# Patient Record
Sex: FEMALE | Race: WHITE | NOT HISPANIC OR LATINO | Employment: FULL TIME | URBAN - METROPOLITAN AREA
[De-identification: names, ages, dates, MRNs, and addresses within clinical notes are randomized per-mention and may not be internally consistent; named-entity substitution may affect disease eponyms.]

---

## 2024-08-23 RX ORDER — PREDNISONE 10 MG/1
10 TABLET ORAL 2 TIMES DAILY
COMMUNITY

## 2024-08-23 RX ORDER — ALBUTEROL SULFATE 90 UG/1
1 AEROSOL, METERED RESPIRATORY (INHALATION) EVERY 4 HOURS PRN
COMMUNITY

## 2024-08-23 RX ORDER — LORAZEPAM 1 MG/1
1 TABLET ORAL EVERY 8 HOURS PRN
COMMUNITY

## 2024-08-23 RX ORDER — GABAPENTIN 100 MG/1
100 CAPSULE ORAL
COMMUNITY

## 2024-08-23 RX ORDER — ESCITALOPRAM OXALATE 10 MG/1
10 TABLET ORAL DAILY
COMMUNITY

## 2024-08-23 RX ORDER — BENZONATATE 200 MG/1
100 CAPSULE ORAL 3 TIMES DAILY PRN
COMMUNITY

## 2024-08-26 ENCOUNTER — LAB (OUTPATIENT)
Dept: LAB | Facility: HOSPITAL | Age: 64
End: 2024-08-26
Payer: COMMERCIAL

## 2024-08-26 ENCOUNTER — HOSPITAL ENCOUNTER (OUTPATIENT)
Dept: CARDIOLOGY | Facility: HOSPITAL | Age: 64
Discharge: HOME | End: 2024-08-26
Payer: COMMERCIAL

## 2024-08-26 LAB
APTT PPP: 27.1 SECONDS (ref 24–31)
INR PPP: 0.93 (ref 0.93–1.1)
PROTHROMBIN TIME: 10.2 SECONDS (ref 9.6–11.7)

## 2024-08-26 PROCEDURE — 85730 THROMBOPLASTIN TIME PARTIAL: CPT | Performed by: INTERNAL MEDICINE

## 2024-08-26 PROCEDURE — 85610 PROTHROMBIN TIME: CPT | Performed by: INTERNAL MEDICINE

## 2024-08-26 PROCEDURE — 93010 ELECTROCARDIOGRAM REPORT: CPT | Performed by: INTERNAL MEDICINE

## 2024-08-26 PROCEDURE — 93005 ELECTROCARDIOGRAM TRACING: CPT

## 2024-08-26 PROCEDURE — 36415 COLL VENOUS BLD VENIPUNCTURE: CPT | Performed by: INTERNAL MEDICINE

## 2024-08-30 ENCOUNTER — ANESTHESIA (OUTPATIENT)
Dept: GASTROENTEROLOGY | Facility: HOSPITAL | Age: 64
End: 2024-08-30
Payer: COMMERCIAL

## 2024-08-30 ENCOUNTER — ANESTHESIA EVENT (OUTPATIENT)
Dept: GASTROENTEROLOGY | Facility: HOSPITAL | Age: 64
End: 2024-08-30
Payer: COMMERCIAL

## 2024-08-30 ENCOUNTER — HOSPITAL ENCOUNTER (OUTPATIENT)
Facility: HOSPITAL | Age: 64
Setting detail: HOSPITAL OUTPATIENT SURGERY
Discharge: HOME | End: 2024-08-30
Attending: INTERNAL MEDICINE | Admitting: INTERNAL MEDICINE
Payer: COMMERCIAL

## 2024-08-30 VITALS
RESPIRATION RATE: 12 BRPM | BODY MASS INDEX: 25.16 KG/M2 | HEIGHT: 68 IN | TEMPERATURE: 98.2 F | DIASTOLIC BLOOD PRESSURE: 41 MMHG | WEIGHT: 166 LBS | HEART RATE: 74 BPM | OXYGEN SATURATION: 95 % | SYSTOLIC BLOOD PRESSURE: 113 MMHG

## 2024-08-30 DIAGNOSIS — R91.8 HILAR MASS: ICD-10-CM

## 2024-08-30 DIAGNOSIS — C34.91 PRIMARY LUNG CANCER, RIGHT: Primary | ICD-10-CM

## 2024-08-30 LAB
B PARAPERT DNA SPEC QL NAA+PROBE: NOT DETECTED
B PERT DNA SPEC QL NAA+PROBE: NOT DETECTED
C PNEUM DNA NPH QL NAA+NON-PROBE: NOT DETECTED
DEPRECATED RDW RBC AUTO: 51.3 FL (ref 37–54)
ERYTHROCYTE [DISTWIDTH] IN BLOOD BY AUTOMATED COUNT: 17 % (ref 12.3–15.4)
FLUAV SUBTYP SPEC NAA+PROBE: NOT DETECTED
FLUBV RNA ISLT QL NAA+PROBE: NOT DETECTED
HADV DNA SPEC NAA+PROBE: NOT DETECTED
HCOV 229E RNA SPEC QL NAA+PROBE: NOT DETECTED
HCOV HKU1 RNA SPEC QL NAA+PROBE: NOT DETECTED
HCOV NL63 RNA SPEC QL NAA+PROBE: NOT DETECTED
HCOV OC43 RNA SPEC QL NAA+PROBE: NOT DETECTED
HCT VFR BLD AUTO: 40.7 % (ref 34–46.6)
HGB BLD-MCNC: 13.1 G/DL (ref 12–15.9)
HMPV RNA NPH QL NAA+NON-PROBE: NOT DETECTED
HPIV1 RNA ISLT QL NAA+PROBE: NOT DETECTED
HPIV2 RNA SPEC QL NAA+PROBE: NOT DETECTED
HPIV3 RNA NPH QL NAA+PROBE: NOT DETECTED
HPIV4 P GENE NPH QL NAA+PROBE: NOT DETECTED
M PNEUMO IGG SER IA-ACNC: NOT DETECTED
MCH RBC QN AUTO: 26.6 PG (ref 26.6–33)
MCHC RBC AUTO-ENTMCNC: 32.2 G/DL (ref 31.5–35.7)
MCV RBC AUTO: 82.6 FL (ref 79–97)
PLATELET # BLD AUTO: 322 10*3/MM3 (ref 140–450)
PMV BLD AUTO: 8.4 FL (ref 6–12)
QT INTERVAL: 404 MS
QTC INTERVAL: 430 MS
RBC # BLD AUTO: 4.93 10*6/MM3 (ref 3.77–5.28)
RHINOVIRUS RNA SPEC NAA+PROBE: NOT DETECTED
RSV RNA NPH QL NAA+NON-PROBE: NOT DETECTED
SARS-COV-2 RNA NPH QL NAA+NON-PROBE: NOT DETECTED
WBC NRBC COR # BLD AUTO: 11.53 10*3/MM3 (ref 3.4–10.8)

## 2024-08-30 PROCEDURE — 87206 SMEAR FLUORESCENT/ACID STAI: CPT | Performed by: INTERNAL MEDICINE

## 2024-08-30 PROCEDURE — 0202U NFCT DS 22 TRGT SARS-COV-2: CPT | Performed by: INTERNAL MEDICINE

## 2024-08-30 PROCEDURE — 88342 IMHCHEM/IMCYTCHM 1ST ANTB: CPT | Performed by: INTERNAL MEDICINE

## 2024-08-30 PROCEDURE — 88177 CYTP FNA EVAL EA ADDL: CPT | Performed by: INTERNAL MEDICINE

## 2024-08-30 PROCEDURE — 87205 SMEAR GRAM STAIN: CPT | Performed by: INTERNAL MEDICINE

## 2024-08-30 PROCEDURE — 25010000002 SUCCINYLCHOLINE PER 20 MG: Performed by: NURSE ANESTHETIST, CERTIFIED REGISTERED

## 2024-08-30 PROCEDURE — 25810000003 LACTATED RINGERS PER 1000 ML: Performed by: NURSE ANESTHETIST, CERTIFIED REGISTERED

## 2024-08-30 PROCEDURE — 88341 IMHCHEM/IMCYTCHM EA ADD ANTB: CPT | Performed by: INTERNAL MEDICINE

## 2024-08-30 PROCEDURE — 87116 MYCOBACTERIA CULTURE: CPT | Performed by: INTERNAL MEDICINE

## 2024-08-30 PROCEDURE — 88172 CYTP DX EVAL FNA 1ST EA SITE: CPT | Performed by: INTERNAL MEDICINE

## 2024-08-30 PROCEDURE — C1726 CATH, BAL DIL, NON-VASCULAR: HCPCS | Performed by: INTERNAL MEDICINE

## 2024-08-30 PROCEDURE — 87070 CULTURE OTHR SPECIMN AEROBIC: CPT | Performed by: INTERNAL MEDICINE

## 2024-08-30 PROCEDURE — 88173 CYTOPATH EVAL FNA REPORT: CPT | Performed by: INTERNAL MEDICINE

## 2024-08-30 PROCEDURE — 25810000003 SODIUM CHLORIDE 0.9 % SOLUTION: Performed by: INTERNAL MEDICINE

## 2024-08-30 PROCEDURE — 87798 DETECT AGENT NOS DNA AMP: CPT | Performed by: INTERNAL MEDICINE

## 2024-08-30 PROCEDURE — 85027 COMPLETE CBC AUTOMATED: CPT | Performed by: INTERNAL MEDICINE

## 2024-08-30 PROCEDURE — 87102 FUNGUS ISOLATION CULTURE: CPT | Performed by: INTERNAL MEDICINE

## 2024-08-30 PROCEDURE — 88305 TISSUE EXAM BY PATHOLOGIST: CPT | Performed by: INTERNAL MEDICINE

## 2024-08-30 PROCEDURE — 88108 CYTOPATH CONCENTRATE TECH: CPT | Performed by: INTERNAL MEDICINE

## 2024-08-30 PROCEDURE — 25010000002 SUGAMMADEX 200 MG/2ML SOLUTION: Performed by: NURSE ANESTHETIST, CERTIFIED REGISTERED

## 2024-08-30 RX ORDER — SODIUM CHLORIDE, SODIUM LACTATE, POTASSIUM CHLORIDE, CALCIUM CHLORIDE 600; 310; 30; 20 MG/100ML; MG/100ML; MG/100ML; MG/100ML
INJECTION, SOLUTION INTRAVENOUS CONTINUOUS PRN
Status: DISCONTINUED | OUTPATIENT
Start: 2024-08-30 | End: 2024-08-30 | Stop reason: SURG

## 2024-08-30 RX ORDER — PHENYLEPHRINE HCL IN 0.9% NACL 1 MG/10 ML
SYRINGE (ML) INTRAVENOUS AS NEEDED
Status: DISCONTINUED | OUTPATIENT
Start: 2024-08-30 | End: 2024-08-30 | Stop reason: SURG

## 2024-08-30 RX ORDER — LIDOCAINE HYDROCHLORIDE 20 MG/ML
INJECTION, SOLUTION INFILTRATION; PERINEURAL AS NEEDED
Status: DISCONTINUED | OUTPATIENT
Start: 2024-08-30 | End: 2024-08-30 | Stop reason: SURG

## 2024-08-30 RX ORDER — ROCURONIUM BROMIDE 10 MG/ML
INJECTION, SOLUTION INTRAVENOUS AS NEEDED
Status: DISCONTINUED | OUTPATIENT
Start: 2024-08-30 | End: 2024-08-30 | Stop reason: SURG

## 2024-08-30 RX ORDER — SUCCINYLCHOLINE CHLORIDE 20 MG/ML
INJECTION INTRAMUSCULAR; INTRAVENOUS AS NEEDED
Status: DISCONTINUED | OUTPATIENT
Start: 2024-08-30 | End: 2024-08-30 | Stop reason: SURG

## 2024-08-30 RX ORDER — SODIUM CHLORIDE 9 MG/ML
50 INJECTION, SOLUTION INTRAVENOUS CONTINUOUS
Status: DISCONTINUED | OUTPATIENT
Start: 2024-08-30 | End: 2024-08-30 | Stop reason: HOSPADM

## 2024-08-30 RX ORDER — METOPROLOL TARTRATE 1 MG/ML
INJECTION, SOLUTION INTRAVENOUS AS NEEDED
Status: DISCONTINUED | OUTPATIENT
Start: 2024-08-30 | End: 2024-08-30 | Stop reason: SURG

## 2024-08-30 RX ORDER — LIDOCAINE 50 MG/G
OINTMENT TOPICAL AS NEEDED
Status: DISCONTINUED | OUTPATIENT
Start: 2024-08-30 | End: 2024-08-30 | Stop reason: HOSPADM

## 2024-08-30 RX ADMIN — Medication 100 MCG: at 11:29

## 2024-08-30 RX ADMIN — ROCURONIUM BROMIDE 45 MG: 10 INJECTION, SOLUTION INTRAVENOUS at 11:16

## 2024-08-30 RX ADMIN — SUGAMMADEX 301 MG: 100 INJECTION, SOLUTION INTRAVENOUS at 11:43

## 2024-08-30 RX ADMIN — ROCURONIUM BROMIDE 5 MG: 10 INJECTION, SOLUTION INTRAVENOUS at 11:08

## 2024-08-30 RX ADMIN — LIDOCAINE HYDROCHLORIDE 100 MG: 20 INJECTION, SOLUTION INFILTRATION; PERINEURAL at 11:08

## 2024-08-30 RX ADMIN — SODIUM CHLORIDE, SODIUM LACTATE, POTASSIUM CHLORIDE, AND CALCIUM CHLORIDE: .6; .31; .03; .02 INJECTION, SOLUTION INTRAVENOUS at 11:04

## 2024-08-30 RX ADMIN — SUCCINYLCHOLINE CHLORIDE 160 MG: 20 INJECTION, SOLUTION INTRAMUSCULAR; INTRAVENOUS at 11:08

## 2024-08-30 RX ADMIN — METOPROLOL TARTRATE 2 MG: 1 INJECTION, SOLUTION INTRAVENOUS at 11:32

## 2024-08-30 RX ADMIN — SODIUM CHLORIDE 50 ML/HR: 9 INJECTION, SOLUTION INTRAVENOUS at 10:01

## 2024-08-30 NOTE — ANESTHESIA PROCEDURE NOTES
Airway  Urgency: elective    Date/Time: 8/30/2024 11:09 AM  Airway not difficult    General Information and Staff    Patient location during procedure: OR  Anesthesiologist: Denver Salomon MD  CRNA/CAA: Tanya Carias CRNA    Indications and Patient Condition  Indications for airway management: airway protection    Preoxygenated: yes  Mask difficulty assessment: 0 - not attempted    Final Airway Details  Final airway type: endotracheal airway      Successful airway: ETT     Successful intubation technique: video laryngoscopy and RSI  Facilitating devices/methods: intubating stylet  Endotracheal tube insertion site: oral  Blade: [place]  Blade size: 3  ETT size (mm): 8.5  Cormack-Lehane Classification: grade I - full view of glottis  Placement verified by: chest auscultation, bronchoscopy, capnometry and palpation of cuff   Measured from: lips  Number of attempts at approach: 1  Assessment: lips, teeth, and gum same as pre-op and atraumatic intubation

## 2024-08-30 NOTE — H&P
Patient Care Team:  Laura Archer as PCP - General (Nurse Practitioner)    Chief complaint right hilar mass        Assessment & Plan        64-year-old female with 50 pack year history of smoking CT chest at [facility] 2024 very large right hilar mass with almost complete obstruction right upper lobe, no hemoptysis no weight loss, family history positive lung cancer      Plan  EBUS bronchoscopy      History         64-year-old female with 50 pack year history of smoking CT chest at [facility] 2024 very large right hilar mass with almost complete obstruction right upper lobe, no hemoptysis no weight loss, family history positive lung cancer      * No active hospital problems. *      Past Medical History:   Diagnosis Date    Arthritis     Cancer     skin cancer on leg right    GERD (gastroesophageal reflux disease)        Past Surgical History:   Procedure Laterality Date     SECTION      x3    KNEE SURGERY Left     ORIF, screws and plates, has had Operation on it x 3       History reviewed. No pertinent family history.    Social History     Socioeconomic History    Marital status: Single   Tobacco Use    Smoking status: Every Day     Current packs/day: 0.50     Average packs/day: 0.5 packs/day for 47.7 years (23.8 ttl pk-yrs)     Types: Cigarettes     Start date:     Smokeless tobacco: Never   Vaping Use    Vaping status: Never Used   Substance and Sexual Activity    Alcohol use: Not Currently    Drug use: Never    Sexual activity: Defer       Review of Systems  Review of Systems   Respiratory:  Positive for cough and shortness of breath. Negative for wheezing and stridor.    Cardiovascular:  Negative for chest pain, palpitations and leg swelling.        Vital Signs       Physical Exam:  Physical Exam  Cardiovascular:      Heart sounds: No murmur heard.     No gallop.   Pulmonary:      Effort: No respiratory distress.      Breath sounds: No stridor. Rhonchi  "present. No wheezing or rales.   Chest:      Chest wall: No tenderness.           Radiology  Imaging Results (Last 24 Hours)       ** No results found for the last 24 hours. **            Labs:            Invalid input(s): \"LABALBU\", \"PROT\"                      Results from last 7 days   Lab Units 08/26/24  0945   INR  0.93   APTT seconds 27.1               Meds:   SCHEDULE    Infusions  No current facility-administered medications for this encounter.    PRNs        I discussed the patient's findings and my recommendations with patient.     Crystal Powell MD  08/30/24  09:31 EDT    Time: Critical care 60 min  "

## 2024-08-30 NOTE — DISCHARGE INSTRUCTIONS
Endoscopic Bronchial Ultrasound (EBUS)  Endoscopic Bronchial Ultrasound (EBUS) allows for sampling of lymph nodes in your chest during a bronchoscopy procedure.  Samples (biopsies) of the lymph nodes are taken from inside the lungs and sent for diagnostic testing.  Final results of your biopsy take up to 5 business days to process; your endoscopic physician will contact you with your results.  EBUS is recommended in the following instances:  To diagnose different types of lung disorders (such as sarcoidosis or tuberculosis)  To diagnose or 'stage' cancer   To investigate enlarged lymph  nodes in the chest  Due to effects of sedation, do not drive or operate heavy machinery for 24 hours.  Avoid heavy lifting (>10 lbs.) or strenuous activity for 48 hours.  Continue to avoid non-steroidal anti-inflammatory medications (NSAIDS) for 5-7 days after the procedure unless told otherwise by your endoscopic and primary care physicians.  Some patients have a temporary sore throat after the procedure; this is a normal finding and over-the-counter lozenges help soothe symptoms.  You may resume normal diet once you are discharged.   Avoid red-colored foods for 24 hours.   You may resume your blood thinner medications (if applicable) as directed by your endoscopic and primary care physicians.  It is normal to have a very small amount of blood-tinged sputum immediately after the procedure. This should resolve within a few hours.  Although complications are rare, there is a small risk of pain, collapsed lung, bleeding and infection. Contact your endoscopic physician if you have these signs or symptoms (Dr. Powell @ [phone]/ Dr. Wright @ [phone]):  Temperature greater than 102°F  Worsening pain not relieved by medications  Go to your nearest emergency room is you experience:  Shaking, chills or a temperature over 102°F.    New, sudden difficulty breathing.    New pain when taking a deep breath.    New, sudden chest  pain.  Worsening cough that produces large amounts of blood.

## 2024-08-30 NOTE — OP NOTE
Bronchoscopy Procedure Note    Timeout was done appropriately by staff    Procedure:  EBUS bronchoscopy directed FNA right hilar mass x 9 utilizing size 22-gauge needle  Right upper lobe washing    Preoperative Diagnosis: Right hilar mass    Postoperative Diagnosis:     Pathologist onsite confirmed malignant cells on FNA favoring non-small cell further confirmation pending staining    Anesthesia: General Anesthesia    Procedure Details: Patient was consented for the procedure with all risks and benefits of the procedure explained in detail.  Patient was given the opportunity to ask questions and all concerns were answered.  The bronchocope was inserted into the main airway via the endotracheal tube. An anatomical survey was done of the main airways and the subsegmental bronchus to at least the first subsegmental level of all five lobes of both lungs.  The findings are reported below.  A bronchoalveolar lavage was performed using aliquots of normal saline instilled into the airways then aspirated back.      Findings:      Started with routine bronchoscopy which was introduced through the endotracheal tube all airways were visualized to at least the first subsegment level of all 5 lobes of both lungs.  Airways were of normal size and caliber.  No endobronchial lesions seen.    Mucosa appeared normal without evidence of infiltration    We switched to EBUS bronchoscopy  We located the right hilar node 10R under direct ultrasound guidance. Three passes were done utilizing size 22 gauge needle  Additional FNA samples obtained from right hilar mass total of 6 passes  No bleeding was noted    Patient tolerated procedure well        Estimated Blood Loss:  Minimal           Specimens:  Sent serosanguinous fluid                Complications:  None; patient tolerated the procedure well.           Disposition: PACU - hemodynamically stable.      Patient tolerated the procedure well.    Crystal Powell MD  8/30/2024  12:15 EDT

## 2024-08-30 NOTE — ANESTHESIA POSTPROCEDURE EVALUATION
Patient: Laura Higginbotham    Procedure Summary       Date: 08/30/24 Room / Location: [facility] ENDOSCOPY 3 / [facility] ENDOSCOPY    Anesthesia Start: 1104 Anesthesia Stop: 1155    Procedure: BRONCHOSCOPY WITH BRONCHIAL WASHING ,ENDOBRONCHIAL ULTRASOUND WITH FINE NEEDLE ASPIRATION X2 AREAS (Bronchus) Diagnosis:       Hilar mass      (Hilar mass [R91.8])    Surgeons: Crystal Powell MD Provider: Denver Salomon MD    Anesthesia Type: general ASA Status: 3            Anesthesia Type: general    Vitals  Vitals Value Taken Time   /47 08/30/24 1207   Temp     Pulse 73 08/30/24 1208   Resp     SpO2 96 % 08/30/24 1208   Vitals shown include unfiled device data.        Post Anesthesia Care and Evaluation    Patient location during evaluation: PACU  Patient participation: complete - patient participated  Level of consciousness: awake  Pain scale: See nurse's notes for pain score.  Pain management: adequate    Airway patency: patent  Anesthetic complications: No anesthetic complications  PONV Status: none  Cardiovascular status: acceptable  Respiratory status: acceptable and spontaneous ventilation  Hydration status: acceptable    Comments: Patient seen and examined postoperatively; vital signs stable; SpO2 greater than or equal to 90%; cardiopulmonary status stable; nausea/vomiting adequately controlled; pain adequately controlled; no apparent anesthesia complications; patient discharged from anesthesia care when discharge criteria were met

## 2024-08-30 NOTE — ANESTHESIA PREPROCEDURE EVALUATION
Anesthesia Evaluation     NPO Solid Status: > 8 hours  NPO Liquid Status: > 8 hours           Airway   Mallampati: II  TM distance: >3 FB  Neck ROM: full  No difficulty expected  Dental - normal exam     Pulmonary - normal exam   Cardiovascular - normal exam        Neuro/Psych  GI/Hepatic/Renal/Endo    (+) GERD well controlled    Musculoskeletal     Abdominal  - normal exam    Bowel sounds: normal.   Substance History      OB/GYN          Other   arthritis,   history of cancer active                Anesthesia Plan    ASA 3     general     intravenous induction     Anesthetic plan, risks, benefits, and alternatives have been provided, discussed and informed consent has been obtained with: patient.  Pre-procedure education provided  Plan discussed with CRNA.    CODE STATUS:

## 2024-08-31 LAB — NIGHT BLUE STAIN TISS: NORMAL

## 2024-09-01 LAB
BACTERIA SPEC RESP CULT: NORMAL
GRAM STN SPEC: NORMAL
GRAM STN SPEC: NORMAL

## 2024-09-04 LAB
DX PRELIMINARY: NORMAL
LAB AP CASE REPORT: NORMAL
P JIROVECII DNA L RESP QL NAA+NON-PROBE: NEGATIVE
PATH REPORT.FINAL DX SPEC: NORMAL
PATH REPORT.GROSS SPEC: NORMAL
REF LAB TEST METHOD: NORMAL

## 2024-09-05 LAB
BEAKER LAB AP INTRAOPERATIVE CONSULTATION: NORMAL
LAB AP CASE REPORT: NORMAL
LAB AP DIAGNOSIS COMMENT: NORMAL
PATH REPORT.FINAL DX SPEC: NORMAL
PATH REPORT.GROSS SPEC: NORMAL

## 2024-09-06 ENCOUNTER — TELEPHONE (OUTPATIENT)
Dept: ONCOLOGY | Facility: CLINIC | Age: 64
End: 2024-09-06
Payer: COMMERCIAL

## 2024-09-06 LAB
FUNGUS WND CULT: NORMAL
MYCOBACTERIUM SPEC CULT: NORMAL
NIGHT BLUE STAIN TISS: NORMAL

## 2024-09-10 LAB
BEAKER LAB AP INTRAOPERATIVE CONSULTATION: NORMAL
LAB AP CASE REPORT: NORMAL
LAB AP DIAGNOSIS COMMENT: NORMAL
PATH REPORT.ADDENDUM SPEC: NORMAL
PATH REPORT.FINAL DX SPEC: NORMAL
PATH REPORT.GROSS SPEC: NORMAL

## 2024-09-11 ENCOUNTER — PATIENT OUTREACH (OUTPATIENT)
Dept: ONCOLOGY | Facility: CLINIC | Age: 64
End: 2024-09-11
Payer: COMMERCIAL

## 2024-09-11 PROBLEM — C34.11 MALIGNANT NEOPLASM OF UPPER LOBE OF RIGHT LUNG: Status: ACTIVE | Noted: 2024-09-11

## 2024-09-11 NOTE — PROGRESS NOTES
HEMATOLOGY ONCOLOGY OUTPATIENT CONSULTATION       Patient name: Laura Higginbotham  : 1960  MRN: [id number]  Primary Care Physician: Laura Archer  Referring Physician: Crystal Powell MD  Reason For Consult: Primary lung cancer, right      History of Present Illness:  Patient is a 64 y.o. PMH of smoking, skin cancer, arthritis, GERD who presents for newly diagnosed lung cancer.    -2024 Pt went to Dr Archer for increased nonproductive coughing and feeling more dysnpeic on exertion. Was dx with pleurisy and given prednisone which helped somewhat.  -2024 Since cough hadn't completely gone away in 6 weeks, patient went back to Dr. Archer who became concerned and ordered a CXR and antibiotics.  -CXR showed a right hilar mass, CT was ordered.    -2024 CT chest at [facility]: Revealed a very large right hilar mass measuring 9.2 x 9.5 cm in diameter with hypodensity centrally suggesting a necrotic center.  There is obstruction of the right upper lobe bronchus with the infiltrate seen extending peripheral to that area and marked elevation of the right hilum.  Mass extends into the right hilar lymph nodes.    Patient was seen in consultation by Dr. Crystal Powell, pulmonology. She denied having a cough, hemoptysis, wheezing, dyspnea, fever, unintentional weight loss.    -2020 for EBUS bronchoscopy by Dr. Crystal Powell with FNA to the right hilar node 10R and to the right hilar mass revealing inés revealing malignant cells favoring non-small cell.    Pathology showed in the right hilar mass IHC positive for CK7, TTF-1, and Napsin, negative for p63 and CK/6 consistent with pulmonary adenocarcinoma negative for CD56, chromogranin, synaptophysin excluding neuroendocrine component.  Per discussion with pathology: Passes from the right hilar mass were found to be acellular, viable tissue was from the lymph node biopsied and 1 cassette is available for future testing.  -Pneumocystis was  negative,  respiratory culture showing rare growth of normal respiratory alexi with no S. aureus or Pseudomonas aeruginosa detected.,  Respiratory panel PCR negative, PRELIM: no acid-fast bacilli seen on concentrated smear at 1 week, No isolated fungus at 1 week,.  -2024 CBC showed WBC 11.53, hemoglobin 13.1, hematocrit 40.7 with essentially normal indices, platelets 332K    Subjective:  -2024 Patient presents for initial consultation with her niece reporting constant nonproductive cough, she still denies having hemoptysis, wheezing, dyspnea, fever, or unintentional weight loss, new bony pains, no new H/A or focal neuro deficit except right under arm new tingling/burning.  She has a history of smoking 1 PPD- started at 17, this summer cut down to 1/2 PPD mostly because of the coughing (47+ years).    Past Medical History:   Diagnosis Date    Arthritis     Cancer     skin cancer on leg right    GERD (gastroesophageal reflux disease)        Past Surgical History:   Procedure Laterality Date    BRONCHOSCOPY N/A 2024    Procedure: BRONCHOSCOPY WITH BRONCHIAL WASHING ,ENDOBRONCHIAL ULTRASOUND WITH FINE NEEDLE ASPIRATION X2 AREAS;  Surgeon: Crystal Powell MD;  Location: [facility] ENDOSCOPY;  Service: Pulmonary;  Laterality: N/A;     SECTION      x3    KNEE SURGERY Left     ORIF, screws and plates, has had Operation on it x 3       Current Outpatient Medications:     albuterol sulfate  (90 Base) MCG/ACT inhaler, Inhale 1 puff Every 4 (Four) Hours As Needed for Wheezing., Disp: , Rfl:     escitalopram (LEXAPRO) 10 MG tablet, Take 1 tablet by mouth Daily., Disp: , Rfl:     gabapentin (NEURONTIN) 100 MG capsule, Take 1 capsule by mouth 2 (Two) Times a Day., Disp: , Rfl:     LORazepam (ATIVAN) 1 MG tablet, Take 1 tablet by mouth Every 8 (Eight) Hours As Needed for Anxiety., Disp: , Rfl:     predniSONE (DELTASONE) 10 MG tablet, Take 1 tablet by mouth 2 (Two) Times a Day. X 30 days, Disp: , Rfl:      benzonatate (TESSALON) 200 MG capsule, Take 100 mg by mouth 3 (Three) Times a Day As Needed for Cough. (Patient not taking: Reported on 9/12/2024), Disp: , Rfl:     nicotine polacrilex (GoodSense Nicotine) 4 MG gum, Chew 1 each As Needed for Smoking Cessation., Disp: 120 each, Rfl: 4    No Known Allergies    Family History   Problem Relation Age of Onset    Lung cancer Brother         Lung cancer/Leukemia    Cancer Brother        Cancer-related family history includes Cancer in her brother; Lung cancer in her brother.      Social History     Tobacco Use    Smoking status: Every Day     Current packs/day: 0.50     Average packs/day: 0.5 packs/day for 47.7 years (23.8 ttl pk-yrs)     Types: Cigarettes     Start date: 1977    Smokeless tobacco: Never   Vaping Use    Vaping status: Never Used   Substance Use Topics    Alcohol use: Not Currently    Drug use: Never     Social History     Social History Narrative    Not on file       Review of Systems:  Constitutional: Denies any weakness, fatigue, lack of appetite, excessive appetite, weight change, chills or fever.  Eyes: Reports no blurry vision, no double vision, no pain in the eyes, dry eyes or tearing.  ENT: Reports no decreased hearing capacity, ear pain or tinnitus. Denies any epistaxis, nasal congestion, mouth sores or bleeding, tooth or jaw pain, sore throat or hoarseness.  Respiratory: + chronic cough, Denies any sputum production or hemoptysis. There is no wheezing, shortness of breath or any other respiratory complaints.  Cardiovascular: + shortness of breath with activity, Denies any chest pain, shortness of breath when lying down, palpitations, or leg swelling.  Breasts: Denies any lumps, bumps, pain, nipple changes or discharge in the breasts.  Gastrointestinal: There are no complaints of abdominal pain, difficulty swallowing or painful swallowing, anorexia, nausea, vomiting, diarrhea, constipation, heartburn, blood in the stools, dark stools, or change in  "bowel habits or hemorrhoids.  Genitourinary: Denies any pain or burning on urination, blood in the urine or frequent urination.   Musculoskeletal: Denies painful joints, no swelling of the joints, muscle or back pain. Denies any pain or tingling in the legs, hands or feet.  Neuropsychiatric: Denies any nervousness, depressed mood, headaches, blackouts, dizziness, weakness of limbs, loss of sensation, loss of balance, loss of coordination or difficulty in speaking.  Cutaneous: Denies any dry skin, itching, rash, change in the color of the skin, or any other cutaneous complaints.  Hematologic/Lymphatic: Denies any bruising or bleeding. Report no recent development of palpable or painful lymph nodes.  Allergy/Immunology: Denies rash/hayfever symptoms or any recent history of pneumonia, recurrent sinusitis, urinary infection or any other history of an ongoing infection.  Endocrine: Reports no intolerance to heat or cold, excessive thirst or excessive urination.      Objective:    Vital Signs:  Vitals:    24 1050   BP: 139/86   Pulse: 100   Resp: 18   Temp: 98.2 °F (36.8 °C)   SpO2: 98%   Weight: 76.5 kg (168 lb 9.6 oz)   Height: 172.7 cm (68\")   PainSc: 0-No pain     Body mass index is 25.64 kg/m².      Physical Exam:   ECO  GENERAL: The patient is a pleasant middle aged white female who appears their stated age and is awake, in no acute distress, alert and oriented x3.  SKIN: No rash or ulcers.  HEME/LYMPHATICS: No bruising or petechiae on visual inspection. No lymphadenopathy on visual inspection and palpation of cervical, supraclavicular, infraclavicular lymph nodes.  HEAD/FACE: atraumatic and normocephalic. Normal hair.  EYES: PERRLA/EOMI. No scleral icterus. No tearing or dry eyes.  ENT: External ears normal with no evidence of discharge. No evidence of ulceration or bleeding in the nostrils. Mouth has no ulcers, bleeding or inflammation of the gums, floor or roof of the mouth with normal " dentition.  NECK: Supple, symmetric.   CHEST/RESPIRATORY: Expansion maintained bilaterally and symmetrically. On auscultation, clear breath sounds in left lung, decreased breath sounds in upper right lung field. No wheezes, rhonchi or rales.  BREAST: Deferred.  CARDIOVASCULAR: On auscultation, regular rate and rhythm. No murmurs, gallops or rubs are heard.  GASTROINTESTINAL/ABDOMEN: Symmetric. On auscultation of the abdomen, there are normal bowel sounds in all four quadrants. There are no surgical scars in the abdomen. Nontender to palpation.  GENITOURINARY: Deferred.  NEUROLOGIC: Alert and oriented time, person, and place, with no apparent changes in recent or remote memory. Cranial nerves II-XII are grossly intact. Sensation is maintained in the extremities. Strength and tone appear normal for age and equal in the extremities. Gait is normal.  MUSCULOSKELETAL: No cyanosis, clubbing or edema in the extremities. There are no surgical scars on the extremities.  PSYCHIATRIC: The patient maintains judgment and has good insight. The patient has no changes in mood or affection.    Lab Results - Last 18 Months   Lab Units 09/12/24  1151 08/30/24  0950   WBC 10*3/mm3 8.26 11.53*   HEMOGLOBIN g/dL 12.1 13.1   HEMATOCRIT % 38.3 40.7   PLATELETS 10*3/mm3 425 322   MCV fL 84.5 82.6     Lab Results - Last 18 Months   Lab Units 09/12/24  1151   SODIUM mmol/L 134*   POTASSIUM mmol/L 4.3   CHLORIDE mmol/L 98   CO2 mmol/L 25.5   BUN mg/dL 8   CREATININE mg/dL 0.73   CALCIUM mg/dL 9.6   BILIRUBIN mg/dL 0.2   ALK PHOS U/L 104   ALT (SGPT) U/L 12   AST (SGOT) U/L 12   GLUCOSE mg/dL 251*       Lab Results   Component Value Date    GLUCOSE 251 (H) 09/12/2024    BUN 8 09/12/2024    CREATININE 0.73 09/12/2024    BCR 11.0 09/12/2024    K 4.3 09/12/2024    CO2 25.5 09/12/2024    CALCIUM 9.6 09/12/2024    ALBUMIN 3.9 09/12/2024    AST 12 09/12/2024    ALT 12 09/12/2024       Lab Results - Last 18 Months   Lab Units 08/26/24  0945   INR   "0.93   APTT seconds 27.1       No results found for: \"IRON\", \"TIBC\", \"FERRITIN\"    No results found for: \"FOLATE\"    No results found for: \"OCCULTBLD\"    No results found for: \"RETICCTPCT\"  No results found for: \"EHNLANPF46\"  No results found for: \"SPEP\", \"UPEP\"  No results found for: \"LDH\", \"URICACID\"  No results found for: \"BEBETO\", \"RF\", \"SEDRATE\"  No results found for: \"FIBRINOGEN\", \"HAPTOGLOBIN\"  Lab Results   Component Value Date    PTT 27.1 08/26/2024    INR 0.93 08/26/2024     No results found for: \"\"  No results found for: \"CEA\"  No components found for: \"CA-19-9\"  No results found for: \"PSA\"  No results found for: \"SEDRATE\"       Assessment & Plan     right hilar mass/right upper lobe adenocarcinoma, at minimum T4 N1 (IIIA) however requires further w/u.    Discussed the above results which include imaging and pathology with the patient.  - To complete staging, ordered PET/CT and Brain MRI  May require pathologic mediastinal lymph node evaluation (mediastinoscopy, mediastinotomy, EBUS, EUS), or MRI with contrast of spine and thoracic inlet for superior sulcus lesions abutting the spine, subclavian vessels, or brachial plexus.    -Radiology Oncology consult     Discussed with patient that she is stage III, we need to rule out IV, as treatment options are different.  -Discussed the utility in biomarker testing (including EGFR mutation including exon 19 del, L858R and other deletions, exon 20 insertions, (category 1), ALK fusions (category 1), PD-L1 testing (category 1), KRAS G12C, ROS1 fusions, BRAF V600E, NTRK 1/2/3 fusions, MET exon 14 skipping or amplifications, RET fusions, ErbB2 (HER2) alterations, testing to be conducted as part of broad molecular profiling., pt agreed so testing was ordered.    -After PET/CT, will need to evaluate if the patient would potentially be an operative candidate, which would be less likely given the size of the mass, or if she is a stage IV. IF she would be a surgical " candidate, she would likely be a candidate for perioperative therapy (Patient's should be evaluated for preoperative therapy with strong consideration for an immune checkpoint inhibitor plus chemo for patients whose tumors are greater than equal to 4 cm were noted positives with no contraindications to immune checkpoint inhibitors)  -ordered as above for PD-L1, EGFR mutations, ALK rearrangements  - PFTs already ordered and scheduled by her pulmonologist per patient. *    -Discussed briefly treatment options could potentially be chemotherapy and/or immunotherapy versus targeted therapy.  -Discussion about Mediport placement briefly and more discussion for after scans.  -*Data has shown a Palliative consult to help manage symptoms early on in care for advanced stage lung cancer patients.    RTC after imaging to review results and staging.    Thank you very much for providing the opportunity to participate in this patient’s care. Please do not hesitate to call if there are any other questions.

## 2024-09-11 NOTE — PROGRESS NOTES
HEMATOLOGY ONCOLOGY OUTPATIENT CONSULTATION       Patient name: Laura Higginbotham  : 1960  MRN: [id number]  Primary Care Physician: Laura Archer  Referring Physician: Crystal Powell MD  Reason For Consult: right lung cancer      History of Present Illness:  Patient is a 64 y.o. female with PMH of skin cancer, arthritis, GERD who presents for newly diagnosed lung cancer      Subjective:  -atient presents for initial consultation       Past Medical History:   Diagnosis Date    Arthritis     Cancer     skin cancer on leg right    GERD (gastroesophageal reflux disease)        Past Surgical History:   Procedure Laterality Date    BRONCHOSCOPY N/A 2024    Procedure: BRONCHOSCOPY WITH BRONCHIAL WASHING ,ENDOBRONCHIAL ULTRASOUND WITH FINE NEEDLE ASPIRATION X2 AREAS;  Surgeon: Crystal Powell MD;  Location: [facility] ENDOSCOPY;  Service: Pulmonary;  Laterality: N/A;     SECTION      x3    KNEE SURGERY Left     ORIF, screws and plates, has had Operation on it x 3         Current Outpatient Medications:     albuterol sulfate  (90 Base) MCG/ACT inhaler, Inhale 1 puff Every 4 (Four) Hours As Needed for Wheezing., Disp: , Rfl:     benzonatate (TESSALON) 200 MG capsule, Take 100 mg by mouth 3 (Three) Times a Day As Needed for Cough., Disp: , Rfl:     escitalopram (LEXAPRO) 10 MG tablet, Take 1 tablet by mouth Daily., Disp: , Rfl:     gabapentin (NEURONTIN) 100 MG capsule, Take 1 capsule by mouth 3 (Three) Times a Day As Needed (pain)., Disp: , Rfl:     LORazepam (ATIVAN) 1 MG tablet, Take 1 tablet by mouth Every 8 (Eight) Hours As Needed for Anxiety., Disp: , Rfl:     predniSONE (DELTASONE) 10 MG tablet, Take 1 tablet by mouth 2 (Two) Times a Day. X 30 days, Disp: , Rfl:     No Known Allergies    No family history on file.    Cancer-related family history is not on file.      Social History     Tobacco Use    Smoking status: Every Day     Current packs/day: 0.50     Average  "packs/day: 0.5 packs/day for 47.7 years (23.8 ttl pk-yrs)     Types: Cigarettes     Start date: 1977    Smokeless tobacco: Never   Vaping Use    Vaping status: Never Used   Substance Use Topics    Alcohol use: Not Currently    Drug use: Never     Social History     Social History Narrative    Not on file       ROS:   Review of Systems      Objective:    Vital Signs:  There were no vitals filed for this visit.  There is no height or weight on file to calculate BMI.    ECOG  {[facility] ECOG Status:81080}    Physical Exam:   Physical Exam    Lab Results - Last 18 Months   Lab Units 08/30/24  0950   WBC 10*3/mm3 11.53*   HEMOGLOBIN g/dL 13.1   HEMATOCRIT % 40.7   PLATELETS 10*3/mm3 322   MCV fL 82.6     No results for input(s): \"NA\", \"K\", \"CL\", \"CO2\", \"BUN\", \"CREATININE\", \"CALCIUM\", \"BILITOT\", \"ALKPHOS\", \"ALT\", \"AST\", \"GLUCOSE\" in the last 33256 hours.    Invalid input(s): \"LABALBU\", \"PROT\"    No results found for: \"GLUCOSE\", \"BUN\", \"CREATININE\", \"EGFRIFNONA\", \"EGFRIFAFRI\", \"BCR\", \"K\", \"CO2\", \"CALCIUM\", \"PROTENTOTREF\", \"ALBUMIN\", \"LABIL2\", \"BILIRUBIN\", \"AST\", \"ALT\"    Lab Results - Last 18 Months   Lab Units 08/26/24  0945   INR  0.93   APTT seconds 27.1       No results found for: \"IRON\", \"TIBC\", \"FERRITIN\"    No results found for: \"FOLATE\"    No results found for: \"OCCULTBLD\"    No results found for: \"RETICCTPCT\"  No results found for: \"WHMZXYUV46\"  No results found for: \"SPEP\", \"UPEP\"  No results found for: \"LDH\", \"URICACID\"  No results found for: \"BEBETO\", \"RF\", \"SEDRATE\"  No results found for: \"FIBRINOGEN\", \"HAPTOGLOBIN\"  Lab Results   Component Value Date    PTT 27.1 08/26/2024    INR 0.93 08/26/2024     No results found for: \"\"  No results found for: \"CEA\"  No components found for: \"CA-19-9\"  No results found for: \"PSA\"  No results found for: \"SEDRATE\"       Assessment & Plan           Thank you very much for providing the opportunity to participate in this patient’s care. Please do not hesitate to " call if there are any other questions.

## 2024-09-12 ENCOUNTER — CONSULT (OUTPATIENT)
Dept: ONCOLOGY | Facility: CLINIC | Age: 64
End: 2024-09-12
Payer: COMMERCIAL

## 2024-09-12 ENCOUNTER — APPOINTMENT (OUTPATIENT)
Dept: LAB | Facility: HOSPITAL | Age: 64
End: 2024-09-12
Payer: COMMERCIAL

## 2024-09-12 VITALS
HEART RATE: 100 BPM | TEMPERATURE: 98.2 F | HEIGHT: 68 IN | BODY MASS INDEX: 25.55 KG/M2 | SYSTOLIC BLOOD PRESSURE: 139 MMHG | OXYGEN SATURATION: 98 % | WEIGHT: 168.6 LBS | DIASTOLIC BLOOD PRESSURE: 86 MMHG | RESPIRATION RATE: 18 BRPM

## 2024-09-12 DIAGNOSIS — C34.11 MALIGNANT NEOPLASM OF UPPER LOBE OF RIGHT LUNG: Primary | ICD-10-CM

## 2024-09-12 LAB
ALBUMIN SERPL-MCNC: 3.9 G/DL (ref 3.5–5.2)
ALBUMIN/GLOB SERPL: 1.1 G/DL
ALP SERPL-CCNC: 104 U/L (ref 39–117)
ALT SERPL W P-5'-P-CCNC: 12 U/L (ref 1–33)
ANION GAP SERPL CALCULATED.3IONS-SCNC: 10.5 MMOL/L (ref 5–15)
AST SERPL-CCNC: 12 U/L (ref 1–32)
BASOPHILS # BLD AUTO: 0.04 10*3/MM3 (ref 0–0.2)
BASOPHILS NFR BLD AUTO: 0.5 % (ref 0–1.5)
BILIRUB SERPL-MCNC: 0.2 MG/DL (ref 0–1.2)
BUN SERPL-MCNC: 8 MG/DL (ref 8–23)
BUN/CREAT SERPL: 11 (ref 7–25)
CALCIUM SPEC-SCNC: 9.6 MG/DL (ref 8.6–10.5)
CHLORIDE SERPL-SCNC: 98 MMOL/L (ref 98–107)
CO2 SERPL-SCNC: 25.5 MMOL/L (ref 22–29)
CREAT SERPL-MCNC: 0.73 MG/DL (ref 0.57–1)
DEPRECATED RDW RBC AUTO: 50.1 FL (ref 37–54)
EGFRCR SERPLBLD CKD-EPI 2021: 92 ML/MIN/1.73
EOSINOPHIL # BLD AUTO: 0.05 10*3/MM3 (ref 0–0.4)
EOSINOPHIL NFR BLD AUTO: 0.6 % (ref 0.3–6.2)
ERYTHROCYTE [DISTWIDTH] IN BLOOD BY AUTOMATED COUNT: 16.3 % (ref 12.3–15.4)
GLOBULIN UR ELPH-MCNC: 3.4 GM/DL
GLUCOSE SERPL-MCNC: 251 MG/DL (ref 65–99)
HCT VFR BLD AUTO: 38.3 % (ref 34–46.6)
HGB BLD-MCNC: 12.1 G/DL (ref 12–15.9)
LYMPHOCYTES # BLD AUTO: 1.95 10*3/MM3 (ref 0.7–3.1)
LYMPHOCYTES NFR BLD AUTO: 23.6 % (ref 19.6–45.3)
MCH RBC QN AUTO: 26.7 PG (ref 26.6–33)
MCHC RBC AUTO-ENTMCNC: 31.6 G/DL (ref 31.5–35.7)
MCV RBC AUTO: 84.5 FL (ref 79–97)
MONOCYTES # BLD AUTO: 0.47 10*3/MM3 (ref 0.1–0.9)
MONOCYTES NFR BLD AUTO: 5.7 % (ref 5–12)
NEUTROPHILS NFR BLD AUTO: 5.75 10*3/MM3 (ref 1.7–7)
NEUTROPHILS NFR BLD AUTO: 69.6 % (ref 42.7–76)
PLATELET # BLD AUTO: 425 10*3/MM3 (ref 140–450)
PMV BLD AUTO: 8 FL (ref 6–12)
POTASSIUM SERPL-SCNC: 4.3 MMOL/L (ref 3.5–5.2)
PROT SERPL-MCNC: 7.3 G/DL (ref 6–8.5)
RBC # BLD AUTO: 4.53 10*6/MM3 (ref 3.77–5.28)
SODIUM SERPL-SCNC: 134 MMOL/L (ref 136–145)
WBC NRBC COR # BLD AUTO: 8.26 10*3/MM3 (ref 3.4–10.8)

## 2024-09-12 PROCEDURE — 80053 COMPREHEN METABOLIC PANEL: CPT | Performed by: INTERNAL MEDICINE

## 2024-09-12 PROCEDURE — 85025 COMPLETE CBC W/AUTO DIFF WBC: CPT | Performed by: INTERNAL MEDICINE

## 2024-09-12 PROCEDURE — 36415 COLL VENOUS BLD VENIPUNCTURE: CPT | Performed by: INTERNAL MEDICINE

## 2024-09-12 NOTE — PATIENT INSTRUCTIONS
Labs today  Pet scan and MRI in the next week or so    Try the nicotine gum as discussed    We'll see each other after imaging to discuss plan

## 2024-09-13 ENCOUNTER — PATIENT ROUNDING (BHMG ONLY) (OUTPATIENT)
Dept: ONCOLOGY | Facility: CLINIC | Age: 64
End: 2024-09-13
Payer: COMMERCIAL

## 2024-09-13 ENCOUNTER — PATIENT OUTREACH (OUTPATIENT)
Dept: ONCOLOGY | Facility: CLINIC | Age: 64
End: 2024-09-13
Payer: COMMERCIAL

## 2024-09-13 ENCOUNTER — DOCUMENTATION (OUTPATIENT)
Dept: ONCOLOGY | Facility: CLINIC | Age: 64
End: 2024-09-13
Payer: COMMERCIAL

## 2024-09-13 LAB
FUNGUS WND CULT: NORMAL
MYCOBACTERIUM SPEC CULT: NORMAL
NIGHT BLUE STAIN TISS: NORMAL

## 2024-09-13 NOTE — PROGRESS NOTES
OSW was informed that pt may have transportation needs.  OSW attempted to get information from pt insurance but was unable.  OSW called pt and explained to her that we needed to know if she had any non-emergency transportation benefits on her health insurance.  Pt called her insurance company, then called OSW back and stated that she was told she only has ambulance transportation.

## 2024-09-13 NOTE — SIGNIFICANT NOTE
Called to introduce myself to patient. Gave patient my direct number. Adjusted scans to be sooner and also scheduled her Dr. Dasilva appt for 9/27. Discussed all dates, times, locations and instructions with patient. All questions answered.

## 2024-09-13 NOTE — PROGRESS NOTES
September 13, 2024    Hello, may I speak with Laura Higginbotham?    My name is Jacqueline Elizondo      I am  with [facility]  [address] [place] [address] [phone].    Before we get started may I verify your date of birth? 1960    I am calling to officially welcome you to our practice and ask about your recent visit. Is this a good time to talk? no    Tell me about your visit with us. What things went well?  A My Chart message was sent to the patient.         We're always looking for ways to make our patients' experiences even better. Do you have recommendations on ways we may improve?  no    Overall were you satisfied with your first visit to our practice? yes       I appreciate you taking the time to speak with me today. Is there anything else I can do for you? no      Thank you, and have a great day.

## 2024-09-18 LAB
BEAKER LAB AP INTRAOPERATIVE CONSULTATION: NORMAL
LAB AP CASE REPORT: NORMAL
LAB AP DIAGNOSIS COMMENT: NORMAL
LAB AP FLOW CYTOMETRY REPORT,ADDENDUM: NORMAL
PATH REPORT.ADDENDUM SPEC: NORMAL
PATH REPORT.FINAL DX SPEC: NORMAL
PATH REPORT.GROSS SPEC: NORMAL

## 2024-09-20 ENCOUNTER — HOSPITAL ENCOUNTER (OUTPATIENT)
Dept: MRI IMAGING | Facility: HOSPITAL | Age: 64
Discharge: HOME | End: 2024-09-20
Payer: COMMERCIAL

## 2024-09-20 ENCOUNTER — HOSPITAL ENCOUNTER (OUTPATIENT)
Dept: PET IMAGING | Facility: HOSPITAL | Age: 64
Discharge: HOME | End: 2024-09-20
Payer: COMMERCIAL

## 2024-09-20 ENCOUNTER — TELEPHONE (OUTPATIENT)
Dept: ONCOLOGY | Facility: CLINIC | Age: 64
End: 2024-09-20

## 2024-09-20 LAB
FUNGUS WND CULT: NORMAL
GLUCOSE BLDC GLUCOMTR-MCNC: 215 MG/DL (ref 70–105)
MYCOBACTERIUM SPEC CULT: NORMAL
NIGHT BLUE STAIN TISS: NORMAL

## 2024-09-20 PROCEDURE — A9579 GAD-BASE MR CONTRAST NOS,1ML: HCPCS | Performed by: INTERNAL MEDICINE

## 2024-09-20 PROCEDURE — 82948 REAGENT STRIP/BLOOD GLUCOSE: CPT

## 2024-09-20 PROCEDURE — 70553 MRI BRAIN STEM W/O & W/DYE: CPT

## 2024-09-20 PROCEDURE — 25010000002 GADOTERIDOL PER 1 ML: Performed by: INTERNAL MEDICINE

## 2024-09-20 RX ADMIN — GADOTERIDOL 15 ML: 279.3 INJECTION, SOLUTION INTRAVENOUS at 11:59

## 2024-09-20 NOTE — TELEPHONE ENCOUNTER
Caller: Laura Higginbotham    Relationship: Self    Best call back number: [phone]      What was the call regarding: PATIENT WAS NOT ABLE TO GET HER PET SCAN TODAY DUE TO HER HIGH BLOOD SUGAR AND PATENT WANTED TO KNOW WHAT TO DO

## 2024-09-23 NOTE — TELEPHONE ENCOUNTER
Attempted to call pt back and let her know that she will need to call to reschedule her PET scan and make sure that she is fasting prior to the scan.  Pt will also need to follow up with her PCP regarding elevated blood sugars.  Voicemail left for pt to return my call.  I left my direct number for pt to call back.

## 2024-09-23 NOTE — TELEPHONE ENCOUNTER
Spoke w/ pt and she states that she called her PCP last week because her blood sugars have been elevated.  She states that she was started on Metformin.  Spoke w/ Kelly downstairs in [place] and she rescheduled her for Wednesday 9/25/24 at 11:00 am.  Pt notified and v/u.

## 2024-09-25 ENCOUNTER — HOSPITAL ENCOUNTER (OUTPATIENT)
Dept: PET IMAGING | Facility: HOSPITAL | Age: 64
Discharge: HOME | End: 2024-09-25
Payer: COMMERCIAL

## 2024-09-25 PROBLEM — C77.1 NON-SMALL CELL LUNG CANCER METASTATIC TO INTRATHORACIC LYMPH NODE: Status: ACTIVE | Noted: 2024-09-25

## 2024-09-25 PROBLEM — C34.90 NON-SMALL CELL LUNG CANCER METASTATIC TO INTRATHORACIC LYMPH NODE: Status: ACTIVE | Noted: 2024-09-25

## 2024-09-25 PROBLEM — F32.1 MODERATE MAJOR DEPRESSION, SINGLE EPISODE: Status: ACTIVE | Noted: 2024-09-25

## 2024-09-25 PROBLEM — G62.9 POLYNEUROPATHY: Status: ACTIVE | Noted: 2024-09-25

## 2024-09-25 PROBLEM — F06.4 ORGANIC ANXIETY DISORDER: Status: ACTIVE | Noted: 2024-09-25

## 2024-09-25 LAB — GLUCOSE BLDC GLUCOMTR-MCNC: 181 MG/DL (ref 70–105)

## 2024-09-25 PROCEDURE — 82948 REAGENT STRIP/BLOOD GLUCOSE: CPT

## 2024-09-25 PROCEDURE — 78815 PET IMAGE W/CT SKULL-THIGH: CPT

## 2024-09-25 PROCEDURE — A9552 F18 FDG: HCPCS | Performed by: INTERNAL MEDICINE

## 2024-09-25 PROCEDURE — 0 FLUDEOXYGLUCOSE F18 SOLUTION: Performed by: INTERNAL MEDICINE

## 2024-09-25 RX ORDER — BUDESONIDE, GLYCOPYRROLATE, AND FORMOTEROL FUMARATE 160; 9; 4.8 UG/1; UG/1; UG/1
2 AEROSOL, METERED RESPIRATORY (INHALATION) 2 TIMES DAILY
COMMUNITY
Start: 2024-09-23

## 2024-09-25 RX ORDER — METFORMIN HYDROCHLORIDE 750 MG/1
750 TABLET, EXTENDED RELEASE ORAL
COMMUNITY
Start: 2024-09-20

## 2024-09-25 RX ADMIN — FLUDEOXYGLUCOSE F 18 1 DOSE: 200 INJECTION, SOLUTION INTRAVENOUS at 11:10

## 2024-09-26 LAB — FUNGUS WND CULT: NORMAL

## 2024-09-27 ENCOUNTER — CONSULT (OUTPATIENT)
Dept: RADIATION ONCOLOGY | Facility: HOSPITAL | Age: 64
End: 2024-09-27
Payer: COMMERCIAL

## 2024-09-27 VITALS
DIASTOLIC BLOOD PRESSURE: 73 MMHG | OXYGEN SATURATION: 99 % | WEIGHT: 168 LBS | BODY MASS INDEX: 25.54 KG/M2 | HEART RATE: 87 BPM | SYSTOLIC BLOOD PRESSURE: 127 MMHG | RESPIRATION RATE: 20 BRPM

## 2024-09-27 DIAGNOSIS — C34.90 NON-SMALL CELL LUNG CANCER METASTATIC TO INTRATHORACIC LYMPH NODE: ICD-10-CM

## 2024-09-27 DIAGNOSIS — C77.1 NON-SMALL CELL LUNG CANCER METASTATIC TO INTRATHORACIC LYMPH NODE: ICD-10-CM

## 2024-09-27 DIAGNOSIS — C34.11 PRIMARY ADENOCARCINOMA OF UPPER LOBE OF RIGHT LUNG: Primary | ICD-10-CM

## 2024-09-27 LAB
MYCOBACTERIUM SPEC CULT: NORMAL
NIGHT BLUE STAIN TISS: NORMAL

## 2024-09-27 PROCEDURE — G0463 HOSPITAL OUTPT CLINIC VISIT: HCPCS | Performed by: INTERNAL MEDICINE

## 2024-09-27 RX ORDER — OXYCODONE HCL 5 MG/5 ML
5 SOLUTION, ORAL ORAL EVERY 4 HOURS PRN
Qty: 200 ML | Refills: 0 | Status: SHIPPED | OUTPATIENT
Start: 2024-09-27

## 2024-10-01 ENCOUNTER — HOSPITAL ENCOUNTER (OUTPATIENT)
Dept: RADIATION ONCOLOGY | Facility: HOSPITAL | Age: 64
Setting detail: RADIATION/ONCOLOGY SERIES
End: 2024-10-01
Payer: COMMERCIAL

## 2024-10-01 ENCOUNTER — HOSPITAL ENCOUNTER (OUTPATIENT)
Dept: RADIATION ONCOLOGY | Facility: HOSPITAL | Age: 64
Discharge: HOME | End: 2024-10-01

## 2024-10-01 PROBLEM — R11.0 CHEMOTHERAPY-INDUCED NAUSEA: Status: ACTIVE | Noted: 2024-10-01

## 2024-10-01 PROBLEM — T45.1X5A CHEMOTHERAPY-INDUCED NAUSEA: Status: ACTIVE | Noted: 2024-10-01

## 2024-10-01 PROCEDURE — 77470 SPECIAL RADIATION TREATMENT: CPT | Performed by: INTERNAL MEDICINE

## 2024-10-01 PROCEDURE — 77334 RADIATION TREATMENT AID(S): CPT | Performed by: INTERNAL MEDICINE

## 2024-10-01 PROCEDURE — 77263 THER RADIOLOGY TX PLNG CPLX: CPT | Performed by: INTERNAL MEDICINE

## 2024-10-01 RX ORDER — PROCHLORPERAZINE MALEATE 10 MG
10 TABLET ORAL EVERY 6 HOURS PRN
Qty: 45 TABLET | Refills: 3 | Status: CANCELLED | OUTPATIENT
Start: 2024-10-01

## 2024-10-01 NOTE — PROGRESS NOTES
HEMATOLOGY ONCOLOGY OUTPATIENT FOLLOW UP       Patient name: Laura Higginbotham  : 1960  MRN: [id number]  Primary Care Physician: Laura Archer  Referring Physician: Laura Archer  Reason For Consult: Primary lung cancer, right    History of Present Illness:  Patient is a 64 y.o. PMH of smoking, skin cancer, arthritis, GERD who presents for newly diagnosed lung cancer.    -2024 Pt went to Dr Archer for increased nonproductive coughing and feeling more dysnpeic on exertion. Was dx with pleurisy and given prednisone which helped somewhat.  -2024 Since cough hadn't completely gone away in 6 weeks, patient went back to Dr. Archer who became concerned and ordered a CXR and antibiotics.  -CXR showed a right hilar mass, CT was ordered.    -2024 CT chest at [facility]: Revealed a very large right hilar mass measuring 9.2 x 9.5 cm in diameter with hypodensity centrally suggesting a necrotic center.  There is obstruction of the right upper lobe bronchus with the infiltrate seen extending peripheral to that area and marked elevation of the right hilum.  Mass extends into the right hilar lymph nodes.    Patient was seen in consultation by Dr. Crystal Powell, pulmonology. She denied having a cough, hemoptysis, wheezing, dyspnea, fever, unintentional weight loss.    -2020 for EBUS bronchoscopy by Dr. Crystal Powell with FNA to the right hilar node 10R and to the right hilar mass revealing inés revealing malignant cells favoring non-small cell.    Pathology showed in the right hilar mass IHC positive for CK7, TTF-1, and Napsin, negative for p63 and CK/6 consistent with pulmonary adenocarcinoma negative for CD56, chromogranin, synaptophysin excluding neuroendocrine component.  Per discussion with pathology: Passes from the right hilar mass were found to be acellular, viable tissue was from the lymph node biopsied and 1 cassette is available for future testing.  -Pneumocystis was negative,  respiratory culture  showing rare growth of normal respiratory alexi with no S. aureus or Pseudomonas aeruginosa detected.,  Respiratory panel PCR negative, PRELIM: no acid-fast bacilli seen on concentrated smear at 1 week, No isolated fungus at 1 week,.  -8/30/2024 CBC showed WBC 11.53, hemoglobin 13.1, hematocrit 40.7 with essentially normal indices, platelets 332K    -9/12/2024 Patient presented for initial consultation with her niece reporting constant nonproductive cough, she still denies having hemoptysis, wheezing, dyspnea, fever, or unintentional weight loss, new bony pains, no new H/A or focal neuro deficit except right under arm new tingling/burning.  She has a history of smoking 1 PPD- started at 17, this summer cut down to 1/2 PPD mostly because of the coughing (47+ years).    Subjective:  -9/20/2024 MRI brain with without contrast with no acute intracranial process identified, findings suggestive of minimal chronic small vessel ischemic disease but no evidence of intracranial metastasis    -9/23/2025 Caris shows PD-L1 positive with TPS 80% by PDL1 test 22C3, with level 1 evidence for cemiplimab and pembrolizumab, positive by PD-L1 28-8 with level 1 evidence for nivolumab/ipilimumab combination, positive by PD-L1  with TC 1+, 50% with atezolizumab in the metastatic setting level 1 evidence, and PD-L1 positive and  positive TC 1+60% with a benefit of atezolizumab in the adjuvant setting, and cemiplimab level 1 evidence.  -ALK negative/fusion not detected by RNA in the tumor, BRAF mutation not detected, EGFR mutation not detected, MET variant transcript not detected, RET fusion not detected, ROS1 fusion not detected.  Tumor mutation burden was high at 18, microsatellite stable.  KRAS pG12V found and 61%, TP53 rS514J found in 56%, NFKBIA was amplified    -9/25/2024 NM PET/CT skull initial staging: Heterogeneous right upper lobe lung mass extending into the superior right lower lobe, encasing the right mainstem  bronchus, encasing and narrowing the right upper lobe bronchus, is hypermetabolic (mSUV 8.76), consistent with malignancy. It has heterogeneous areas of photopenia, suggesting internal necrosis. Pre-carinal lymph node measuring 14 mm short axis is FDG avid (mSUV 4.0), consistent with malignancy.  No hybrid avidity in the neck, abdomen, pelvis, or skeleton. No left hilar adenopathy. No suspicious left lung nodules. Normal heart size with coronary calcifications. Benign calcified mediastinal and left hilar lymph nodes are present.    -10/2/2024 Patient presents in follow up with her niece reporting better nonproductive cough and rib pain since being oxycodone low dose, she still denies having hemoptysis, wheezing, dyspnea, fever, or unintentional weight loss, new bony pains, no new H/A or focal neuro deficit except right under arm new tingling/burning. Got SIMMED yesterday for radiation.  She has a history of smoking 1 PPD- started at 17, still on  PPD - gum stuck to dentures.    Past Medical History:   Diagnosis Date    Arthritis     Cancer     skin cancer on leg right    GERD (gastroesophageal reflux disease)        Past Surgical History:   Procedure Laterality Date    BRONCHOSCOPY N/A 2024    Procedure: BRONCHOSCOPY WITH BRONCHIAL WASHING ,ENDOBRONCHIAL ULTRASOUND WITH FINE NEEDLE ASPIRATION X2 AREAS;  Surgeon: Crystal Powell MD;  Location: [facility] ENDOSCOPY;  Service: Pulmonary;  Laterality: N/A;     SECTION      x3    KNEE SURGERY Left     ORIF, screws and plates, has had Operation on it x 3         Current Outpatient Medications:     albuterol sulfate  (90 Base) MCG/ACT inhaler, Inhale 1 puff Every 4 (Four) Hours As Needed for Wheezing., Disp: , Rfl:     metFORMIN ER (GLUCOPHAGE-XR) 750 MG 24 hr tablet, Take 1 tablet by mouth Daily With Dinner., Disp: , Rfl:     oxyCODONE (ROXICODONE) 5 MG/5ML solution, Take 5 mL by mouth Every 4 (Four) Hours As Needed for Moderate Pain., Disp: 200 mL, Rfl:  0    predniSONE (DELTASONE) 10 MG tablet, Take 1 tablet by mouth 2 (Two) Times a Day. X 30 days, Disp: , Rfl:     benzonatate (TESSALON) 200 MG capsule, Take 100 mg by mouth 3 (Three) Times a Day As Needed for Cough. (Patient not taking: Reported on 9/12/2024), Disp: , Rfl:     Breztri Aerosphere 160-9-4.8 MCG/ACT aerosol inhaler, Inhale 2 puffs 2 (Two) Times a Day. (Patient not taking: Reported on 10/2/2024), Disp: , Rfl:     dexAMETHasone (DECADRON) 4 MG tablet, Take 1 tablet twice a day for 3 consecutive days beginning day before chemo, Disp: 24 tablet, Rfl: 2    escitalopram (LEXAPRO) 10 MG tablet, Take 1 tablet by mouth Daily. (Patient not taking: Reported on 10/2/2024), Disp: , Rfl:     famotidine (PEPCID) 20 MG tablet, Take 1 tablet by mouth 2 (Two) Times a Day., Disp: 30 tablet, Rfl: 2    folic acid (Folate) 400 MCG tablet, Take 1 tablet by mouth Daily for 270 days., Disp: 90 tablet, Rfl: 2    gabapentin (NEURONTIN) 100 MG capsule, Take 1 capsule by mouth 2 (Two) Times a Day. (Patient not taking: Reported on 10/2/2024), Disp: , Rfl:     lidocaine-prilocaine (EMLA) 2.5-2.5 % cream, Apply 1 Application topically to the appropriate area as directed Every 2 (Two) Hours As Needed for Mild Pain. To port site before chemo/blood draw, Disp: 30 g, Rfl: 2    LORazepam (ATIVAN) 1 MG tablet, Take 1 tablet by mouth Every 8 (Eight) Hours As Needed for Anxiety. (Patient not taking: Reported on 10/2/2024), Disp: , Rfl:     nicotine polacrilex (GoodSense Nicotine) 4 MG gum, Chew 1 each As Needed for Smoking Cessation. (Patient not taking: Reported on 10/2/2024), Disp: 120 each, Rfl: 4    OLANZapine (zyPREXA) 5 MG tablet, Take one tablet every night for four nights starting the night of chemotherapy, Disp: 12 tablet, Rfl: 1    ondansetron ODT (ZOFRAN-ODT) 8 MG disintegrating tablet, Take 1 tablet by mouth Every 8 (Eight) Hours As Needed for Nausea or Vomiting., Disp: 45 tablet, Rfl: 3    No Known Allergies    Family History    Problem Relation Age of Onset    Lung cancer Brother         Lung cancer/Leukemia    Cancer Brother        Cancer-related family history includes Cancer in her brother; Lung cancer in her brother.    Social History     Tobacco Use    Smoking status: Every Day     Current packs/day: 0.50     Average packs/day: 0.5 packs/day for 47.8 years (23.9 ttl pk-yrs)     Types: Cigarettes     Start date: 1977    Smokeless tobacco: Never   Vaping Use    Vaping status: Never Used   Substance Use Topics    Alcohol use: Not Currently    Drug use: Never     Social History     Social History Narrative    Not on file      Review of Systems:  Constitutional: Denies any weakness, fatigue, lack of appetite, excessive appetite, weight change, chills or fever.  Eyes: Reports no blurry vision, no double vision, no pain in the eyes, dry eyes or tearing.  ENT: Reports no decreased hearing capacity, ear pain or tinnitus. Denies any epistaxis, nasal congestion, mouth sores or bleeding, tooth or jaw pain, sore throat or hoarseness.  Respiratory: + chronic cough (improved) Denies any sputum production or hemoptysis. There is no wheezing, shortness of breath or any other respiratory complaints.  Cardiovascular: + shortness of breath with activity, Denies any chest pain, shortness of breath when lying down, palpitations, or leg swelling.  Breasts: Denies any lumps, bumps, pain, nipple changes or discharge in the breasts.  Gastrointestinal: There are no complaints of abdominal pain, difficulty swallowing or painful swallowing, anorexia, nausea, vomiting, diarrhea, constipation, heartburn, blood in the stools, dark stools, or change in bowel habits or hemorrhoids.  Genitourinary: Denies any pain or burning on urination, blood in the urine or frequent urination.   Musculoskeletal: Denies painful joints, no swelling of the joints, muscle or back pain. Denies any pain or tingling in the legs, hands or feet.  Neuropsychiatric: Denies any nervousness,  "depressed mood, headaches, blackouts, dizziness, weakness of limbs, loss of sensation, loss of balance, loss of coordination or difficulty in speaking.  Cutaneous: Denies any dry skin, itching, rash, change in the color of the skin, or any other cutaneous complaints.  Hematologic/Lymphatic: Denies any bruising or bleeding. Report no recent development of palpable or painful lymph nodes.  Allergy/Immunology: Denies rash/hayfever symptoms or any recent history of pneumonia, recurrent sinusitis, urinary infection or any other history of an ongoing infection.  Endocrine: Reports no intolerance to heat or cold, excessive thirst or excessive urination.    Objective:  Vital signs:  Vitals:    10/02/24 0914   BP: 127/76   Pulse: 84   Temp: 97.5 °F (36.4 °C)   TempSrc: Oral   SpO2: 99%   Weight: 78.3 kg (172 lb 9.6 oz)   Height: 172.7 cm (67.99\")   PainSc: 0-No pain     Body mass index is 26.25 kg/m².      Physical Exam:   ECO  GENERAL: The patient is a pleasant middle aged white female who appears their stated age and is awake, in no acute distress, alert and oriented x3.  SKIN: No rash or ulcers.  HEME/LYMPHATICS: No bruising or petechiae on visual inspection. No lymphadenopathy on visual inspection and palpation of cervical, supraclavicular, infraclavicular lymph nodes.  HEAD/FACE: atraumatic and normocephalic. Normal hair.  EYES: PERRLA/EOMI. No scleral icterus. No tearing or dry eyes.  ENT: External ears normal with no evidence of discharge. No evidence of ulceration or bleeding in the nostrils. Mouth has no ulcers, bleeding or inflammation of the gums, floor or roof of the mouth with normal dentition.  NECK: Supple, symmetric.   CHEST/RESPIRATORY: Expansion maintained bilaterally and symmetrically. On auscultation, clear breath sounds in left lung, decreased breath sounds in upper right lung field. No wheezes, rhonchi or rales.  BREAST: Deferred.  CARDIOVASCULAR: On auscultation, regular rate and rhythm. No " murmurs, gallops or rubs are heard.  GASTROINTESTINAL/ABDOMEN: Symmetric. On auscultation of the abdomen, there are normal bowel sounds in all four quadrants. There are no surgical scars in the abdomen. Nontender to palpation.  GENITOURINARY: Deferred.  NEUROLOGIC: Alert and oriented time, person, and place, with no apparent changes in recent or remote memory. Cranial nerves II-XII are grossly intact. Sensation is maintained in the extremities. Strength and tone appear normal for age and equal in the extremities. Gait is normal.  MUSCULOSKELETAL: No cyanosis, clubbing or edema in the extremities. There are no surgical scars on the extremities.  PSYCHIATRIC: The patient maintains judgment and has good insight. The patient has no changes in mood or affection.    Lab Results - Last 18 Months   Lab Units 09/12/24  1151 08/30/24  0950   WBC 10*3/mm3 8.26 11.53*   HEMOGLOBIN g/dL 12.1 13.1   HEMATOCRIT % 38.3 40.7   PLATELETS 10*3/mm3 425 322   MCV fL 84.5 82.6     Lab Results - Last 18 Months   Lab Units 09/12/24  1151   SODIUM mmol/L 134*   POTASSIUM mmol/L 4.3   CHLORIDE mmol/L 98   CO2 mmol/L 25.5   BUN mg/dL 8   CREATININE mg/dL 0.73   CALCIUM mg/dL 9.6   BILIRUBIN mg/dL 0.2   ALK PHOS U/L 104   ALT (SGPT) U/L 12   AST (SGOT) U/L 12   GLUCOSE mg/dL 251*       Lab Results   Component Value Date    GLUCOSE 251 (H) 09/12/2024    BUN 8 09/12/2024    CREATININE 0.73 09/12/2024    BCR 11.0 09/12/2024    K 4.3 09/12/2024    CO2 25.5 09/12/2024    CALCIUM 9.6 09/12/2024    ALBUMIN 3.9 09/12/2024    AST 12 09/12/2024    ALT 12 09/12/2024       Lab Results - Last 18 Months   Lab Units 08/26/24  0945   INR  0.93   APTT seconds 27.1     Lab Results   Component Value Date    PTT 27.1 08/26/2024    INR 0.93 08/26/2024       Assessment & Plan     Right hilar mass/right upper lobe adenocarcinoma, at minimum T4 N2 (IIIB)     Discussed the above results which include imaging and pathology with the patient.  -Staging PET/CT showed no  metastatic disease but confirmed IIIB disease, nonsurgical candidate-discussed this in detail with the patient and her family and explained that the standard of care at this point would be chemoradiation with curative intent.  Discussed the couple chemotherapy regimens that can be used for radiation.  -Staging brain MRI negative for metastasis    -Radiology Oncology consult-on board for concurrent chemoradiation   -PFTs already ordered and scheduled by her pulmonologist per patient.    -Biomarker testing +PD-L1 testing (category 1), +KRAS G12C, negative EGFR mutation including exon 19 del, L858R and other deletions, exon 20 insertions, (category 1), ALK fusions (category 1), ROS1 fusions, BRAF V600E, NTRK 1/2/3 fusions, MET exon 14 skipping or amplifications, RET fusions, ErbB2 (HER2) alterations,    PLAN: cisplatin plus pemetrexed Q21 days X 3 cycles with concurrent radiation with repeat imaging approx 30 days after, followed by durvalumab 1500 mg Q4 weeks x 12 cycles (category 1 for stage III)     - ordered B12 shot  (will then be next due C3),   -patient requires chemoteach (can get first B12 injection at that time)ordered  -patient requires Mediport placement (ordered) with EMLA cream available-ordered   -Will do CBC, CMP while on treatment    ordered chemotherapy and chemoteach and have patient RTC 10/14 with labs for C1 on first day of radiation    -ordered folic acid 1 mg daily and informed pt to start taking immediately  -Ordered Zyprexa, dexamethasone per protocol, and as needed Zofran     -each cycle will have toxicity check with labs CBC, CMP      -*Data has shown a Palliative consult to help manage symptoms early on in care for advanced stage lung cancer patients.          Thank you very much for providing the opportunity to participate in this patient’s care. Please do not hesitate to call if there are any other questions.

## 2024-10-02 ENCOUNTER — OFFICE VISIT (OUTPATIENT)
Dept: ONCOLOGY | Facility: CLINIC | Age: 64
End: 2024-10-02
Payer: COMMERCIAL

## 2024-10-02 VITALS
TEMPERATURE: 97.5 F | HEIGHT: 68 IN | SYSTOLIC BLOOD PRESSURE: 127 MMHG | WEIGHT: 172.6 LBS | OXYGEN SATURATION: 99 % | DIASTOLIC BLOOD PRESSURE: 76 MMHG | HEART RATE: 84 BPM | BODY MASS INDEX: 26.16 KG/M2

## 2024-10-02 DIAGNOSIS — C77.1 NON-SMALL CELL LUNG CANCER METASTATIC TO INTRATHORACIC LYMPH NODE: ICD-10-CM

## 2024-10-02 DIAGNOSIS — R11.0 CHEMOTHERAPY-INDUCED NAUSEA: ICD-10-CM

## 2024-10-02 DIAGNOSIS — C34.11 MALIGNANT NEOPLASM OF UPPER LOBE OF RIGHT LUNG: Primary | ICD-10-CM

## 2024-10-02 DIAGNOSIS — T45.1X5A CHEMOTHERAPY-INDUCED NAUSEA: ICD-10-CM

## 2024-10-02 DIAGNOSIS — C34.90 NON-SMALL CELL LUNG CANCER METASTATIC TO INTRATHORACIC LYMPH NODE: ICD-10-CM

## 2024-10-02 RX ORDER — ONDANSETRON 8 MG/1
8 TABLET, ORALLY DISINTEGRATING ORAL EVERY 8 HOURS PRN
Qty: 45 TABLET | Refills: 3 | Status: SHIPPED | OUTPATIENT
Start: 2024-10-02

## 2024-10-02 RX ORDER — CYANOCOBALAMIN 1000 UG/ML
1000 INJECTION, SOLUTION INTRAMUSCULAR; SUBCUTANEOUS ONCE
Status: CANCELLED | OUTPATIENT
Start: 2024-10-07 | End: 2024-10-07

## 2024-10-02 RX ORDER — LANOLIN ALCOHOL/MO/W.PET/CERES
400 CREAM (GRAM) TOPICAL DAILY
Qty: 90 TABLET | Refills: 2 | Status: SHIPPED | OUTPATIENT
Start: 2024-10-02 | End: 2025-06-29

## 2024-10-02 RX ORDER — LIDOCAINE/PRILOCAINE 2.5 %-2.5%
1 CREAM (GRAM) TOPICAL
Qty: 30 G | Refills: 2 | Status: SHIPPED | OUTPATIENT
Start: 2024-10-02

## 2024-10-02 RX ORDER — OLANZAPINE 5 MG/1
TABLET ORAL
Qty: 12 TABLET | Refills: 1 | Status: SHIPPED | OUTPATIENT
Start: 2024-10-02

## 2024-10-02 RX ORDER — FAMOTIDINE 20 MG/1
20 TABLET, FILM COATED ORAL 2 TIMES DAILY
Qty: 30 TABLET | Refills: 2 | Status: SHIPPED | OUTPATIENT
Start: 2024-10-02

## 2024-10-02 RX ORDER — DEXAMETHASONE 4 MG/1
TABLET ORAL
Qty: 24 TABLET | Refills: 2 | Status: SHIPPED | OUTPATIENT
Start: 2024-10-02

## 2024-10-02 NOTE — PATIENT INSTRUCTIONS
Sent a bunch of meds to pharmacy- go ahead and pick them up and start the folic acid    Need to get port soon- they should be scheduling in the next few days    Need to get chemoteach and B12 shot a week before anticipated chemo date of 10/14

## 2024-10-03 ENCOUNTER — HOSPITAL ENCOUNTER (OUTPATIENT)
Dept: ONCOLOGY | Facility: HOSPITAL | Age: 64
Discharge: HOME | End: 2024-10-03
Payer: COMMERCIAL

## 2024-10-03 ENCOUNTER — CLINICAL SUPPORT (OUTPATIENT)
Dept: ONCOLOGY | Facility: CLINIC | Age: 64
End: 2024-10-03
Payer: COMMERCIAL

## 2024-10-03 ENCOUNTER — OFFICE VISIT (OUTPATIENT)
Dept: ONCOLOGY | Facility: CLINIC | Age: 64
End: 2024-10-03
Payer: COMMERCIAL

## 2024-10-03 ENCOUNTER — APPOINTMENT (OUTPATIENT)
Dept: LAB | Facility: HOSPITAL | Age: 64
End: 2024-10-03
Payer: COMMERCIAL

## 2024-10-03 VITALS
TEMPERATURE: 98.2 F | DIASTOLIC BLOOD PRESSURE: 84 MMHG | HEIGHT: 68 IN | WEIGHT: 173 LBS | SYSTOLIC BLOOD PRESSURE: 128 MMHG | HEART RATE: 83 BPM | BODY MASS INDEX: 26.22 KG/M2 | OXYGEN SATURATION: 100 %

## 2024-10-03 DIAGNOSIS — C34.90 NON-SMALL CELL LUNG CANCER METASTATIC TO INTRATHORACIC LYMPH NODE: ICD-10-CM

## 2024-10-03 DIAGNOSIS — C34.11 MALIGNANT NEOPLASM OF UPPER LOBE OF RIGHT LUNG: ICD-10-CM

## 2024-10-03 DIAGNOSIS — C34.11 MALIGNANT NEOPLASM OF UPPER LOBE OF RIGHT LUNG: Primary | ICD-10-CM

## 2024-10-03 DIAGNOSIS — C77.1 NON-SMALL CELL LUNG CANCER METASTATIC TO INTRATHORACIC LYMPH NODE: ICD-10-CM

## 2024-10-03 DIAGNOSIS — Z71.9 ENCOUNTER FOR EDUCATION: Primary | ICD-10-CM

## 2024-10-03 PROCEDURE — 25010000002 CYANOCOBALAMIN PER 1000 MCG: Performed by: INTERNAL MEDICINE

## 2024-10-03 PROCEDURE — 96372 THER/PROPH/DIAG INJ SC/IM: CPT

## 2024-10-03 RX ORDER — CYANOCOBALAMIN 1000 UG/ML
1000 INJECTION, SOLUTION INTRAMUSCULAR; SUBCUTANEOUS
Status: DISCONTINUED | OUTPATIENT
Start: 2024-10-03 | End: 2024-10-04 | Stop reason: HOSPADM

## 2024-10-03 RX ADMIN — CYANOCOBALAMIN 1000 MCG: 1000 INJECTION, SOLUTION INTRAMUSCULAR at 12:01

## 2024-10-03 NOTE — PROGRESS NOTES
Met with pt as part of the treatment preparation process.  Introductions made and contact information provided.  Pt accompanied by her dtr Aisha who will be her main support person.  Pt lives with her significant other currently.  If she needs more care, her plan is to move in with Aisha.  Pt has other children and friends in her support system who will assist with transportation and other needs as they arise.  Pt does not have any advance directives in place at this time.  However, she was interested in reviewing LW and HR.  OSW provided copies for pt and dtr to review.  They will let OSW know if they have questions or need assistance completing the forms.  Pt worked as a  for [employer] and has been on [employer] which runs out on 10/31.  Pt has applied for Social Security Disability.  Pt states that her basic needs are met, no food insecurity, housing, or other deficits noted.  Reviewed potential resources if needed in the future.  Pt and dtr encouraged to reach out if needs arise and both v/u.

## 2024-10-03 NOTE — PROGRESS NOTES
Pt to injection chair for B12. Pt tolerated injection well. Pt aware of next appt and discharged home.

## 2024-10-03 NOTE — PROGRESS NOTES
TREATMENT  PREPARATION    Laura Higginbotham  [id number]  1960    Chief Complaint: Treatment preparation and needs assessment    History of present illness:  Laura Higginbotham is a 64 y.o. year old female who is here today for treatment preparation and needs assessment.  The patient has been diagnosed with   Encounter Diagnoses   Name Primary?    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     and is scheduled to begin treatment with:     Oncology History:    Oncology/Hematology History   Malignant neoplasm of upper lobe of right lung   9/11/2024 Initial Diagnosis    Primary adenocarcinoma of upper lobe of right lung     9/25/2024 Cancer Staged    Staging form: Lung, AJCC 8th Edition  - Clinical stage from 9/25/2024: Stage IIIB (cT4, cN2, cM0) - Signed by Ivan Dasilva MD on 9/29/2024     10/14/2024 -  Chemotherapy    OP LUNG PEMEtrexed / CISplatin + XRT     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   9/25/2024 Initial Diagnosis    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     10/14/2024 -  Chemotherapy    OP LUNG PEMEtrexed / CISplatin + XRT         The current medication list and allergy list were reviewed and reconciled.     Past Medical History, Past Surgical History, Social History, Family History have been reviewed and are without significant changes except as mentioned.    Physical Exam:    Vitals:    10/03/24 1006   BP: 128/84   Pulse: 83   Temp: 98.2 °F (36.8 °C)   SpO2: 100%     Vitals:    10/03/24 1006   PainSc: 0-No pain        ECOG score: 1             Physical Exam  Vitals reviewed.   Constitutional:       Appearance: Normal appearance. She is not toxic-appearing.   HENT:      Head: Normocephalic.      Mouth/Throat:      Mouth: Mucous membranes are moist.   Eyes:      Pupils: Pupils are equal, round, and reactive to light.   Pulmonary:      Effort: Pulmonary  effort is normal.   Musculoskeletal:         General: No swelling.   Skin:     Coloration: Skin is not jaundiced.      Findings: No bruising, lesion or rash.   Neurological:      Mental Status: She is oriented to person, place, and time.   Psychiatric:         Mood and Affect: Mood normal.         Behavior: Behavior normal.         Thought Content: Thought content normal.         Judgment: Judgment normal.           NEEDS ASSESSMENTS    Genetics  The patient's new diagnosis and family history have been reviewed for genetic counseling needs. The patient will not be referred..     Psychosocial and Barriers to care  The patient has completed a PHQ-9 Depression Screening and the Distress Thermometer (DT) today.  PHQ-9 results show PHQ-2 Total Score: 0 PHQ-9 Total Score: PHQ-9 Total Score: 0     The patient scored their distress today as Distress Level: 5 on a scale of 0-10 with 0 being no distress and 10 being extreme distress. Problems marked by the patient as being an issue for them within the last week include concern with treatment, diagnosis.     Results were reviewed along with psychosocial resources offered by our cancer center.  Our Supportive Oncology team will be flagged for a score of 4 or above, and a same day call will be made for a score of 9 or 10.  A mental health referral is offered at that time. Patients who score less than 4 have been educated on our support services and can be referred to our  upon request.  The patient will be referred to our .       Nutrition  The patient has completed the malnutrition screening today. They scored Malnutrition Screening Tool  Have you recently lost weight without trying?  If yes, how much weight have you lost?: 0--> No  Have you been eating poorly because of a decreased appetite?: 0--> No  MST score: 0   with a score of 0-1 meaning not at risk in a score of 2 or greater meaning at risk.  Patients with a score of 3 or higher will be  referred to our oncology dietitian for support. Patients beginning at risk treatment regimens or who have dietary concerns will also be referred to our oncology dietitian. The patient will be referred.    Functional Assessment  Persons who are age 70 or greater will be screened for qualification of a comprehensive geriatric assessment by our survivorship nurse practitioner.  Older adults with cancer face unique challenges. These may include an increased risk of drug reactions, financial burdens, and caregiver stress.  NOT APPLICABLE    Intravenous Access Assessment  The patient and I discussed planned intravenous chemo/biotherapy as well as other IV treatments that are often needed throughout the course of treatment. These may include, but are not limited to blood transfusions, antibiotics, and IV hydration. Discussed that depending on selected treatment and vein assessment, patient may require venous access device (VAD) which could include but not limited to a Mediport or PICC line. Risks and benefits of VADs reviewed. The patient will be treated via Port.    Reproductive/Sexual Activity   People should avoid becoming pregnant and should not get a partner pregnant while undergoing chemo/biotherapy.  People of childbearing age should use effective contraception during active therapy. The best recommendation for all people is to use a barrier method for a minimum of 1 week after the last infusion of chemo/biotherapy to prevent your partner being exposed to byproducts from treatment medications in bodily fluids. Effective contraception should be discussed with your oncology team to make sure it is safe to take based on your diagnosis. Possible options include oral contraceptives, barrier methods. Chemo/biotherapy can change your ability to reproduce children in the future.  There are options for fertility preservation. NOT APPLICABLE    Advanced Care Planning  Advance Care Planning   The patient and I discussed  "advanced care planning, \"Conversations that Matter\".   This service is offered for development of advance directives with a certified ACP facilitator.  The patient does not have an up-to-date advanced directive. This document is not on file with our office. The patient is not interested in an appointment with one of our facilitators to create or update their advanced directives.               Smoking cessation  Tobacco Use: High Risk (10/3/2024)    Patient History     Smoking Tobacco Use: Every Day     Smokeless Tobacco Use: Never     Passive Exposure: Not on file       Patient and I discussed their tobacco use history. Referral will not be made for smoking cessation.  Provided educational materials. She does not want to continue with gum.    Palliative Care  When appropriate, the patient and I discussed the availability palliative care services and when appropriate Hospice care. Palliative care is not the same as Hospice care which was explained to the patient.The patient is not interested in additional information from our  on these services.    Survivorship   When appropriate, we discussed that we will refer the patient to survivorship clinic to discuss next steps following completion of planned treatment.  Reviewed this visit will include assessment of your physical, psychological, functional, and spiritual needs as a survivor and the need at attend this visit when scheduled.    TREATMENT EDUCATION    Today I met with the patient to discuss the chemo/biotherapy regimen recommended for treatment of Encounter for education    Malignant neoplasm of upper lobe of right lung    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  .  The patient was given explanation of treatment premed side effects including office policy that prohibits patients to drive if sedating medications are administered, MD explanation given regarding benefits, side effects, toxicities and goals of treatment.  The " patient received a Chemotherapy/Biotherapy Plan Summary including diagnosis and explanation of specific treatment plan.    SIDE EFFECTS:  Common side effects were discussed with the patient and/or significant other.  Discussion included where applicable hair loss/discoloration, anemia/fatigue, infection/chills/fever, appetite, bleeding risk/precautions, constipation, diarrhea, mouth sores, taste alteration, loss of appetite, nausea/vomiting, peripheral neuropathy, skin/nail changes, rash, muscle aches/weakness, photosensitivity, weight gain/loss, hearing loss, dizziness, menopausal symptoms, menstrual irregularity, sterility, high blood pressure, heart damage, liver damage, lung damage, kidney damage, DVT/PE risk, fluid retention, pleural/pericardial effusion, somnolence, electrolyte/LFT imbalance, vein exercises and/or the possible need for vascular access/port placement.  The patient was advised that although uncommon, leakage of an infused medication from the vein or venous access device may lead to skin breakdown and/or other tissue damage.  The patient was advised that he/she may have pain, bleeding, and/or bruising from the insertion of a needle in their vein or venous access device (port).  The patient was further advised that, in spite of proper technique, infection with redness and irritation may rarely occur at the site where the needle was inserted.  The patient was advised that if complications occur, additional medical treatment is available.  Finally, where applicable we have reviewed rare but potential immune mediated side effects including shortness of breath, cough, chest pain (pneumonitis), abdominal pain, diarrhea (colitis), thyroiditis (hypothyroid or hyperthyroid), hepatitis and liver dysfunction, nephritis and renal dysfunction.    Discussion also included side effects specific to drugs in the treatment plan, specifically:    Treatment Plans       Name Type Plan Dates Plan Provider          Active    OP LUNG PEMEtrexed / CISplatin + XRT ONCOLOGY TREATMENT  10/6/2024 - Present Veronica Huynh MD PhD                      Questions answered and additional information discussed on topics including:  Anemia, Thrombocytopenia, Neutropenia, Nutrition and appetite changes, Constipation, Diarrhea, Nausea & vomiting, Mouth sores, Alopecia, Infertility, Intimate activity, Nervous system changes, Pain, Skin & nail changes, Organ toxicities, Home care, and Vaccinations       Assessment and Plan:    Diagnoses and all orders for this visit:    1. Encounter for education (Primary)    2. Malignant neoplasm of upper lobe of right lung    3. 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      The patient and I have reviewed their diagnosis and scheduled treatment plan. Needs assessment was completed where applicable including genetics, psychosocial needs, barriers to care, VAD evaluation, advanced care planning, survivorship, and palliative care services where indicated. Referrals have been ordered as appropriate based upon evaluation today and patient desires.   Chemo/biotherapy teaching was completed today and consent obtained. See separate documentation for further details.  Adequate time was given to answer questions.  Patient made aware of their care team members and contact information if they have questions or problems throughout the treatment course.  Discussion held and written information provided describing frequency of office visits and ongoing monitoring throughout the treatment plan.     Reviewed with patient any prescribed medication sent to pharmacy.  Education provided regarding proper storage, safe handling, and proper disposal of unused medication.  Proper handling of body fluids and waste discussed and written information provided.  If appropriate, patient had pretreatment labs drawn today.  Plan for port placement and follow with Dr. Huynh as previously scheduled  B12  injection today    Learning assessment completed at initial patient encounter. See separate flowsheet. Chemo/biotherapy education comprehension assessed at today's visit.    I spent 62 minutes caring for Laura on this date of service. This time includes time spent by me in the following activities: preparing for the visit, reviewing tests, performing a medically appropriate examination and/or evaluation, counseling and educating the patient/family/caregiver, ordering medications, tests, or procedures, referring and communicating with other health care professionals, documenting information in the medical record, and care coordination.     Rachel Holland PA-C   10/03/24

## 2024-10-04 LAB
MYCOBACTERIUM SPEC CULT: NORMAL
NIGHT BLUE STAIN TISS: NORMAL

## 2024-10-07 ENCOUNTER — DOCUMENTATION (OUTPATIENT)
Dept: ONCOLOGY | Facility: HOSPITAL | Age: 64
End: 2024-10-07
Payer: COMMERCIAL

## 2024-10-09 PROCEDURE — 77301 RADIOTHERAPY DOSE PLAN IMRT: CPT | Performed by: INTERNAL MEDICINE

## 2024-10-09 PROCEDURE — 77300 RADIATION THERAPY DOSE PLAN: CPT | Performed by: INTERNAL MEDICINE

## 2024-10-09 PROCEDURE — 77293 RESPIRATOR MOTION MGMT SIMUL: CPT | Performed by: INTERNAL MEDICINE

## 2024-10-09 PROCEDURE — 77338 DESIGN MLC DEVICE FOR IMRT: CPT | Performed by: INTERNAL MEDICINE

## 2024-10-09 RX ORDER — PALONOSETRON 0.05 MG/ML
0.25 INJECTION, SOLUTION INTRAVENOUS ONCE
Status: CANCELLED | OUTPATIENT
Start: 2024-10-14

## 2024-10-09 RX ORDER — SODIUM CHLORIDE 9 MG/ML
20 INJECTION, SOLUTION INTRAVENOUS ONCE
Status: CANCELLED | OUTPATIENT
Start: 2024-10-14

## 2024-10-09 NOTE — PROGRESS NOTES
HEMATOLOGY ONCOLOGY OUTPATIENT FOLLOW UP       Patient name: Laura Higginbotham  : 1960  MRN: [id number]  Primary Care Physician: Laura Archer  Referring Physician: No ref. provider found  Reason For Consult: Primary lung cancer, right    History of Present Illness:  Patient is a 64 y.o. PMH of smoking, skin cancer, arthritis, GERD who presents for newly diagnosed lung cancer.    -2024 Pt went to Dr Archer for increased nonproductive coughing and feeling more dysnpeic on exertion. Was dx with pleurisy and given prednisone which helped somewhat.  -2024 Since cough hadn't completely gone away in 6 weeks, patient went back to Dr. Archer who became concerned and ordered a CXR and antibiotics.  -CXR showed a right hilar mass, CT was ordered.    -2024 CT chest at [facility]: Revealed a very large right hilar mass measuring 9.2 x 9.5 cm in diameter with hypodensity centrally suggesting a necrotic center.  There is obstruction of the right upper lobe bronchus with the infiltrate seen extending peripheral to that area and marked elevation of the right hilum.  Mass extends into the right hilar lymph nodes.    Patient was seen in consultation by Dr. Crystal Powell, pulmonology. She denied having a cough, hemoptysis, wheezing, dyspnea, fever, unintentional weight loss.    -2020 for EBUS bronchoscopy by Dr. Crystal Powell with FNA to the right hilar node 10R and to the right hilar mass revealing inés revealing malignant cells favoring non-small cell.    Pathology showed in the right hilar mass IHC positive for CK7, TTF-1, and Napsin, negative for p63 and CK/6 consistent with pulmonary adenocarcinoma negative for CD56, chromogranin, synaptophysin excluding neuroendocrine component.  Per discussion with pathology: Passes from the right hilar mass were found to be acellular, viable tissue was from the lymph node biopsied and 1 cassette is available for future testing.  -Pneumocystis was negative,  respiratory  culture showing rare growth of normal respiratory alexi with no S. aureus or Pseudomonas aeruginosa detected.,  Respiratory panel PCR negative, PRELIM: no acid-fast bacilli seen on concentrated smear at 1 week, No isolated fungus at 1 week,.  -8/30/2024 CBC showed WBC 11.53, hemoglobin 13.1, hematocrit 40.7 with essentially normal indices, platelets 332K    -9/12/2024 Patient presented for initial consultation with her niece reporting constant nonproductive cough, she still denies having hemoptysis, wheezing, dyspnea, fever, or unintentional weight loss, new bony pains, no new H/A or focal neuro deficit except right under arm new tingling/burning.  She has a history of smoking 1 PPD- started at 17, this summer cut down to 1/2 PPD mostly because of the coughing (47+ years).    Subjective:  -9/20/2024 MRI brain with without contrast with no acute intracranial process identified, findings suggestive of minimal chronic small vessel ischemic disease but no evidence of intracranial metastasis    -9/23/2025 Caris shows PD-L1 positive with TPS 80% by PDL1 test 22C3, with level 1 evidence for cemiplimab and pembrolizumab, positive by PD-L1 28-8 with level 1 evidence for nivolumab/ipilimumab combination, positive by PD-L1  with TC 1+, 50% with atezolizumab in the metastatic setting level 1 evidence, and PD-L1 positive and  positive TC 1+60% with a benefit of atezolizumab in the adjuvant setting, and cemiplimab level 1 evidence.  -ALK negative/fusion not detected by RNA in the tumor, BRAF mutation not detected, EGFR mutation not detected, MET variant transcript not detected, RET fusion not detected, ROS1 fusion not detected.  Tumor mutation burden was high at 18, microsatellite stable.  KRAS pG12V found and 61%, TP53 sN684F found in 56%, NFKBIA was amplified    -9/25/2024 NM PET/CT skull initial staging: Heterogeneous right upper lobe lung mass extending into the superior right lower lobe, encasing the right mainstem  bronchus, encasing and narrowing the right upper lobe bronchus, is hypermetabolic (mSUV 8.76), consistent with malignancy. It has heterogeneous areas of photopenia, suggesting internal necrosis. Pre-carinal lymph node measuring 14 mm short axis is FDG avid (mSUV 4.0), consistent with malignancy.  No hybrid avidity in the neck, abdomen, pelvis, or skeleton. No left hilar adenopathy. No suspicious left lung nodules. Normal heart size with coronary calcifications. Benign calcified mediastinal and left hilar lymph nodes are present.    -10/2/2024 Patient presents in follow up with her niece reporting better nonproductive cough and rib pain since being oxycodone low dose, she still denies having hemoptysis, wheezing, dyspnea, fever, or unintentional weight loss, new bony pains, no new H/A or focal neuro deficit except right under arm new tingling/burning. Got SIMMED yesterday for radiation.  She has a history of smoking 1 PPD- started at 17, still on 1/2 PPD - gum stuck to dentures.      Subjective:  -10/3/2024 B12 1000 mcg given IM  -10/10 s/p Mediport placement    -10/14/2024 Patient presents in follow up ready to start chemoradiation reporting still with nonproductive cough and rib pain since being oxycodone low dose, she still denies having hemoptysis, wheezing, dyspnea, fever, or unintentional weight loss, new bony pains, no new H/A or focal neuro deficit except right under arm new tingling/burning.  -Picked up her nausea medications, got her port,  her Emla cream (ports still a little tender), started her folate soon as she got it.  Did get her chemo teach and got her B12 shot as well she is ready to go.    Past Medical History:   Diagnosis Date    Arthritis     Cancer     skin cancer on leg right    GERD (gastroesophageal reflux disease)      Past Surgical History:   Procedure Laterality Date    BRONCHOSCOPY N/A 8/30/2024    Procedure: BRONCHOSCOPY WITH BRONCHIAL WASHING ,ENDOBRONCHIAL ULTRASOUND WITH FINE  NEEDLE ASPIRATION X2 AREAS;  Surgeon: Crystal Powell MD;  Location: [facility] ENDOSCOPY;  Service: Pulmonary;  Laterality: N/A;     SECTION      x3    KNEE SURGERY Left     ORIF, screws and plates, has had Operation on it x 3       Current Outpatient Medications:     Breztri Aerosphere 160-9-4.8 MCG/ACT aerosol inhaler, Inhale 2 puffs 2 (Two) Times a Day., Disp: , Rfl:     dexAMETHasone (DECADRON) 4 MG tablet, Take 1 tablet twice a day for 3 consecutive days beginning day before chemo, Disp: 24 tablet, Rfl: 2    famotidine (PEPCID) 20 MG tablet, Take 1 tablet by mouth 2 (Two) Times a Day., Disp: 30 tablet, Rfl: 2    folic acid (Folate) 400 MCG tablet, Take 1 tablet by mouth Daily for 270 days., Disp: 90 tablet, Rfl: 2    lidocaine-prilocaine (EMLA) 2.5-2.5 % cream, Apply 1 Application topically to the appropriate area as directed Every 2 (Two) Hours As Needed for Mild Pain. To port site before chemo/blood draw, Disp: 30 g, Rfl: 2    metFORMIN ER (GLUCOPHAGE-XR) 750 MG 24 hr tablet, Take 1 tablet by mouth Daily With Dinner., Disp: , Rfl:     OLANZapine (zyPREXA) 5 MG tablet, Take one tablet every night for four nights starting the night of chemotherapy, Disp: 12 tablet, Rfl: 1    ondansetron ODT (ZOFRAN-ODT) 8 MG disintegrating tablet, Take 1 tablet by mouth Every 8 (Eight) Hours As Needed for Nausea or Vomiting., Disp: 45 tablet, Rfl: 3    oxyCODONE (ROXICODONE) 5 MG/5ML solution, Take 5 mL by mouth Every 4 (Four) Hours As Needed for Moderate Pain., Disp: 200 mL, Rfl: 0    albuterol sulfate  (90 Base) MCG/ACT inhaler, Inhale 1 puff Every 4 (Four) Hours As Needed for Wheezing. (Patient not taking: Reported on 10/14/2024), Disp: , Rfl:     benzonatate (TESSALON) 200 MG capsule, Take 100 mg by mouth 3 (Three) Times a Day As Needed for Cough. (Patient not taking: Reported on 10/14/2024), Disp: , Rfl:     escitalopram (LEXAPRO) 10 MG tablet, Take 1 tablet by mouth Daily. (Patient not taking: Reported on  10/14/2024), Disp: , Rfl:     gabapentin (NEURONTIN) 100 MG capsule, Take 1 capsule by mouth 2 (Two) Times a Day. (Patient not taking: Reported on 10/14/2024), Disp: , Rfl:     LORazepam (ATIVAN) 1 MG tablet, Take 1 tablet by mouth Every 8 (Eight) Hours As Needed for Anxiety. (Patient not taking: Reported on 10/14/2024), Disp: , Rfl:     nicotine polacrilex (GoodSense Nicotine) 4 MG gum, Chew 1 each As Needed for Smoking Cessation. (Patient not taking: Reported on 10/14/2024), Disp: 120 each, Rfl: 4    predniSONE (DELTASONE) 10 MG tablet, Take 1 tablet by mouth 2 (Two) Times a Day. X 30 days (Patient not taking: Reported on 10/14/2024), Disp: , Rfl:   No current facility-administered medications for this visit.    Facility-Administered Medications Ordered in Other Visits:     CISplatin (PLATINOL) 144 mg in sodium chloride 0.9 % 644 mL chemo IVPB, 75 mg/m2 (Treatment Plan Recorded), Intravenous, Once, Veronica Huynh MD PhD    FOSAPREPITANT 150 MG/100ML NORMAL SALINE (CBC) IVPB 100 mL 100 mL, 150 mg, Intravenous, Once, Veronica Huynh MD PhD    heparin injection 500 Units, 500 Units, Intravenous, PRN, Veronica Huynh MD PhD    mag sulfate 1 g + potassium chloride 20 mEq in 1000 mL NaCl, 1,000 mL, Intravenous, Once, Veronica Huynh MD PhD, Last Rate: 500 mL/hr at 10/14/24 0858, 1,000 mL at 10/14/24 0858    mag sulfate 1 g + potassium chloride 20 mEq in 1000 mL NaCl, 1,000 mL, Intravenous, Once, Veronica Huynh MD PhD    palonosetron (ALOXI) injection 0.25 mg, 0.25 mg, Intravenous, Once, Veronica Huynh MD PhD    PEMEtrexed (ALIMTA) 960 mg in sodium chloride 0.9 % 100 mL chemo IVPB, 500 mg/m2 (Treatment Plan Recorded), Intravenous, Once, Veronica Huynh MD PhD    sodium chloride 0.9 % flush 10 mL, 10 mL, Intravenous, PRN, Veronica Huynh MD PhD    sodium chloride 0.9 % infusion, 20 mL/hr, Intravenous, Once, Veronica Huynh MD PhD    No Known  Allergies    Family History   Problem Relation Age of Onset    Lung cancer Brother         Lung cancer/Leukemia    Cancer Brother        Cancer-related family history includes Cancer in her brother; Lung cancer in her brother.    Social History     Tobacco Use    Smoking status: Every Day     Current packs/day: 0.50     Average packs/day: 0.5 packs/day for 47.8 years (23.9 ttl pk-yrs)     Types: Cigarettes     Start date: 1977    Smokeless tobacco: Never   Vaping Use    Vaping status: Never Used   Substance Use Topics    Alcohol use: Not Currently    Drug use: Never     Social History     Social History Narrative    Not on file      Review of Systems:  Constitutional: Denies any weakness, fatigue, lack of appetite, excessive appetite, weight change, chills or fever.  Eyes: Reports no blurry vision, no double vision, no pain in the eyes, dry eyes or tearing.  ENT: Reports no decreased hearing capacity, ear pain or tinnitus. Denies any epistaxis, nasal congestion, mouth sores or bleeding, tooth or jaw pain, sore throat or hoarseness.  Respiratory: + chronic cough, Denies any sputum production or hemoptysis. There is no wheezing, shortness of breath or any other respiratory complaints.  Cardiovascular: + shortness of breath with activity, Denies any chest pain, shortness of breath when lying down, palpitations, or leg swelling.  Breasts: Denies any lumps, bumps, pain, nipple changes or discharge in the breasts.  Gastrointestinal: There are no complaints of abdominal pain, difficulty swallowing or painful swallowing, anorexia, nausea, vomiting, diarrhea, constipation, heartburn, blood in the stools, dark stools, or change in bowel habits or hemorrhoids.  Genitourinary: Denies any pain or burning on urination, blood in the urine or frequent urination.   Musculoskeletal: Denies painful joints, no swelling of the joints, muscle or back pain. Denies any pain or tingling in the legs, hands or feet.  Neuropsychiatric: Denies  "any nervousness, depressed mood, headaches, blackouts, dizziness, weakness of limbs, loss of sensation, loss of balance, loss of coordination or difficulty in speaking.  Cutaneous: Denies any dry skin, itching, rash, change in the color of the skin, or any other cutaneous complaints.  Hematologic/Lymphatic: Denies any bruising or bleeding. Report no recent development of palpable or painful lymph nodes.  Allergy/Immunology: Denies rash/hayfever symptoms or any recent history of pneumonia, recurrent sinusitis, urinary infection or any other history of an ongoing infection.  Endocrine: Reports no intolerance to heat or cold, excessive thirst or excessive urination.  Objective:  Vital signs:  Vitals:    10/14/24 0758   BP: 132/74   Pulse: 78   Temp: 97.8 °F (36.6 °C)   TempSrc: Oral   SpO2: 99%   Weight: 76.5 kg (168 lb 9.6 oz)   Height: 172.7 cm (67.99\")   PainSc: 0-No pain     Body mass index is 25.64 kg/m².      Physical Exam:   ECO  GENERAL: The patient is a pleasant middle aged white female who appears their stated age and is awake, in no acute distress, alert and oriented x3.  SKIN: No rash or ulcers.  HEME/LYMPHATICS: No bruising or petechiae on visual inspection. No lymphadenopathy on visual inspection and palpation of cervical, supraclavicular, infraclavicular lymph nodes.  HEAD/FACE: atraumatic and normocephalic. Normal hair.  EYES: PERRLA/EOMI. No scleral icterus. No tearing or dry eyes.  ENT: External ears normal with no evidence of discharge. No evidence of ulceration or bleeding in the nostrils. Mouth has no ulcers, bleeding or inflammation of the gums, floor or roof of the mouth with normal dentition.  NECK: Supple, symmetric.   CHEST/RESPIRATORY: Expansion maintained bilaterally and symmetrically. On auscultation, clear breath sounds in left lung, decreased breath sounds in upper right lung field. No wheezes, rhonchi or rales.  BREAST: Deferred.  CARDIOVASCULAR: On auscultation, regular rate and " rhythm. No murmurs, gallops or rubs are heard.  GASTROINTESTINAL/ABDOMEN: Symmetric. On auscultation of the abdomen, there are normal bowel sounds in all four quadrants. There are no surgical scars in the abdomen. Nontender to palpation.  GENITOURINARY: Deferred.  NEUROLOGIC: Alert and oriented time, person, and place, with no apparent changes in recent or remote memory. Cranial nerves II-XII are grossly intact. Sensation is maintained in the extremities. Strength and tone appear normal for age and equal in the extremities. Gait is normal.  MUSCULOSKELETAL: No cyanosis, clubbing or edema in the extremities. There are no surgical scars on the extremities.  PSYCHIATRIC: The patient maintains judgment and has good insight. The patient has no changes in mood or affection.  IV: right chest Mediport accessed, NTTP, no erythema, edema or pus.        Lab Results - Last 18 Months   Lab Units 10/14/24  0748 10/10/24  0810 09/12/24  1151   WBC 10*3/mm3 12.12* 8.77 8.26   HEMOGLOBIN g/dL 12.3 11.8* 12.1   HEMATOCRIT % 38.3 37.0 38.3   PLATELETS 10*3/mm3 477* 391 425   MCV fL 85.5 84.3 84.5     Lab Results - Last 18 Months   Lab Units 10/14/24  0752 09/12/24  1151   SODIUM mmol/L  --  134*   POTASSIUM mmol/L  --  4.3   CHLORIDE mmol/L  --  98   CO2 mmol/L  --  25.5   BUN mg/dL  --  8   CREATININE mg/dL 0.80 0.73   CALCIUM mg/dL  --  9.6   BILIRUBIN mg/dL  --  0.2   ALK PHOS U/L  --  104   ALT (SGPT) U/L  --  12   AST (SGOT) U/L  --  12   GLUCOSE mg/dL  --  251*       Lab Results   Component Value Date    GLUCOSE 251 (H) 09/12/2024    BUN 8 09/12/2024    CREATININE 0.80 10/14/2024    BCR 11.0 09/12/2024    K 4.3 09/12/2024    CO2 25.5 09/12/2024    CALCIUM 9.6 09/12/2024    ALBUMIN 3.9 09/12/2024    AST 12 09/12/2024    ALT 12 09/12/2024       Lab Results - Last 18 Months   Lab Units 10/10/24  0810 08/26/24  0945   INR  <0.93* 0.93   APTT seconds 30.8 27.1       Lab Results   Component Value Date    PTT 30.8 10/10/2024    INR  <0.93 (L) 10/10/2024       Assessment & Plan     Right hilar mass/right upper lobe adenocarcinoma, at minimum T4 N2 (IIIB)     Discussed the above results which include imaging and pathology with the patient.  -Staging PET/CT no metastatic disease but confirmed IIIB disease, nonsurgical candidate-previously discussed this in detail with the patient and her family and explained that the standard of care at this point would be chemoradiation with curative intent.  Discussed the couple chemotherapy regimens that can be used for radiation.  -Staging brain MRI negative for metastasis    -Radiology Oncology consult-on board for concurrent chemoradiation   -PFTs already ordered and scheduled by her pulmonologist per patient.    -Biomarker testing +PD-L1 testing (category 1), +KRAS G12C, negative EGFR mutation including exon 19 del, L858R and other deletions, exon 20 insertions, (category 1), ALK fusions (category 1), ROS1 fusions, BRAF V600E, NTRK 1/2/3 fusions, MET exon 14 skipping or amplifications, RET fusions, ErbB2 (HER2) alterations,    PLAN: cisplatin plus pemetrexed Q21 days X 3 cycles with concurrent radiation with repeat imaging approx 30 days after, followed by durvalumab 1500 mg Q4 weeks x 12 cycles (category 1 for stage III)     - s/p B12 shot one week prior to chemo (will then be next due C3),  - folic acid 1 mg daily and informed pt to start taking immediately     -got chemoteach (can get first B12 injection at that time)  -10/10 s/p Mediport placement with EMLA cream available   -Will do CBC, CMP, mag while on treatment    Physical exam and labs within parameters, proceed with cycle 1 chemotherapy (also starting radiation today) return to clinic 11/4 with labs for C2 chemotherapy    -*Data has shown a Palliative consult to help manage symptoms early on in care for advanced stage lung cancer patients.      2. chemotherapy associated nausea/vomiting  -preventative palonosetron 0.25 mg IV, fosaprepitant 150 mg  IV, day 2 PO dexamethasone 4 mg twice daily T- 1, T0, T+ 1 take as directed  -PRN Zyprexa, and as needed Zofran      3. Tobacco dependence  -tried nicotine gum but stuck to dentures, is already cutting back on her own      Thank you very much for providing the opportunity to participate in this patient’s care. Please do not hesitate to call if there are any other questions.

## 2024-10-10 ENCOUNTER — HOSPITAL ENCOUNTER (OUTPATIENT)
Dept: INTERVENTIONAL RADIOLOGY/VASCULAR | Facility: HOSPITAL | Age: 64
Discharge: HOME | End: 2024-10-10
Payer: COMMERCIAL

## 2024-10-10 VITALS
RESPIRATION RATE: 11 BRPM | BODY MASS INDEX: 26.67 KG/M2 | DIASTOLIC BLOOD PRESSURE: 60 MMHG | TEMPERATURE: 98.1 F | HEART RATE: 79 BPM | WEIGHT: 176 LBS | HEIGHT: 68 IN | SYSTOLIC BLOOD PRESSURE: 108 MMHG | OXYGEN SATURATION: 96 %

## 2024-10-10 DIAGNOSIS — C34.90 NON-SMALL CELL LUNG CANCER METASTATIC TO INTRATHORACIC LYMPH NODE: ICD-10-CM

## 2024-10-10 DIAGNOSIS — C34.11 MALIGNANT NEOPLASM OF UPPER LOBE OF RIGHT LUNG: ICD-10-CM

## 2024-10-10 DIAGNOSIS — C77.1 NON-SMALL CELL LUNG CANCER METASTATIC TO INTRATHORACIC LYMPH NODE: ICD-10-CM

## 2024-10-10 LAB
APTT PPP: 30.8 SECONDS (ref 24–31)
BASOPHILS # BLD AUTO: 0.02 10*3/MM3 (ref 0–0.2)
BASOPHILS NFR BLD AUTO: 0.2 % (ref 0–1.5)
DEPRECATED RDW RBC AUTO: 48.3 FL (ref 37–54)
EOSINOPHIL # BLD AUTO: 0.1 10*3/MM3 (ref 0–0.4)
EOSINOPHIL NFR BLD AUTO: 1.1 % (ref 0.3–6.2)
ERYTHROCYTE [DISTWIDTH] IN BLOOD BY AUTOMATED COUNT: 15.7 % (ref 12.3–15.4)
HCT VFR BLD AUTO: 37 % (ref 34–46.6)
HGB BLD-MCNC: 11.8 G/DL (ref 12–15.9)
IMM GRANULOCYTES # BLD AUTO: 0.03 10*3/MM3 (ref 0–0.05)
IMM GRANULOCYTES NFR BLD AUTO: 0.3 % (ref 0–0.5)
INR PPP: <0.93 (ref 0.93–1.1)
LYMPHOCYTES # BLD AUTO: 2.08 10*3/MM3 (ref 0.7–3.1)
LYMPHOCYTES NFR BLD AUTO: 23.7 % (ref 19.6–45.3)
MCH RBC QN AUTO: 26.9 PG (ref 26.6–33)
MCHC RBC AUTO-ENTMCNC: 31.9 G/DL (ref 31.5–35.7)
MCV RBC AUTO: 84.3 FL (ref 79–97)
MONOCYTES # BLD AUTO: 0.54 10*3/MM3 (ref 0.1–0.9)
MONOCYTES NFR BLD AUTO: 6.2 % (ref 5–12)
MRSA DNA SPEC QL NAA+PROBE: NORMAL
NEUTROPHILS NFR BLD AUTO: 6 10*3/MM3 (ref 1.7–7)
NEUTROPHILS NFR BLD AUTO: 68.5 % (ref 42.7–76)
NRBC BLD AUTO-RTO: 0 /100 WBC (ref 0–0.2)
PLATELET # BLD AUTO: 391 10*3/MM3 (ref 140–450)
PMV BLD AUTO: 8.3 FL (ref 6–12)
PROTHROMBIN TIME: 10.1 SECONDS (ref 9.6–11.7)
RBC # BLD AUTO: 4.39 10*6/MM3 (ref 3.77–5.28)
WBC NRBC COR # BLD AUTO: 8.77 10*3/MM3 (ref 3.4–10.8)

## 2024-10-10 PROCEDURE — C1788 PORT, INDWELLING, IMP: HCPCS

## 2024-10-10 PROCEDURE — 77001 FLUOROGUIDE FOR VEIN DEVICE: CPT

## 2024-10-10 PROCEDURE — 25010000002 LIDOCAINE 1% - EPINEPHRINE 1:100000 1 %-1:100000 SOLUTION

## 2024-10-10 PROCEDURE — 25010000002 MIDAZOLAM PER 1 MG

## 2024-10-10 PROCEDURE — 99152 MOD SED SAME PHYS/QHP 5/>YRS: CPT

## 2024-10-10 PROCEDURE — 25010000002 CEFAZOLIN PER 500 MG

## 2024-10-10 PROCEDURE — 25010000002 FENTANYL CITRATE (PF) 50 MCG/ML SOLUTION

## 2024-10-10 PROCEDURE — 25010000002 HEPARIN LOCK FLUSH PER 10 UNITS

## 2024-10-10 PROCEDURE — 25010000002 ONDANSETRON PER 1 MG

## 2024-10-10 PROCEDURE — 99153 MOD SED SAME PHYS/QHP EA: CPT

## 2024-10-10 PROCEDURE — 25010000002 LIDOCAINE 1 % SOLUTION

## 2024-10-10 PROCEDURE — 87641 MR-STAPH DNA AMP PROBE: CPT | Performed by: INTERNAL MEDICINE

## 2024-10-10 PROCEDURE — 85730 THROMBOPLASTIN TIME PARTIAL: CPT | Performed by: RADIOLOGY

## 2024-10-10 PROCEDURE — C1894 INTRO/SHEATH, NON-LASER: HCPCS

## 2024-10-10 PROCEDURE — 85025 COMPLETE CBC W/AUTO DIFF WBC: CPT | Performed by: RADIOLOGY

## 2024-10-10 PROCEDURE — 85610 PROTHROMBIN TIME: CPT | Performed by: RADIOLOGY

## 2024-10-10 PROCEDURE — 76937 US GUIDE VASCULAR ACCESS: CPT

## 2024-10-10 RX ORDER — LIDOCAINE HYDROCHLORIDE AND EPINEPHRINE 10; 10 MG/ML; UG/ML
INJECTION, SOLUTION INFILTRATION; PERINEURAL AS NEEDED
Status: COMPLETED | OUTPATIENT
Start: 2024-10-10 | End: 2024-10-10

## 2024-10-10 RX ORDER — SODIUM CHLORIDE 0.9 % (FLUSH) 0.9 %
10 SYRINGE (ML) INJECTION AS NEEDED
Status: DISCONTINUED | OUTPATIENT
Start: 2024-10-10 | End: 2024-10-11 | Stop reason: HOSPADM

## 2024-10-10 RX ORDER — LIDOCAINE HYDROCHLORIDE 10 MG/ML
INJECTION, SOLUTION INFILTRATION; PERINEURAL AS NEEDED
Status: COMPLETED | OUTPATIENT
Start: 2024-10-10 | End: 2024-10-10

## 2024-10-10 RX ORDER — SODIUM CHLORIDE 0.9 % (FLUSH) 0.9 %
10 SYRINGE (ML) INJECTION EVERY 12 HOURS SCHEDULED
Status: DISCONTINUED | OUTPATIENT
Start: 2024-10-10 | End: 2024-10-11 | Stop reason: HOSPADM

## 2024-10-10 RX ORDER — ONDANSETRON 2 MG/ML
INJECTION INTRAMUSCULAR; INTRAVENOUS AS NEEDED
Status: COMPLETED | OUTPATIENT
Start: 2024-10-10 | End: 2024-10-10

## 2024-10-10 RX ORDER — MIDAZOLAM HYDROCHLORIDE 1 MG/ML
INJECTION INTRAMUSCULAR; INTRAVENOUS AS NEEDED
Status: COMPLETED | OUTPATIENT
Start: 2024-10-10 | End: 2024-10-10

## 2024-10-10 RX ORDER — HEPARIN SODIUM (PORCINE) LOCK FLUSH IV SOLN 100 UNIT/ML 100 UNIT/ML
SOLUTION INTRAVENOUS AS NEEDED
Status: COMPLETED | OUTPATIENT
Start: 2024-10-10 | End: 2024-10-10

## 2024-10-10 RX ORDER — FENTANYL CITRATE 50 UG/ML
INJECTION, SOLUTION INTRAMUSCULAR; INTRAVENOUS AS NEEDED
Status: COMPLETED | OUTPATIENT
Start: 2024-10-10 | End: 2024-10-10

## 2024-10-10 RX ADMIN — Medication 10 ML: at 10:30

## 2024-10-10 RX ADMIN — FENTANYL CITRATE 100 MCG: 50 INJECTION, SOLUTION INTRAMUSCULAR; INTRAVENOUS at 10:26

## 2024-10-10 RX ADMIN — Medication 10 ML: at 10:00

## 2024-10-10 RX ADMIN — LIDOCAINE HYDROCHLORIDE 6 ML: 10 INJECTION, SOLUTION INFILTRATION; PERINEURAL at 10:24

## 2024-10-10 RX ADMIN — LIDOCAINE HYDROCHLORIDE 4 ML: 10 INJECTION, SOLUTION INFILTRATION; PERINEURAL at 10:26

## 2024-10-10 RX ADMIN — Medication 10 ML: at 10:27

## 2024-10-10 RX ADMIN — FENTANYL CITRATE 100 MCG: 50 INJECTION, SOLUTION INTRAMUSCULAR; INTRAVENOUS at 10:29

## 2024-10-10 RX ADMIN — FENTANYL CITRATE 100 MCG: 50 INJECTION, SOLUTION INTRAMUSCULAR; INTRAVENOUS at 10:19

## 2024-10-10 RX ADMIN — ONDANSETRON 4 MG: 2 INJECTION INTRAMUSCULAR; INTRAVENOUS at 10:12

## 2024-10-10 RX ADMIN — LIDOCAINE HYDROCHLORIDE,EPINEPHRINE BITARTRATE 10 ML: 10; .01 INJECTION, SOLUTION INFILTRATION; PERINEURAL at 10:26

## 2024-10-10 RX ADMIN — MIDAZOLAM 1 MG: 1 INJECTION INTRAMUSCULAR; INTRAVENOUS at 10:19

## 2024-10-10 RX ADMIN — Medication 10 ML: at 10:20

## 2024-10-10 RX ADMIN — MIDAZOLAM 1 MG: 1 INJECTION INTRAMUSCULAR; INTRAVENOUS at 10:26

## 2024-10-10 RX ADMIN — Medication 500 UNITS: at 10:32

## 2024-10-10 RX ADMIN — CEFAZOLIN 2000 MG: 1 INJECTION, POWDER, FOR SOLUTION INTRAMUSCULAR; INTRAVENOUS at 09:57

## 2024-10-10 NOTE — H&P
[facility]   Interventional Radiology H&P    Patient Name: Laura Higginbotham  : 1960  MRN: [id number]  Primary Care Physician:  Laura Archer  Referring Physician: Veronica Huynh, *  Date of admission: 10/10/2024    Subjective   Subjective     HPI:  Laura Higginbotham is a 64 y.o. female with lung cancer.    Review of Systems:   Constitutional no fever,  no weight loss       Otolaryngeal no difficulty swallowing   Cardiovascular no chest pain   Pulmonary no cough, no sputum production   Gastrointestinal no constipation, no diarrhea                         Personal History       Past Medical/Surgical History:   Past Medical History:   Diagnosis Date    Arthritis     Cancer     skin cancer on leg right    GERD (gastroesophageal reflux disease)      Past Surgical History:   Procedure Laterality Date    BRONCHOSCOPY N/A 2024    Procedure: BRONCHOSCOPY WITH BRONCHIAL WASHING ,ENDOBRONCHIAL ULTRASOUND WITH FINE NEEDLE ASPIRATION X2 AREAS;  Surgeon: Crystal Powell MD;  Location: [facility] ENDOSCOPY;  Service: Pulmonary;  Laterality: N/A;     SECTION      x3    KNEE SURGERY Left     ORIF, screws and plates, has had Operation on it x 3       Social History:  reports that she has been smoking cigarettes. She started smoking about 47 years ago. She has a 23.9 pack-year smoking history. She has never used smokeless tobacco. She reports that she does not currently use alcohol. She reports that she does not use drugs.    Medications:  (Not in a hospital admission)    Current medications:  sodium chloride, 10 mL, Intravenous, Q12H      Current IV drips:       Allergies:  No Known Allergies    Objective    Objective     Vitals:   Temp:  [98.1 °F (36.7 °C)] 98.1 °F (36.7 °C)  Heart Rate:  [81] 81  Resp:  [12] 12  BP: (115)/(52) 115/52      Physical Exam:   Constitutional: Awake, alert, No acute distress    Respiratory: Clear to auscultation bilaterally, nonlabored respirations    Cardiovascular: RRR, no murmurs, rubs,  "or gallops, palpable pedal pulses bilaterally   Gastrointestinal: Positive bowel sounds, soft, nontender, nondistended        ASA SCALE ASSESSMENT:  2-Mild to moderate systemic disease, medically well controlled, with no functional limitation    MALLAMPATI CLASSIFICATION:  2-Able to visualize the soft palate, fauces, uvula. The anterior & posterior tonsilar pillars are hidden by the tongue.       Result Review        Result Review:     No results found for: \"NA\"    No results found for: \"K\"    No results found for: \"CL\"    No results found for: \"PLASMABICARB\"    No results found for: \"BUN\"    No results found for: \"CREATININE\"    No results found for: \"CALCIUM\"        No components found for: \"GLUCOSE.*\"  Results from last 7 days   Lab Units 10/10/24  0810   WBC 10*3/mm3 8.77   HEMOGLOBIN g/dL 11.8*   HEMATOCRIT % 37.0   PLATELETS 10*3/mm3 391      Results from last 7 days   Lab Units 10/10/24  0810   INR  <0.93*           Assessment / Plan     Assesment:   Lung cancer.      Plan:   Port placement.     The risks and benefits of the procedure were discussed with the patient.    Electronically signed by SHERIN Oleary, 10/10/24, 9:35 AM EDT.  "

## 2024-10-11 LAB
MYCOBACTERIUM SPEC CULT: NORMAL
NIGHT BLUE STAIN TISS: NORMAL

## 2024-10-14 ENCOUNTER — OFFICE VISIT (OUTPATIENT)
Dept: ONCOLOGY | Facility: CLINIC | Age: 64
End: 2024-10-14
Payer: COMMERCIAL

## 2024-10-14 ENCOUNTER — HOSPITAL ENCOUNTER (OUTPATIENT)
Dept: ONCOLOGY | Facility: HOSPITAL | Age: 64
Discharge: HOME | End: 2024-10-14
Payer: COMMERCIAL

## 2024-10-14 ENCOUNTER — HOSPITAL ENCOUNTER (OUTPATIENT)
Dept: RADIATION ONCOLOGY | Facility: HOSPITAL | Age: 64
Discharge: HOME | End: 2024-10-14
Payer: COMMERCIAL

## 2024-10-14 VITALS
SYSTOLIC BLOOD PRESSURE: 132 MMHG | WEIGHT: 168.6 LBS | DIASTOLIC BLOOD PRESSURE: 74 MMHG | HEART RATE: 78 BPM | OXYGEN SATURATION: 99 % | BODY MASS INDEX: 25.55 KG/M2 | HEIGHT: 68 IN | TEMPERATURE: 97.8 F

## 2024-10-14 DIAGNOSIS — T45.1X5A CHEMOTHERAPY-INDUCED NAUSEA: ICD-10-CM

## 2024-10-14 DIAGNOSIS — C34.11 MALIGNANT NEOPLASM OF UPPER LOBE OF RIGHT LUNG: Primary | ICD-10-CM

## 2024-10-14 DIAGNOSIS — C77.1 NON-SMALL CELL LUNG CANCER METASTATIC TO INTRATHORACIC LYMPH NODE: ICD-10-CM

## 2024-10-14 DIAGNOSIS — C34.90 NON-SMALL CELL LUNG CANCER METASTATIC TO INTRATHORACIC LYMPH NODE: Primary | ICD-10-CM

## 2024-10-14 DIAGNOSIS — C77.1 NON-SMALL CELL LUNG CANCER METASTATIC TO INTRATHORACIC LYMPH NODE: Primary | ICD-10-CM

## 2024-10-14 DIAGNOSIS — R11.0 CHEMOTHERAPY-INDUCED NAUSEA: ICD-10-CM

## 2024-10-14 DIAGNOSIS — C34.90 NON-SMALL CELL LUNG CANCER METASTATIC TO INTRATHORACIC LYMPH NODE: ICD-10-CM

## 2024-10-14 DIAGNOSIS — C34.11 MALIGNANT NEOPLASM OF UPPER LOBE OF RIGHT LUNG: ICD-10-CM

## 2024-10-14 LAB
ALP BLD-CCNC: 85 U/L (ref 42–141)
BASOPHILS # BLD AUTO: 0.04 10*3/MM3 (ref 0–0.2)
BASOPHILS NFR BLD AUTO: 0.3 % (ref 0–1.5)
BUN BLDA-MCNC: 10 MG/DL (ref 7–22)
CALCIUM BLD QL: 10.2 MG/DL (ref 8–10.3)
CHLORIDE BLDA-SCNC: 100 MMOL/L (ref 98–108)
CO2 BLDA-SCNC: 27 MMOL/L (ref 18–33)
CREAT BLDA-MCNC: 0.8 MG/DL (ref 0.6–1.2)
DEPRECATED RDW RBC AUTO: 46.8 FL (ref 37–54)
EGFRCR SERPLBLD CKD-EPI 2021: 82.4 ML/MIN/1.73
EOSINOPHIL # BLD AUTO: 0.05 10*3/MM3 (ref 0–0.4)
EOSINOPHIL NFR BLD AUTO: 0.4 % (ref 0.3–6.2)
ERYTHROCYTE [DISTWIDTH] IN BLOOD BY AUTOMATED COUNT: 15.3 % (ref 12.3–15.4)
GLUCOSE BLDC GLUCOMTR-MCNC: 313 MG/DL (ref 73–118)
HCT VFR BLD AUTO: 38.3 % (ref 34–46.6)
HGB BLD-MCNC: 12.3 G/DL (ref 12–15.9)
LYMPHOCYTES # BLD AUTO: 1.31 10*3/MM3 (ref 0.7–3.1)
LYMPHOCYTES NFR BLD AUTO: 10.8 % (ref 19.6–45.3)
MAGNESIUM SERPL-MCNC: 2 MG/DL (ref 1.6–2.4)
MCH RBC QN AUTO: 27.5 PG (ref 26.6–33)
MCHC RBC AUTO-ENTMCNC: 32.1 G/DL (ref 31.5–35.7)
MCV RBC AUTO: 85.5 FL (ref 79–97)
MONOCYTES # BLD AUTO: 0.46 10*3/MM3 (ref 0.1–0.9)
MONOCYTES NFR BLD AUTO: 3.8 % (ref 5–12)
NEUTROPHILS NFR BLD AUTO: 10.26 10*3/MM3 (ref 1.7–7)
NEUTROPHILS NFR BLD AUTO: 84.7 % (ref 42.7–76)
PLATELET # BLD AUTO: 477 10*3/MM3 (ref 140–450)
PMV BLD AUTO: 8.4 FL (ref 6–12)
POC ALBUMIN: 3.5 G/L (ref 3.3–5.5)
POC ALT (SGPT): 35 U/L (ref 10–47)
POC AST (SGOT): 23 U/L (ref 11–38)
POC TOTAL BILIRUBIN: 0.7 MG/DL (ref 0.2–1.6)
POC TOTAL PROTEIN: 7.9 G/DL (ref 6.4–8.1)
POTASSIUM BLDA-SCNC: 4.1 MMOL/L (ref 3.6–5.1)
RAD ONC ARIA COURSE ID: NORMAL
RAD ONC ARIA COURSE INTENT: NORMAL
RAD ONC ARIA COURSE LAST TREATMENT DATE: NORMAL
RAD ONC ARIA COURSE START DATE: NORMAL
RAD ONC ARIA COURSE TREATMENT ELAPSED DAYS: 0
RAD ONC ARIA FIRST TREATMENT DATE: NORMAL
RAD ONC ARIA PLAN FRACTIONS TREATED TO DATE: 1
RAD ONC ARIA PLAN ID: NORMAL
RAD ONC ARIA PLAN PRESCRIBED DOSE PER FRACTION: 2 GY
RAD ONC ARIA PLAN PRIMARY REFERENCE POINT: NORMAL
RAD ONC ARIA PLAN TOTAL FRACTIONS PRESCRIBED: 30
RAD ONC ARIA PLAN TOTAL PRESCRIBED DOSE: 6000 CGY
RAD ONC ARIA REFERENCE POINT DOSAGE GIVEN TO DATE: 2 GY
RAD ONC ARIA REFERENCE POINT ID: NORMAL
RAD ONC ARIA REFERENCE POINT SESSION DOSAGE GIVEN: 2 GY
RBC # BLD AUTO: 4.48 10*6/MM3 (ref 3.77–5.28)
SODIUM BLD-SCNC: 135 MMOL/L (ref 128–145)
WBC NRBC COR # BLD AUTO: 12.12 10*3/MM3 (ref 3.4–10.8)

## 2024-10-14 PROCEDURE — 25010000002 CISPLATIN PER 50 MG: Performed by: INTERNAL MEDICINE

## 2024-10-14 PROCEDURE — 77386: CPT | Performed by: INTERNAL MEDICINE

## 2024-10-14 PROCEDURE — 25810000003 SODIUM CHLORIDE 0.9 % SOLUTION 500 ML FLEX CONT: Performed by: INTERNAL MEDICINE

## 2024-10-14 PROCEDURE — 77014 CHG CT GUIDANCE RADIATION THERAPY FLDS PLACEMENT: CPT | Performed by: INTERNAL MEDICINE

## 2024-10-14 PROCEDURE — 25810000003 SODIUM CHLORIDE 0.9 % SOLUTION: Performed by: INTERNAL MEDICINE

## 2024-10-14 PROCEDURE — 25010000002 PALONOSETRON 0.25 MG/5ML SOLUTION PREFILLED SYRINGE: Performed by: INTERNAL MEDICINE

## 2024-10-14 PROCEDURE — 25010000002 FOSAPREPITANT PER 1 MG: Performed by: INTERNAL MEDICINE

## 2024-10-14 PROCEDURE — 25010000002 MAGNESIUM SULFATE PER 500 MG OF MAGNESIUM: Performed by: INTERNAL MEDICINE

## 2024-10-14 PROCEDURE — 96375 TX/PRO/DX INJ NEW DRUG ADDON: CPT

## 2024-10-14 PROCEDURE — 80053 COMPREHEN METABOLIC PANEL: CPT

## 2024-10-14 PROCEDURE — 25010000002 PEMETREXED PER 10 MG: Performed by: INTERNAL MEDICINE

## 2024-10-14 PROCEDURE — 96415 CHEMO IV INFUSION ADDL HR: CPT

## 2024-10-14 PROCEDURE — 96360 HYDRATION IV INFUSION INIT: CPT

## 2024-10-14 PROCEDURE — 99214 OFFICE O/P EST MOD 30 MIN: CPT | Performed by: INTERNAL MEDICINE

## 2024-10-14 PROCEDURE — 96366 THER/PROPH/DIAG IV INF ADDON: CPT

## 2024-10-14 PROCEDURE — 25010000002 POTASSIUM CHLORIDE PER 2 MEQ OF POTASSIUM: Performed by: INTERNAL MEDICINE

## 2024-10-14 PROCEDURE — 83735 ASSAY OF MAGNESIUM: CPT | Performed by: INTERNAL MEDICINE

## 2024-10-14 PROCEDURE — 96417 CHEMO IV INFUS EACH ADDL SEQ: CPT

## 2024-10-14 PROCEDURE — 96411 CHEMO IV PUSH ADDL DRUG: CPT

## 2024-10-14 PROCEDURE — 96367 TX/PROPH/DG ADDL SEQ IV INF: CPT

## 2024-10-14 PROCEDURE — 25010000002 HEPARIN LOCK FLUSH PER 10 UNITS: Performed by: INTERNAL MEDICINE

## 2024-10-14 PROCEDURE — 96413 CHEMO IV INFUSION 1 HR: CPT

## 2024-10-14 PROCEDURE — 77427 RADIATION TX MANAGEMENT X5: CPT | Performed by: INTERNAL MEDICINE

## 2024-10-14 PROCEDURE — 85025 COMPLETE CBC W/AUTO DIFF WBC: CPT | Performed by: INTERNAL MEDICINE

## 2024-10-14 RX ORDER — HEPARIN SODIUM (PORCINE) LOCK FLUSH IV SOLN 100 UNIT/ML 100 UNIT/ML
500 SOLUTION INTRAVENOUS AS NEEDED
OUTPATIENT
Start: 2024-10-14

## 2024-10-14 RX ORDER — PALONOSETRON 0.05 MG/ML
0.25 INJECTION, SOLUTION INTRAVENOUS ONCE
Status: COMPLETED | OUTPATIENT
Start: 2024-10-14 | End: 2024-10-14

## 2024-10-14 RX ORDER — HEPARIN SODIUM (PORCINE) LOCK FLUSH IV SOLN 100 UNIT/ML 100 UNIT/ML
500 SOLUTION INTRAVENOUS AS NEEDED
Status: DISCONTINUED | OUTPATIENT
Start: 2024-10-14 | End: 2024-10-15 | Stop reason: HOSPADM

## 2024-10-14 RX ORDER — SODIUM CHLORIDE 0.9 % (FLUSH) 0.9 %
10 SYRINGE (ML) INJECTION AS NEEDED
OUTPATIENT
Start: 2024-10-14

## 2024-10-14 RX ORDER — SODIUM CHLORIDE 0.9 % (FLUSH) 0.9 %
10 SYRINGE (ML) INJECTION AS NEEDED
Status: DISCONTINUED | OUTPATIENT
Start: 2024-10-14 | End: 2024-10-15 | Stop reason: HOSPADM

## 2024-10-14 RX ORDER — SODIUM CHLORIDE 9 MG/ML
20 INJECTION, SOLUTION INTRAVENOUS ONCE
Status: COMPLETED | OUTPATIENT
Start: 2024-10-14 | End: 2024-10-14

## 2024-10-14 RX ADMIN — FOSAPREPITANT 100 ML: 150 INJECTION, POWDER, LYOPHILIZED, FOR SOLUTION INTRAVENOUS at 11:09

## 2024-10-14 RX ADMIN — MAGNESIUM SULFATE HEPTAHYDRATE 1000 ML: 500 INJECTION, SOLUTION INTRAMUSCULAR; INTRAVENOUS at 14:28

## 2024-10-14 RX ADMIN — CISPLATIN 144 MG: 1 INJECTION INTRAVENOUS at 12:03

## 2024-10-14 RX ADMIN — MAGNESIUM SULFATE HEPTAHYDRATE 1000 ML: 500 INJECTION, SOLUTION INTRAMUSCULAR; INTRAVENOUS at 08:58

## 2024-10-14 RX ADMIN — Medication 10 ML: at 16:22

## 2024-10-14 RX ADMIN — HEPARIN 500 UNITS: 100 SYRINGE at 16:22

## 2024-10-14 RX ADMIN — PALONOSETRON 0.25 MG: 0.25 INJECTION, SOLUTION INTRAVENOUS at 11:06

## 2024-10-14 RX ADMIN — PEMETREXED DISODIUM 960 MG: 500 INJECTION, POWDER, LYOPHILIZED, FOR SOLUTION INTRAVENOUS at 11:50

## 2024-10-14 RX ADMIN — SODIUM CHLORIDE 20 ML/HR: 9 INJECTION, SOLUTION INTRAVENOUS at 11:06

## 2024-10-14 NOTE — PATIENT INSTRUCTIONS
Continue meds as we have talked    Continue with cutting back on smoking    Chemo #1 today    Radiation #1 today- be sure to get the times    See you 11/4

## 2024-10-14 NOTE — PROGRESS NOTES
Port accessed and flushed with good blood return noted. 10cc of blood wasted prior to specimen collection. Blood specimen obtained and sent to lab for processing per protocol.  Port flushed with saline sent to waiting room for md visit.

## 2024-10-15 ENCOUNTER — HOSPITAL ENCOUNTER (OUTPATIENT)
Dept: RADIATION ONCOLOGY | Facility: HOSPITAL | Age: 64
Discharge: HOME | End: 2024-10-15

## 2024-10-15 LAB
RAD ONC ARIA COURSE ID: NORMAL
RAD ONC ARIA COURSE INTENT: NORMAL
RAD ONC ARIA COURSE LAST TREATMENT DATE: NORMAL
RAD ONC ARIA COURSE START DATE: NORMAL
RAD ONC ARIA COURSE TREATMENT ELAPSED DAYS: 1
RAD ONC ARIA FIRST TREATMENT DATE: NORMAL
RAD ONC ARIA PLAN FRACTIONS TREATED TO DATE: 2
RAD ONC ARIA PLAN ID: NORMAL
RAD ONC ARIA PLAN PRESCRIBED DOSE PER FRACTION: 2 GY
RAD ONC ARIA PLAN PRIMARY REFERENCE POINT: NORMAL
RAD ONC ARIA PLAN TOTAL FRACTIONS PRESCRIBED: 30
RAD ONC ARIA PLAN TOTAL PRESCRIBED DOSE: 6000 CGY
RAD ONC ARIA REFERENCE POINT DOSAGE GIVEN TO DATE: 4 GY
RAD ONC ARIA REFERENCE POINT ID: NORMAL
RAD ONC ARIA REFERENCE POINT SESSION DOSAGE GIVEN: 2 GY

## 2024-10-15 PROCEDURE — 77014 CHG CT GUIDANCE RADIATION THERAPY FLDS PLACEMENT: CPT | Performed by: INTERNAL MEDICINE

## 2024-10-15 PROCEDURE — 77386: CPT | Performed by: INTERNAL MEDICINE

## 2024-10-16 ENCOUNTER — HOSPITAL ENCOUNTER (OUTPATIENT)
Dept: RADIATION ONCOLOGY | Facility: HOSPITAL | Age: 64
Discharge: HOME | End: 2024-10-16

## 2024-10-16 ENCOUNTER — RADIATION ONCOLOGY WEEKLY ASSESSMENT (OUTPATIENT)
Dept: RADIATION ONCOLOGY | Facility: HOSPITAL | Age: 64
End: 2024-10-16
Payer: COMMERCIAL

## 2024-10-16 VITALS
OXYGEN SATURATION: 99 % | BODY MASS INDEX: 25.73 KG/M2 | RESPIRATION RATE: 18 BRPM | TEMPERATURE: 97.7 F | SYSTOLIC BLOOD PRESSURE: 151 MMHG | DIASTOLIC BLOOD PRESSURE: 70 MMHG | HEIGHT: 68 IN | HEART RATE: 60 BPM | WEIGHT: 169.8 LBS

## 2024-10-16 DIAGNOSIS — C77.1 NON-SMALL CELL LUNG CANCER METASTATIC TO INTRATHORACIC LYMPH NODE: ICD-10-CM

## 2024-10-16 DIAGNOSIS — C34.90 NON-SMALL CELL LUNG CANCER METASTATIC TO INTRATHORACIC LYMPH NODE: ICD-10-CM

## 2024-10-16 DIAGNOSIS — C34.11 PRIMARY ADENOCARCINOMA OF UPPER LOBE OF RIGHT LUNG: Primary | ICD-10-CM

## 2024-10-16 LAB
RAD ONC ARIA COURSE ID: NORMAL
RAD ONC ARIA COURSE INTENT: NORMAL
RAD ONC ARIA COURSE LAST TREATMENT DATE: NORMAL
RAD ONC ARIA COURSE START DATE: NORMAL
RAD ONC ARIA COURSE TREATMENT ELAPSED DAYS: 2
RAD ONC ARIA FIRST TREATMENT DATE: NORMAL
RAD ONC ARIA PLAN FRACTIONS TREATED TO DATE: 3
RAD ONC ARIA PLAN ID: NORMAL
RAD ONC ARIA PLAN PRESCRIBED DOSE PER FRACTION: 2 GY
RAD ONC ARIA PLAN PRIMARY REFERENCE POINT: NORMAL
RAD ONC ARIA PLAN TOTAL FRACTIONS PRESCRIBED: 30
RAD ONC ARIA PLAN TOTAL PRESCRIBED DOSE: 6000 CGY
RAD ONC ARIA REFERENCE POINT DOSAGE GIVEN TO DATE: 6 GY
RAD ONC ARIA REFERENCE POINT ID: NORMAL
RAD ONC ARIA REFERENCE POINT SESSION DOSAGE GIVEN: 2 GY

## 2024-10-16 PROCEDURE — 77386: CPT | Performed by: INTERNAL MEDICINE

## 2024-10-16 PROCEDURE — 77014 CHG CT GUIDANCE RADIATION THERAPY FLDS PLACEMENT: CPT | Performed by: INTERNAL MEDICINE

## 2024-10-16 RX ORDER — OXYCODONE HCL 5 MG/5 ML
5 SOLUTION, ORAL ORAL EVERY 4 HOURS PRN
Qty: 200 ML | Refills: 0 | Status: SHIPPED | OUTPATIENT
Start: 2024-10-16

## 2024-10-16 NOTE — PROGRESS NOTES
HEMATOLOGY ONCOLOGY OUTPATIENT FOLLOW UP       Patient name: Laura Higginbotham  : 1960  MRN: [id number]  Primary Care Physician: Laura Archer  Referring Physician: Laura Archer  Reason For Consult: Primary lung cancer, right    History of Present Illness:  Patient is a 64 y.o. PMH of smoking, skin cancer, arthritis, GERD who presents for newly diagnosed lung cancer.    -2024 Pt went to Dr Archer for increased nonproductive coughing and feeling more dysnpeic on exertion. Was dx with pleurisy and given prednisone which helped somewhat.  -2024 Since cough hadn't completely gone away in 6 weeks, patient went back to Dr. Archer who became concerned and ordered a CXR and antibiotics.  -CXR showed a right hilar mass, CT was ordered.    -2024 CT chest at [facility]: Revealed a very large right hilar mass measuring 9.2 x 9.5 cm in diameter with hypodensity centrally suggesting a necrotic center.  There is obstruction of the right upper lobe bronchus with the infiltrate seen extending peripheral to that area and marked elevation of the right hilum.  Mass extends into the right hilar lymph nodes.    Patient was seen in consultation by Dr. Crystal Powell, pulmonology. She denied having a cough, hemoptysis, wheezing, dyspnea, fever, unintentional weight loss.    -2020 for EBUS bronchoscopy by Dr. Crystal Powell with FNA to the right hilar node 10R and to the right hilar mass revealing inés revealing malignant cells favoring non-small cell.    Pathology showed in the right hilar mass IHC positive for CK7, TTF-1, and Napsin, negative for p63 and CK/6 consistent with pulmonary adenocarcinoma negative for CD56, chromogranin, synaptophysin excluding neuroendocrine component.  Per discussion with pathology: Passes from the right hilar mass were found to be acellular, viable tissue was from the lymph node biopsied and 1 cassette is available for future testing.  -Pneumocystis was negative,  respiratory culture  showing rare growth of normal respiratory alexi with no S. aureus or Pseudomonas aeruginosa detected.,  Respiratory panel PCR negative, PRELIM: no acid-fast bacilli seen on concentrated smear at 1 week, No isolated fungus at 1 week,.  -8/30/2024 CBC showed WBC 11.53, hemoglobin 13.1, hematocrit 40.7 with essentially normal indices, platelets 332K    -9/12/2024 Patient presented for initial consultation with her niece reporting constant nonproductive cough, she still denies having hemoptysis, wheezing, dyspnea, fever, or unintentional weight loss, new bony pains, no new H/A or focal neuro deficit except right under arm new tingling/burning.  She has a history of smoking 1 PPD- started at 17, this summer cut down to 1/2 PPD mostly because of the coughing (47+ years).    -9/20/2024 MRI brain with without contrast with no acute intracranial process identified, findings suggestive of minimal chronic small vessel ischemic disease but no evidence of intracranial metastasis    -9/23/2025 Caris shows PD-L1 positive with TPS 80% by PDL1 test 22C3, with level 1 evidence for cemiplimab and pembrolizumab, positive by PD-L1 28-8 with level 1 evidence for nivolumab/ipilimumab combination, positive by PD-L1  with TC 1+, 50% with atezolizumab in the metastatic setting level 1 evidence, and PD-L1 positive and  positive TC 1+60% with a benefit of atezolizumab in the adjuvant setting, and cemiplimab level 1 evidence.  -ALK negative/fusion not detected by RNA in the tumor, BRAF mutation not detected, EGFR mutation not detected, MET variant transcript not detected, RET fusion not detected, ROS1 fusion not detected.  Tumor mutation burden was high at 18, microsatellite stable.  KRAS pG12V found and 61%, TP53 wI261Z found in 56%, NFKBIA was amplified    -9/25/2024 NM PET/CT skull initial staging: Heterogeneous right upper lobe lung mass extending into the superior right lower lobe, encasing the right mainstem bronchus, encasing  and narrowing the right upper lobe bronchus, is hypermetabolic (mSUV 8.76), consistent with malignancy. It has heterogeneous areas of photopenia, suggesting internal necrosis. Pre-carinal lymph node measuring 14 mm short axis is FDG avid (mSUV 4.0), consistent with malignancy.  No hybrid avidity in the neck, abdomen, pelvis, or skeleton. No left hilar adenopathy. No suspicious left lung nodules. Normal heart size with coronary calcifications. Benign calcified mediastinal and left hilar lymph nodes are present.    -10/2/2024 Patient presents in follow up with her niece reporting better nonproductive cough and rib pain since being oxycodone low dose, she still denies having hemoptysis, wheezing, dyspnea, fever, or unintentional weight loss, new bony pains, no new H/A or focal neuro deficit except right under arm new tingling/burning. Got SIMMED yesterday for radiation.  She has a history of smoking 1 PPD- started at 17, still on 1/2 PPD - gum stuck to dentures.    -10/3/2024 B12 1000 mcg given IM  -10/10 s/p Mediport placement    -10/14/2024 Patient presents in follow up ready to start chemoradiation reporting still with nonproductive cough and rib pain since being oxycodone low dose, she still denies having hemoptysis, wheezing, dyspnea, fever, or unintentional weight loss, new bony pains, no new H/A or focal neuro deficit except right under arm new tingling/burning.  -Picked up her nausea medications, got her port,  her Emla cream (ports still a little tender), started her folate soon as she got it.  Did get her chemo teach and got her B12 shot as well she is ready to go.      Subjective:  -11/4/2024 Patient presents in follow up for C2 chemoradiation reporting nonproductive cough and rib pain both better since being oxycodone low dose, using stool softener and yogurt which has greatly helped with stooling appropriately (EOD), patient had a lot of nausea the first 9 days, needing the zofran Q8H that time, a  lot of fatigue.    Past Medical History:   Diagnosis Date    Arthritis     Cancer     skin cancer on leg right    GERD (gastroesophageal reflux disease)      Past Surgical History:   Procedure Laterality Date    BRONCHOSCOPY N/A 2024    Procedure: BRONCHOSCOPY WITH BRONCHIAL WASHING ,ENDOBRONCHIAL ULTRASOUND WITH FINE NEEDLE ASPIRATION X2 AREAS;  Surgeon: Crystal Powell MD;  Location: [facility] ENDOSCOPY;  Service: Pulmonary;  Laterality: N/A;     SECTION      x3    KNEE SURGERY Left     ORIF, screws and plates, has had Operation on it x 3       Current Outpatient Medications:     Breztri Aerosphere 160-9-4.8 MCG/ACT aerosol inhaler, Inhale 2 puffs 2 (Two) Times a Day., Disp: , Rfl:     dexAMETHasone (DECADRON) 4 MG tablet, Take 1 tablet twice a day for 3 consecutive days beginning day before chemo, Disp: 24 tablet, Rfl: 2    famotidine (PEPCID) 20 MG tablet, Take 1 tablet by mouth 2 (Two) Times a Day., Disp: 30 tablet, Rfl: 2    folic acid (Folate) 400 MCG tablet, Take 1 tablet by mouth Daily for 270 days., Disp: 90 tablet, Rfl: 2    gabapentin (NEURONTIN) 100 MG capsule, Take 1 capsule by mouth 2 (Two) Times a Day., Disp: , Rfl:     Lidocaine Viscous HCl (XYLOCAINE) 2 % solution, Take 5 mL by mouth 4 (Four) Times a Day Before Meals & at Bedtime., Disp: 100 mL, Rfl: 3    lidocaine-prilocaine (EMLA) 2.5-2.5 % cream, Apply 1 Application topically to the appropriate area as directed Every 2 (Two) Hours As Needed for Mild Pain. To port site before chemo/blood draw, Disp: 30 g, Rfl: 2    metFORMIN ER (GLUCOPHAGE-XR) 500 MG 24 hr tablet, TAKE 2 TABLETS BY MOUTH DAILY WITH EVENING MEAL, Disp: , Rfl:     NP Thyroid 30 MG tablet, TAKE 1 TABLET BY MOUTH EVERY DAY ON EMPTY STOMACH FOR 30 DAYS, Disp: , Rfl:     Nystatin (magic mouthwash), Swish and swallow 5 mL 4 (Four) Times a Day Before Meals & at Bedtime., Disp: 1 bottle, Rfl: 2    OLANZapine (zyPREXA) 5 MG tablet, Take one tablet every night for four nights starting  the night of chemotherapy, Disp: 12 tablet, Rfl: 1    ondansetron ODT (ZOFRAN-ODT) 8 MG disintegrating tablet, Take 1 tablet by mouth Every 8 (Eight) Hours As Needed for Nausea or Vomiting., Disp: 45 tablet, Rfl: 3    oxyCODONE (ROXICODONE) 5 MG/5ML solution, Take 5 mL by mouth Every 4 (Four) Hours As Needed for Moderate Pain., Disp: 200 mL, Rfl: 0    albuterol sulfate  (90 Base) MCG/ACT inhaler, Inhale 1 puff Every 4 (Four) Hours As Needed for Wheezing. (Patient not taking: Reported on 10/3/2024), Disp: , Rfl:     benzonatate (TESSALON) 200 MG capsule, Take 100 mg by mouth 3 (Three) Times a Day As Needed for Cough. (Patient not taking: Reported on 9/12/2024), Disp: , Rfl:     escitalopram (LEXAPRO) 10 MG tablet, Take 1 tablet by mouth Daily. (Patient not taking: Reported on 10/2/2024), Disp: , Rfl:     LORazepam (ATIVAN) 1 MG tablet, Take 1 tablet by mouth Every 8 (Eight) Hours As Needed for Anxiety. (Patient not taking: Reported on 10/2/2024), Disp: , Rfl:     metFORMIN ER (GLUCOPHAGE-XR) 750 MG 24 hr tablet, Take 1 tablet by mouth Daily With Dinner. (Patient not taking: Reported on 11/4/2024), Disp: , Rfl:     nicotine polacrilex (GoodSense Nicotine) 4 MG gum, Chew 1 each As Needed for Smoking Cessation. (Patient not taking: Reported on 10/2/2024), Disp: 120 each, Rfl: 4    predniSONE (DELTASONE) 10 MG tablet, Take 1 tablet by mouth 2 (Two) Times a Day. X 30 days (Patient not taking: Reported on 10/3/2024), Disp: , Rfl:   No current facility-administered medications for this visit.    Facility-Administered Medications Ordered in Other Visits:     heparin injection 500 Units, 500 Units, Intravenous, PRN, Veronica Huynh MD PhD    sodium chloride 0.9 % flush 10 mL, 10 mL, Intravenous, PRN, Veronica Huynh MD PhD    No Known Allergies    Family History   Problem Relation Age of Onset    Lung cancer Brother         Lung cancer/Leukemia    Cancer Brother      Cancer-related family history  includes Cancer in her brother; Lung cancer in her brother.    Social History     Tobacco Use    Smoking status: Every Day     Current packs/day: 0.50     Average packs/day: 0.5 packs/day for 47.8 years (23.9 ttl pk-yrs)     Types: Cigarettes     Start date: 1977    Smokeless tobacco: Never   Vaping Use    Vaping status: Never Used   Substance Use Topics    Alcohol use: Not Currently    Drug use: Never     Social History     Social History Narrative    Not on file      Review of Systems:  Constitutional: +fatigue, Denies any weakness, lack of appetite, excessive appetite, weight change, chills or fever.  Eyes: Reports no blurry vision, no double vision, no pain in the eyes, dry eyes or tearing.  ENT: Reports no decreased hearing capacity, ear pain or tinnitus. Denies any epistaxis, nasal congestion, mouth sores or bleeding, tooth or jaw pain, sore throat or hoarseness.  Respiratory: + chronic cough (better with oxy)  Denies any sputum production or hemoptysis. There is no wheezing, shortness of breath or any other respiratory complaints.  Cardiovascular: + shortness of breath with activity, Denies any chest pain, shortness of breath when lying down, palpitations, or leg swelling.  Breasts: Denies any lumps, bumps, pain, nipple changes or discharge in the breasts.  Gastrointestinal: +painful swallowing, nausea, vomiting, constipation (better per HPI) There are no complaints of abdominal pain, difficulty swallowing or anorexia, diarrhea,  heartburn, blood in the stools, dark stools, or change in bowel habits or hemorrhoids.  Genitourinary: Denies any pain or burning on urination, blood in the urine or frequent urination.   Musculoskeletal: Denies painful joints, no swelling of the joints, muscle or back pain. Denies any pain or tingling in the legs, hands or feet.  Neuropsychiatric: Denies any nervousness, depressed mood, headaches, blackouts, dizziness, weakness of limbs, loss of sensation, loss of balance, loss of  "coordination or difficulty in speaking.  Cutaneous: Denies any dry skin, itching, rash, change in the color of the skin, or any other cutaneous complaints.  Hematologic/Lymphatic: Denies any bruising or bleeding. Report no recent development of palpable or painful lymph nodes.  Allergy/Immunology: Denies rash/hayfever symptoms or any recent history of pneumonia, recurrent sinusitis, urinary infection or any other history of an ongoing infection.  Endocrine: Reports no intolerance to heat or cold, excessive thirst or excessive urination.    Objective:  Vital signs:  Vitals:    24 0751   BP: 123/79   Pulse: 78   Temp: 98.1 °F (36.7 °C)   TempSrc: Oral   SpO2: 100%   Weight: 74.3 kg (163 lb 12.8 oz)   Height: 172.7 cm (67.99\")   PainSc: 0-No pain     Body mass index is 24.91 kg/m².      Physical Exam:   ECO  GENERAL: The patient is a pleasant middle aged white female who appears their stated age and is awake, in no acute distress, alert and oriented x3.  SKIN: No rash or ulcers.  HEME/LYMPHATICS: No bruising or petechiae on visual inspection. No lymphadenopathy on visual inspection and palpation of cervical, supraclavicular, infraclavicular lymph nodes.  HEAD/FACE: atraumatic and normocephalic. Normal hair.  EYES: PERRLA/EOMI. No scleral icterus. No tearing or dry eyes.  ENT: External ears normal with no evidence of discharge. No evidence of ulceration or bleeding in the nostrils. Mouth has no ulcers, bleeding or inflammation of the gums, floor or roof of the mouth with normal dentition.  NECK: Supple, symmetric.   CHEST/RESPIRATORY: Expansion maintained bilaterally and symmetrically. On auscultation, clear breath sounds in left lung, decreased breath sounds in upper right lung field but better than prior. No wheezes, rhonchi or rales.  BREAST: Deferred.  CARDIOVASCULAR: On auscultation, regular rate and rhythm. No murmurs, gallops or rubs are heard.  GASTROINTESTINAL/ABDOMEN: Symmetric. On auscultation of " the abdomen, there are normal bowel sounds in all four quadrants. There are no surgical scars in the abdomen. Nontender to palpation.  GENITOURINARY: Deferred.  NEUROLOGIC: Alert and oriented time, person, and place, with no apparent changes in recent or remote memory. Cranial nerves II-XII are grossly intact. Sensation is maintained in the extremities. Strength and tone appear normal for age and equal in the extremities. Gait is normal.  MUSCULOSKELETAL: No cyanosis, clubbing or edema in the extremities. There are no surgical scars on the extremities.  PSYCHIATRIC: The patient maintains judgment and has good insight. The patient has no changes in mood or affection.  IV: right chest Mediport accessed, NTTP, no erythema, edema or pus.    Lab Results - Last 18 Months   Lab Units 11/04/24  0747 10/14/24  0748 10/10/24  0810   WBC 10*3/mm3 8.85 12.12* 8.77   HEMOGLOBIN g/dL 11.5* 12.3 11.8*   HEMATOCRIT % 35.1 38.3 37.0   PLATELETS 10*3/mm3 400 477* 391   MCV fL 85.2 85.5 84.3     Lab Results - Last 18 Months   Lab Units 10/14/24  0752 09/12/24  1151   SODIUM mmol/L  --  134*   POTASSIUM mmol/L  --  4.3   CHLORIDE mmol/L  --  98   CO2 mmol/L  --  25.5   BUN mg/dL  --  8   CREATININE mg/dL 0.80 0.73   CALCIUM mg/dL  --  9.6   BILIRUBIN mg/dL  --  0.2   ALK PHOS U/L  --  104   ALT (SGPT) U/L  --  12   AST (SGOT) U/L  --  12   GLUCOSE mg/dL  --  251*     Lab Results   Component Value Date    GLUCOSE 251 (H) 09/12/2024    BUN 8 09/12/2024    CREATININE 0.80 10/14/2024    BCR 11.0 09/12/2024    K 4.3 09/12/2024    CO2 25.5 09/12/2024    CALCIUM 9.6 09/12/2024    ALBUMIN 3.9 09/12/2024    AST 12 09/12/2024    ALT 12 09/12/2024     Lab Results - Last 18 Months   Lab Units 10/10/24  0810 08/26/24  0945   INR  <0.93* 0.93   APTT seconds 30.8 27.1       Lab Results   Component Value Date    PTT 30.8 10/10/2024    INR <0.93 (L) 10/10/2024       Assessment & Plan     Right hilar mass/right upper lobe adenocarcinoma, at minimum T4  N2 (IIIB)     Discussed the above results which include imaging and pathology with the patient.  -Staging PET/CT no metastatic disease but confirmed IIIB disease, nonsurgical candidate-previously discussed this in detail with the patient and her family and explained that the standard of care at this point would be chemoradiation with curative intent.  Discussed the couple chemotherapy regimens that can be used for radiation.  -Staging brain MRI negative for metastasis    -Radiology Oncology consult-pt recieving concurrent chemoradiation   -PFTs from pulmonologist done per patient- per patient 50-60%? Sees him December.    -Biomarker testing +PD-L1 testing (category 1), +KRAS G12C, negative EGFR mutation including exon 19 del, L858R and other deletions, exon 20 insertions, (category 1), ALK fusions (category 1), ROS1 fusions, BRAF V600E, NTRK 1/2/3 fusions, MET exon 14 skipping or amplifications, RET fusions, ErbB2 (HER2) alterations,    PLAN: cisplatin plus pemetrexed Q21 days X 3 cycles with concurrent radiation with repeat imaging approx 30 days after, followed by durvalumab 1500 mg Q4 weeks x 12 cycles (category 1 for stage III)     - s/p B12 shot one week prior to chemo (will then be next due C3),  - folic acid 1 mg daily and informed pt to start taking immediately     -got chemoteach (can get first B12 injection at that time)  -10/10 s/p Mediport placement with EMLA cream available   -Will do CBC, CMP, mag while on treatment    Physical exam and labs within parameters, proceed with cycle 2 chemotherapy return to clinic in 3 weeks **(Moved to [place] after Thanksgiving) with labs for C3 chemotherapy    -*Data has shown a Palliative consult to help manage symptoms early on in care for advanced stage lung cancer patients.      2. chemotherapy associated nausea/vomiting  -preventative palonosetron 0.25 mg IV, fosaprepitant 150 mg IV, day 2 PO dexamethasone 4 mg twice daily T- 1, T0, T+ 1 take as directed  -PRN Zyprexa,  and as needed Zofran (using Q8H d 2-11)- added compazine; if compazine working better than zyprexa, stop zyprexa and just use compazine and zofran      3. Tobacco dependence  -tried nicotine gum but stuck to dentures, is already cutting back on her own; is down to 1/2 ppd (but only 1/2 a cigarette when she smokes)    4.  Hyperglycemia, noted after start of treatment to 300s  -Patient started on 1000 mg metformin daily by PCP, BS still over 300 today, patient had eaten.  -Continue to monitor    Thank you very much for providing the opportunity to participate in this patient’s care. Please do not hesitate to call if there are any other questions.

## 2024-10-16 NOTE — PROGRESS NOTES
[facility] RADIATION ONCOLOGY  ON-TREATMENT VISIT NOTE    NAME: Laura Higginbotham  YOB: 1960  MRN #: [id number]  DATE OF SERVICE: 10/16/2024  PRESCRIBING PHYSICIAN: Ivan Dasilva MD    DIAGNOSIS:      ICD-10-CM ICD-9-CM   1. Primary adenocarcinoma of upper lobe of right lung  C34.11 162.3   2. 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  C34.90 162.9    C77.1 196.1      RADIATION THERAPY VISIT:  Continue radiation therapy, Dosimetry plan remains acceptable, Films reviewed and remains acceptable, Pain assessed, Pain management planned, Radiation dose schedule reviewed and remains acceptable, Radiation technique remains acceptable, and Symptoms within expected range    Radiation Treatments       Active   Plans   RUL   Most recent treatment: Dose planned: 200 cGy (fraction 3 on 10/16/2024)   Total: Dose planned: 6,000 cGy (30 fractions)   Elapsed Days: 2      Reference Points   RUL   Most recent treatment: Dose given: 200 cGy (on 10/16/2024)   Total: Dose given: 600 cGy   Elapsed Days: 2                    [x] Concurrent Chemo   Regimen:  Pemetrexed/ Cisplatin     PHYSICAL ASSESSMENT         Vitals:    10/16/24 1438   BP: 151/70   Pulse: 60   Resp: 18   Temp: 97.7 °F (36.5 °C)   SpO2: 99%      Wt Readings from Last 3 Encounters:   10/16/24 77 kg (169 lb 12.8 oz)   10/14/24 76.5 kg (168 lb 9.6 oz)   10/10/24 79.8 kg (176 lb)     Restricted in physically strenuous activity but ambulatory and able to carry out work of a light or sedentary nature, e.g., light house work, office work = 1    We examined the relevant areas: yes  Findings are within the expected range for this stage of treatment: yes    ACTION ITEMS     Patient tolerating treatment well and as expected for this stage in their treatment and Continue radiation therapy as planned    Estimated Completion Date: 11/25/2024    Anticipatory guidance provided    Refilled prescription for liquid oxycodone to help reduce  coughing symptoms      Ivan Dasilva MD  Radiation Oncology  [facility]

## 2024-10-17 ENCOUNTER — HOSPITAL ENCOUNTER (OUTPATIENT)
Dept: RADIATION ONCOLOGY | Facility: HOSPITAL | Age: 64
Discharge: HOME | End: 2024-10-17

## 2024-10-17 LAB
RAD ONC ARIA COURSE ID: NORMAL
RAD ONC ARIA COURSE INTENT: NORMAL
RAD ONC ARIA COURSE LAST TREATMENT DATE: NORMAL
RAD ONC ARIA COURSE START DATE: NORMAL
RAD ONC ARIA COURSE TREATMENT ELAPSED DAYS: 3
RAD ONC ARIA FIRST TREATMENT DATE: NORMAL
RAD ONC ARIA PLAN FRACTIONS TREATED TO DATE: 4
RAD ONC ARIA PLAN ID: NORMAL
RAD ONC ARIA PLAN PRESCRIBED DOSE PER FRACTION: 2 GY
RAD ONC ARIA PLAN PRIMARY REFERENCE POINT: NORMAL
RAD ONC ARIA PLAN TOTAL FRACTIONS PRESCRIBED: 30
RAD ONC ARIA PLAN TOTAL PRESCRIBED DOSE: 6000 CGY
RAD ONC ARIA REFERENCE POINT DOSAGE GIVEN TO DATE: 8 GY
RAD ONC ARIA REFERENCE POINT ID: NORMAL
RAD ONC ARIA REFERENCE POINT SESSION DOSAGE GIVEN: 2 GY

## 2024-10-17 PROCEDURE — 77386: CPT | Performed by: INTERNAL MEDICINE

## 2024-10-17 PROCEDURE — 77336 RADIATION PHYSICS CONSULT: CPT | Performed by: INTERNAL MEDICINE

## 2024-10-17 PROCEDURE — 77014 CHG CT GUIDANCE RADIATION THERAPY FLDS PLACEMENT: CPT | Performed by: INTERNAL MEDICINE

## 2024-10-18 ENCOUNTER — HOSPITAL ENCOUNTER (OUTPATIENT)
Dept: RADIATION ONCOLOGY | Facility: HOSPITAL | Age: 64
Discharge: HOME | End: 2024-10-18

## 2024-10-18 LAB
RAD ONC ARIA COURSE ID: NORMAL
RAD ONC ARIA COURSE INTENT: NORMAL
RAD ONC ARIA COURSE LAST TREATMENT DATE: NORMAL
RAD ONC ARIA COURSE START DATE: NORMAL
RAD ONC ARIA COURSE TREATMENT ELAPSED DAYS: 4
RAD ONC ARIA FIRST TREATMENT DATE: NORMAL
RAD ONC ARIA PLAN FRACTIONS TREATED TO DATE: 5
RAD ONC ARIA PLAN ID: NORMAL
RAD ONC ARIA PLAN PRESCRIBED DOSE PER FRACTION: 2 GY
RAD ONC ARIA PLAN PRIMARY REFERENCE POINT: NORMAL
RAD ONC ARIA PLAN TOTAL FRACTIONS PRESCRIBED: 30
RAD ONC ARIA PLAN TOTAL PRESCRIBED DOSE: 6000 CGY
RAD ONC ARIA REFERENCE POINT DOSAGE GIVEN TO DATE: 10 GY
RAD ONC ARIA REFERENCE POINT ID: NORMAL
RAD ONC ARIA REFERENCE POINT SESSION DOSAGE GIVEN: 2 GY

## 2024-10-18 PROCEDURE — 77014 CHG CT GUIDANCE RADIATION THERAPY FLDS PLACEMENT: CPT | Performed by: INTERNAL MEDICINE

## 2024-10-18 PROCEDURE — 77386: CPT | Performed by: INTERNAL MEDICINE

## 2024-10-21 ENCOUNTER — RADIATION ONCOLOGY WEEKLY ASSESSMENT (OUTPATIENT)
Dept: RADIATION ONCOLOGY | Facility: HOSPITAL | Age: 64
End: 2024-10-21
Payer: COMMERCIAL

## 2024-10-21 ENCOUNTER — HOSPITAL ENCOUNTER (OUTPATIENT)
Dept: RADIATION ONCOLOGY | Facility: HOSPITAL | Age: 64
Setting detail: RADIATION/ONCOLOGY SERIES
Discharge: HOME | End: 2024-10-21
Payer: COMMERCIAL

## 2024-10-21 VITALS
OXYGEN SATURATION: 99 % | HEIGHT: 68 IN | TEMPERATURE: 97 F | HEART RATE: 86 BPM | SYSTOLIC BLOOD PRESSURE: 130 MMHG | RESPIRATION RATE: 18 BRPM | BODY MASS INDEX: 24.77 KG/M2 | WEIGHT: 163.4 LBS | DIASTOLIC BLOOD PRESSURE: 77 MMHG

## 2024-10-21 DIAGNOSIS — C34.11 PRIMARY ADENOCARCINOMA OF UPPER LOBE OF RIGHT LUNG: Primary | ICD-10-CM

## 2024-10-21 DIAGNOSIS — C34.90 NON-SMALL CELL LUNG CANCER METASTATIC TO INTRATHORACIC LYMPH NODE: ICD-10-CM

## 2024-10-21 DIAGNOSIS — C77.1 NON-SMALL CELL LUNG CANCER METASTATIC TO INTRATHORACIC LYMPH NODE: ICD-10-CM

## 2024-10-21 LAB
RAD ONC ARIA COURSE ID: NORMAL
RAD ONC ARIA COURSE INTENT: NORMAL
RAD ONC ARIA COURSE LAST TREATMENT DATE: NORMAL
RAD ONC ARIA COURSE START DATE: NORMAL
RAD ONC ARIA COURSE TREATMENT ELAPSED DAYS: 7
RAD ONC ARIA FIRST TREATMENT DATE: NORMAL
RAD ONC ARIA PLAN FRACTIONS TREATED TO DATE: 6
RAD ONC ARIA PLAN ID: NORMAL
RAD ONC ARIA PLAN PRESCRIBED DOSE PER FRACTION: 2 GY
RAD ONC ARIA PLAN PRIMARY REFERENCE POINT: NORMAL
RAD ONC ARIA PLAN TOTAL FRACTIONS PRESCRIBED: 30
RAD ONC ARIA PLAN TOTAL PRESCRIBED DOSE: 6000 CGY
RAD ONC ARIA REFERENCE POINT DOSAGE GIVEN TO DATE: 12 GY
RAD ONC ARIA REFERENCE POINT ID: NORMAL
RAD ONC ARIA REFERENCE POINT SESSION DOSAGE GIVEN: 2 GY

## 2024-10-21 PROCEDURE — 77014 CHG CT GUIDANCE RADIATION THERAPY FLDS PLACEMENT: CPT | Performed by: INTERNAL MEDICINE

## 2024-10-21 PROCEDURE — 77427 RADIATION TX MANAGEMENT X5: CPT | Performed by: INTERNAL MEDICINE

## 2024-10-21 PROCEDURE — 77386: CPT | Performed by: INTERNAL MEDICINE

## 2024-10-21 NOTE — PROGRESS NOTES
[facility] RADIATION ONCOLOGY  ON-TREATMENT VISIT NOTE    NAME: Laura Higginbotham  YOB: 1960  MRN #: [id number]  DATE OF SERVICE: 10/21/2024  PRESCRIBING PHYSICIAN: Ivan Dasilva MD    DIAGNOSIS:      ICD-10-CM ICD-9-CM   1. Primary adenocarcinoma of upper lobe of right lung  C34.11 162.3   2. 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  C34.90 162.9    C77.1 196.1      RADIATION THERAPY VISIT:  Continue radiation therapy, Dosimetry plan remains acceptable, Films reviewed and remains acceptable, Pain assessed, Pain management planned, Radiation dose schedule reviewed and remains acceptable, Radiation technique remains acceptable, and Symptoms within expected range    Radiation Treatments       Active   Plans   RUL   Most recent treatment: Dose planned: 200 cGy (fraction 6 on 10/21/2024)   Total: Dose planned: 6,000 cGy (30 fractions)   Elapsed Days: 7      Reference Points   RUL   Most recent treatment: Dose given: 200 cGy (on 10/21/2024)   Total: Dose given: 1,200 cGy   Elapsed Days: 7                    [x] Concurrent Chemo   Regimen:  Pemetrexed/ Cisplatin     PHYSICAL ASSESSMENT         Vitals:    10/21/24 0941   BP: 130/77   Pulse: 86   Resp: 18   Temp: 97 °F (36.1 °C)   SpO2: 99%      Wt Readings from Last 3 Encounters:   10/21/24 74.1 kg (163 lb 6.4 oz)   10/16/24 77 kg (169 lb 12.8 oz)   10/14/24 76.5 kg (168 lb 9.6 oz)     Restricted in physically strenuous activity but ambulatory and able to carry out work of a light or sedentary nature, e.g., light house work, office work = 1    We examined the relevant areas: yes  Findings are within the expected range for this stage of treatment: yes    ACTION ITEMS     Patient tolerating treatment well and as expected for this stage in their treatment and Continue radiation therapy as planned    Estimated Completion Date: 11/25/2024    Anticipatory guidance provided    Refilled prescription for liquid oxycodone to help  reduce coughing symptoms.    Patient experiencing fatigue.  Will continue to monitor.    Patient has noticed recent weight loss.  Encouraged appropriate nutrition.  Will monitor closely.      Ivan Dasilva MD  Radiation Oncology  [facility]

## 2024-10-22 ENCOUNTER — HOSPITAL ENCOUNTER (OUTPATIENT)
Dept: RADIATION ONCOLOGY | Facility: HOSPITAL | Age: 64
Discharge: HOME | End: 2024-10-22

## 2024-10-22 LAB
RAD ONC ARIA COURSE ID: NORMAL
RAD ONC ARIA COURSE INTENT: NORMAL
RAD ONC ARIA COURSE LAST TREATMENT DATE: NORMAL
RAD ONC ARIA COURSE START DATE: NORMAL
RAD ONC ARIA COURSE TREATMENT ELAPSED DAYS: 8
RAD ONC ARIA FIRST TREATMENT DATE: NORMAL
RAD ONC ARIA PLAN FRACTIONS TREATED TO DATE: 7
RAD ONC ARIA PLAN ID: NORMAL
RAD ONC ARIA PLAN PRESCRIBED DOSE PER FRACTION: 2 GY
RAD ONC ARIA PLAN PRIMARY REFERENCE POINT: NORMAL
RAD ONC ARIA PLAN TOTAL FRACTIONS PRESCRIBED: 30
RAD ONC ARIA PLAN TOTAL PRESCRIBED DOSE: 6000 CGY
RAD ONC ARIA REFERENCE POINT DOSAGE GIVEN TO DATE: 14 GY
RAD ONC ARIA REFERENCE POINT ID: NORMAL
RAD ONC ARIA REFERENCE POINT SESSION DOSAGE GIVEN: 2 GY

## 2024-10-22 PROCEDURE — 77014 CHG CT GUIDANCE RADIATION THERAPY FLDS PLACEMENT: CPT | Performed by: INTERNAL MEDICINE

## 2024-10-22 PROCEDURE — 77386: CPT | Performed by: INTERNAL MEDICINE

## 2024-10-23 ENCOUNTER — HOSPITAL ENCOUNTER (OUTPATIENT)
Dept: RADIATION ONCOLOGY | Facility: HOSPITAL | Age: 64
Discharge: HOME | End: 2024-10-23

## 2024-10-23 LAB
RAD ONC ARIA COURSE ID: NORMAL
RAD ONC ARIA COURSE INTENT: NORMAL
RAD ONC ARIA COURSE LAST TREATMENT DATE: NORMAL
RAD ONC ARIA COURSE START DATE: NORMAL
RAD ONC ARIA COURSE TREATMENT ELAPSED DAYS: 9
RAD ONC ARIA FIRST TREATMENT DATE: NORMAL
RAD ONC ARIA PLAN FRACTIONS TREATED TO DATE: 8
RAD ONC ARIA PLAN ID: NORMAL
RAD ONC ARIA PLAN PRESCRIBED DOSE PER FRACTION: 2 GY
RAD ONC ARIA PLAN PRIMARY REFERENCE POINT: NORMAL
RAD ONC ARIA PLAN TOTAL FRACTIONS PRESCRIBED: 30
RAD ONC ARIA PLAN TOTAL PRESCRIBED DOSE: 6000 CGY
RAD ONC ARIA REFERENCE POINT DOSAGE GIVEN TO DATE: 16 GY
RAD ONC ARIA REFERENCE POINT ID: NORMAL
RAD ONC ARIA REFERENCE POINT SESSION DOSAGE GIVEN: 2 GY

## 2024-10-23 PROCEDURE — 77014 CHG CT GUIDANCE RADIATION THERAPY FLDS PLACEMENT: CPT | Performed by: INTERNAL MEDICINE

## 2024-10-23 PROCEDURE — 77386: CPT | Performed by: INTERNAL MEDICINE

## 2024-10-24 ENCOUNTER — HOSPITAL ENCOUNTER (OUTPATIENT)
Dept: RADIATION ONCOLOGY | Facility: HOSPITAL | Age: 64
Discharge: HOME | End: 2024-10-24

## 2024-10-24 LAB
RAD ONC ARIA COURSE ID: NORMAL
RAD ONC ARIA COURSE INTENT: NORMAL
RAD ONC ARIA COURSE LAST TREATMENT DATE: NORMAL
RAD ONC ARIA COURSE START DATE: NORMAL
RAD ONC ARIA COURSE TREATMENT ELAPSED DAYS: 10
RAD ONC ARIA FIRST TREATMENT DATE: NORMAL
RAD ONC ARIA PLAN FRACTIONS TREATED TO DATE: 9
RAD ONC ARIA PLAN ID: NORMAL
RAD ONC ARIA PLAN PRESCRIBED DOSE PER FRACTION: 2 GY
RAD ONC ARIA PLAN PRIMARY REFERENCE POINT: NORMAL
RAD ONC ARIA PLAN TOTAL FRACTIONS PRESCRIBED: 30
RAD ONC ARIA PLAN TOTAL PRESCRIBED DOSE: 6000 CGY
RAD ONC ARIA REFERENCE POINT DOSAGE GIVEN TO DATE: 18 GY
RAD ONC ARIA REFERENCE POINT ID: NORMAL
RAD ONC ARIA REFERENCE POINT SESSION DOSAGE GIVEN: 2 GY

## 2024-10-24 PROCEDURE — 77336 RADIATION PHYSICS CONSULT: CPT | Performed by: INTERNAL MEDICINE

## 2024-10-24 PROCEDURE — 77386: CPT | Performed by: INTERNAL MEDICINE

## 2024-10-24 PROCEDURE — 77014 CHG CT GUIDANCE RADIATION THERAPY FLDS PLACEMENT: CPT | Performed by: INTERNAL MEDICINE

## 2024-10-25 ENCOUNTER — HOSPITAL ENCOUNTER (OUTPATIENT)
Dept: RADIATION ONCOLOGY | Facility: HOSPITAL | Age: 64
Discharge: HOME | End: 2024-10-25

## 2024-10-25 DIAGNOSIS — K20.80 RADIATION-INDUCED ESOPHAGITIS: Primary | ICD-10-CM

## 2024-10-25 DIAGNOSIS — C77.1 NON-SMALL CELL LUNG CANCER METASTATIC TO INTRATHORACIC LYMPH NODE: ICD-10-CM

## 2024-10-25 DIAGNOSIS — C34.11 PRIMARY ADENOCARCINOMA OF UPPER LOBE OF RIGHT LUNG: ICD-10-CM

## 2024-10-25 DIAGNOSIS — T66.XXXA RADIATION-INDUCED ESOPHAGITIS: Primary | ICD-10-CM

## 2024-10-25 DIAGNOSIS — C34.90 NON-SMALL CELL LUNG CANCER METASTATIC TO INTRATHORACIC LYMPH NODE: ICD-10-CM

## 2024-10-25 LAB
RAD ONC ARIA COURSE ID: NORMAL
RAD ONC ARIA COURSE INTENT: NORMAL
RAD ONC ARIA COURSE LAST TREATMENT DATE: NORMAL
RAD ONC ARIA COURSE START DATE: NORMAL
RAD ONC ARIA COURSE TREATMENT ELAPSED DAYS: 11
RAD ONC ARIA FIRST TREATMENT DATE: NORMAL
RAD ONC ARIA PLAN FRACTIONS TREATED TO DATE: 10
RAD ONC ARIA PLAN ID: NORMAL
RAD ONC ARIA PLAN PRESCRIBED DOSE PER FRACTION: 2 GY
RAD ONC ARIA PLAN PRIMARY REFERENCE POINT: NORMAL
RAD ONC ARIA PLAN TOTAL FRACTIONS PRESCRIBED: 30
RAD ONC ARIA PLAN TOTAL PRESCRIBED DOSE: 6000 CGY
RAD ONC ARIA REFERENCE POINT DOSAGE GIVEN TO DATE: 20 GY
RAD ONC ARIA REFERENCE POINT ID: NORMAL
RAD ONC ARIA REFERENCE POINT SESSION DOSAGE GIVEN: 2 GY

## 2024-10-25 PROCEDURE — 77014 CHG CT GUIDANCE RADIATION THERAPY FLDS PLACEMENT: CPT | Performed by: INTERNAL MEDICINE

## 2024-10-25 PROCEDURE — 77386: CPT | Performed by: INTERNAL MEDICINE

## 2024-10-25 RX ORDER — LIDOCAINE HYDROCHLORIDE 20 MG/ML
5 SOLUTION OROPHARYNGEAL
Qty: 100 ML | Refills: 3 | Status: SHIPPED | OUTPATIENT
Start: 2024-10-25

## 2024-10-28 ENCOUNTER — RADIATION ONCOLOGY WEEKLY ASSESSMENT (OUTPATIENT)
Dept: RADIATION ONCOLOGY | Facility: HOSPITAL | Age: 64
End: 2024-10-28
Payer: COMMERCIAL

## 2024-10-28 ENCOUNTER — HOSPITAL ENCOUNTER (OUTPATIENT)
Dept: RADIATION ONCOLOGY | Facility: HOSPITAL | Age: 64
Discharge: HOME | End: 2024-10-28
Payer: COMMERCIAL

## 2024-10-28 VITALS
HEART RATE: 84 BPM | BODY MASS INDEX: 25.34 KG/M2 | TEMPERATURE: 97 F | OXYGEN SATURATION: 98 % | SYSTOLIC BLOOD PRESSURE: 112 MMHG | DIASTOLIC BLOOD PRESSURE: 75 MMHG | HEIGHT: 68 IN | WEIGHT: 167.2 LBS | RESPIRATION RATE: 18 BRPM

## 2024-10-28 DIAGNOSIS — C34.11 PRIMARY ADENOCARCINOMA OF UPPER LOBE OF RIGHT LUNG: Primary | ICD-10-CM

## 2024-10-28 DIAGNOSIS — C77.1 NON-SMALL CELL LUNG CANCER METASTATIC TO INTRATHORACIC LYMPH NODE: ICD-10-CM

## 2024-10-28 DIAGNOSIS — C34.90 NON-SMALL CELL LUNG CANCER METASTATIC TO INTRATHORACIC LYMPH NODE: ICD-10-CM

## 2024-10-28 LAB
RAD ONC ARIA COURSE ID: NORMAL
RAD ONC ARIA COURSE INTENT: NORMAL
RAD ONC ARIA COURSE LAST TREATMENT DATE: NORMAL
RAD ONC ARIA COURSE START DATE: NORMAL
RAD ONC ARIA COURSE TREATMENT ELAPSED DAYS: 14
RAD ONC ARIA FIRST TREATMENT DATE: NORMAL
RAD ONC ARIA PLAN FRACTIONS TREATED TO DATE: 11
RAD ONC ARIA PLAN ID: NORMAL
RAD ONC ARIA PLAN PRESCRIBED DOSE PER FRACTION: 2 GY
RAD ONC ARIA PLAN PRIMARY REFERENCE POINT: NORMAL
RAD ONC ARIA PLAN TOTAL FRACTIONS PRESCRIBED: 30
RAD ONC ARIA PLAN TOTAL PRESCRIBED DOSE: 6000 CGY
RAD ONC ARIA REFERENCE POINT DOSAGE GIVEN TO DATE: 22 GY
RAD ONC ARIA REFERENCE POINT ID: NORMAL
RAD ONC ARIA REFERENCE POINT SESSION DOSAGE GIVEN: 2 GY

## 2024-10-28 PROCEDURE — 77014 CHG CT GUIDANCE RADIATION THERAPY FLDS PLACEMENT: CPT | Performed by: INTERNAL MEDICINE

## 2024-10-28 PROCEDURE — 77386: CPT | Performed by: INTERNAL MEDICINE

## 2024-10-28 PROCEDURE — FACE2FACE: Performed by: INTERNAL MEDICINE

## 2024-10-28 PROCEDURE — 77427 RADIATION TX MANAGEMENT X5: CPT | Performed by: INTERNAL MEDICINE

## 2024-10-28 NOTE — PROGRESS NOTES
[facility] RADIATION ONCOLOGY  ON-TREATMENT VISIT NOTE    NAME: Laura Higginbotham  YOB: 1960  MRN #: [id number]  DATE OF SERVICE: 10/28/2024  PRESCRIBING PHYSICIAN: Ivan Dasilva MD    DIAGNOSIS:      ICD-10-CM ICD-9-CM   1. Primary adenocarcinoma of upper lobe of right lung  C34.11 162.3   2. 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  C34.90 162.9    C77.1 196.1      RADIATION THERAPY VISIT:  Continue radiation therapy, Dosimetry plan remains acceptable, Films reviewed and remains acceptable, Pain assessed, Pain management planned, Radiation dose schedule reviewed and remains acceptable, Radiation technique remains acceptable, and Symptoms within expected range    Radiation Treatments       Active   Plans   RUL   Most recent treatment: Dose planned: 200 cGy (fraction 11 on 10/28/2024)   Total: Dose planned: 6,000 cGy (30 fractions)   Elapsed Days: 14      Reference Points   RUL   Most recent treatment: Dose given: 200 cGy (on 10/28/2024)   Total: Dose given: 2,200 cGy   Elapsed Days: 14                    [x] Concurrent Chemo   Regimen:  Pemetrexed/ Cisplatin     PHYSICAL ASSESSMENT         Vitals:    10/28/24 0935   BP: 112/75   Pulse: 84   Resp: 18   Temp: 97 °F (36.1 °C)   SpO2: 98%      Wt Readings from Last 3 Encounters:   10/28/24 75.8 kg (167 lb 3.2 oz)   10/21/24 74.1 kg (163 lb 6.4 oz)   10/16/24 77 kg (169 lb 12.8 oz)     Restricted in physically strenuous activity but ambulatory and able to carry out work of a light or sedentary nature, e.g., light house work, office work = 1    We examined the relevant areas: yes  Findings are within the expected range for this stage of treatment: yes    ACTION ITEMS     Patient tolerating treatment well and as expected for this stage in their treatment and Continue radiation therapy as planned    Estimated Completion Date: 11/25/2024    Anticipatory guidance provided    Refilled prescription for liquid oxycodone to  help reduce coughing symptoms. Using with liquid lidocaine to help with pain.     Patient experiencing fatigue.  Will continue to monitor.    Patient has noticed recent weight loss.  Encouraged appropriate nutrition.  Will monitor closely.      Ivan Dasilva MD  Radiation Oncology  [facility]

## 2024-10-29 ENCOUNTER — HOSPITAL ENCOUNTER (OUTPATIENT)
Dept: RADIATION ONCOLOGY | Facility: HOSPITAL | Age: 64
Discharge: HOME | End: 2024-10-29

## 2024-10-29 DIAGNOSIS — K20.80 RADIATION-INDUCED ESOPHAGITIS: Primary | ICD-10-CM

## 2024-10-29 DIAGNOSIS — T66.XXXA RADIATION-INDUCED ESOPHAGITIS: Primary | ICD-10-CM

## 2024-10-29 LAB
RAD ONC ARIA COURSE ID: NORMAL
RAD ONC ARIA COURSE INTENT: NORMAL
RAD ONC ARIA COURSE LAST TREATMENT DATE: NORMAL
RAD ONC ARIA COURSE START DATE: NORMAL
RAD ONC ARIA COURSE TREATMENT ELAPSED DAYS: 15
RAD ONC ARIA FIRST TREATMENT DATE: NORMAL
RAD ONC ARIA PLAN FRACTIONS TREATED TO DATE: 12
RAD ONC ARIA PLAN ID: NORMAL
RAD ONC ARIA PLAN PRESCRIBED DOSE PER FRACTION: 2 GY
RAD ONC ARIA PLAN PRIMARY REFERENCE POINT: NORMAL
RAD ONC ARIA PLAN TOTAL FRACTIONS PRESCRIBED: 30
RAD ONC ARIA PLAN TOTAL PRESCRIBED DOSE: 6000 CGY
RAD ONC ARIA REFERENCE POINT DOSAGE GIVEN TO DATE: 24 GY
RAD ONC ARIA REFERENCE POINT ID: NORMAL
RAD ONC ARIA REFERENCE POINT SESSION DOSAGE GIVEN: 2 GY

## 2024-10-29 PROCEDURE — 77386: CPT | Performed by: INTERNAL MEDICINE

## 2024-10-29 PROCEDURE — 77014 CHG CT GUIDANCE RADIATION THERAPY FLDS PLACEMENT: CPT | Performed by: INTERNAL MEDICINE

## 2024-10-30 ENCOUNTER — HOSPITAL ENCOUNTER (OUTPATIENT)
Dept: RADIATION ONCOLOGY | Facility: HOSPITAL | Age: 64
Discharge: HOME | End: 2024-10-30

## 2024-10-30 LAB
RAD ONC ARIA COURSE ID: NORMAL
RAD ONC ARIA COURSE INTENT: NORMAL
RAD ONC ARIA COURSE LAST TREATMENT DATE: NORMAL
RAD ONC ARIA COURSE START DATE: NORMAL
RAD ONC ARIA COURSE TREATMENT ELAPSED DAYS: 16
RAD ONC ARIA FIRST TREATMENT DATE: NORMAL
RAD ONC ARIA PLAN FRACTIONS TREATED TO DATE: 13
RAD ONC ARIA PLAN ID: NORMAL
RAD ONC ARIA PLAN PRESCRIBED DOSE PER FRACTION: 2 GY
RAD ONC ARIA PLAN PRIMARY REFERENCE POINT: NORMAL
RAD ONC ARIA PLAN TOTAL FRACTIONS PRESCRIBED: 30
RAD ONC ARIA PLAN TOTAL PRESCRIBED DOSE: 6000 CGY
RAD ONC ARIA REFERENCE POINT DOSAGE GIVEN TO DATE: 26 GY
RAD ONC ARIA REFERENCE POINT ID: NORMAL
RAD ONC ARIA REFERENCE POINT SESSION DOSAGE GIVEN: 2 GY

## 2024-10-30 PROCEDURE — 77386: CPT | Performed by: INTERNAL MEDICINE

## 2024-10-30 PROCEDURE — 77014 CHG CT GUIDANCE RADIATION THERAPY FLDS PLACEMENT: CPT | Performed by: INTERNAL MEDICINE

## 2024-10-31 ENCOUNTER — HOSPITAL ENCOUNTER (OUTPATIENT)
Dept: RADIATION ONCOLOGY | Facility: HOSPITAL | Age: 64
Discharge: HOME | End: 2024-10-31

## 2024-10-31 DIAGNOSIS — C77.1 NON-SMALL CELL LUNG CANCER METASTATIC TO INTRATHORACIC LYMPH NODE: ICD-10-CM

## 2024-10-31 DIAGNOSIS — C34.11 MALIGNANT NEOPLASM OF UPPER LOBE OF RIGHT LUNG: Primary | ICD-10-CM

## 2024-10-31 DIAGNOSIS — C34.90 NON-SMALL CELL LUNG CANCER METASTATIC TO INTRATHORACIC LYMPH NODE: ICD-10-CM

## 2024-10-31 LAB
RAD ONC ARIA COURSE ID: NORMAL
RAD ONC ARIA COURSE INTENT: NORMAL
RAD ONC ARIA COURSE LAST TREATMENT DATE: NORMAL
RAD ONC ARIA COURSE START DATE: NORMAL
RAD ONC ARIA COURSE TREATMENT ELAPSED DAYS: 17
RAD ONC ARIA FIRST TREATMENT DATE: NORMAL
RAD ONC ARIA PLAN FRACTIONS TREATED TO DATE: 14
RAD ONC ARIA PLAN ID: NORMAL
RAD ONC ARIA PLAN PRESCRIBED DOSE PER FRACTION: 2 GY
RAD ONC ARIA PLAN PRIMARY REFERENCE POINT: NORMAL
RAD ONC ARIA PLAN TOTAL FRACTIONS PRESCRIBED: 30
RAD ONC ARIA PLAN TOTAL PRESCRIBED DOSE: 6000 CGY
RAD ONC ARIA REFERENCE POINT DOSAGE GIVEN TO DATE: 28 GY
RAD ONC ARIA REFERENCE POINT ID: NORMAL
RAD ONC ARIA REFERENCE POINT SESSION DOSAGE GIVEN: 2 GY

## 2024-10-31 PROCEDURE — 77014 CHG CT GUIDANCE RADIATION THERAPY FLDS PLACEMENT: CPT | Performed by: INTERNAL MEDICINE

## 2024-10-31 PROCEDURE — 77386: CPT | Performed by: INTERNAL MEDICINE

## 2024-10-31 PROCEDURE — 77336 RADIATION PHYSICS CONSULT: CPT | Performed by: INTERNAL MEDICINE

## 2024-11-01 ENCOUNTER — HOSPITAL ENCOUNTER (OUTPATIENT)
Dept: RADIATION ONCOLOGY | Facility: HOSPITAL | Age: 64
Setting detail: RADIATION/ONCOLOGY SERIES
End: 2024-11-01
Payer: COMMERCIAL

## 2024-11-01 ENCOUNTER — HOSPITAL ENCOUNTER (OUTPATIENT)
Dept: RADIATION ONCOLOGY | Facility: HOSPITAL | Age: 64
Discharge: HOME | End: 2024-11-01

## 2024-11-01 LAB
RAD ONC ARIA COURSE ID: NORMAL
RAD ONC ARIA COURSE INTENT: NORMAL
RAD ONC ARIA COURSE LAST TREATMENT DATE: NORMAL
RAD ONC ARIA COURSE START DATE: NORMAL
RAD ONC ARIA COURSE TREATMENT ELAPSED DAYS: 18
RAD ONC ARIA FIRST TREATMENT DATE: NORMAL
RAD ONC ARIA PLAN FRACTIONS TREATED TO DATE: 15
RAD ONC ARIA PLAN ID: NORMAL
RAD ONC ARIA PLAN PRESCRIBED DOSE PER FRACTION: 2 GY
RAD ONC ARIA PLAN PRIMARY REFERENCE POINT: NORMAL
RAD ONC ARIA PLAN TOTAL FRACTIONS PRESCRIBED: 30
RAD ONC ARIA PLAN TOTAL PRESCRIBED DOSE: 6000 CGY
RAD ONC ARIA REFERENCE POINT DOSAGE GIVEN TO DATE: 30 GY
RAD ONC ARIA REFERENCE POINT ID: NORMAL
RAD ONC ARIA REFERENCE POINT SESSION DOSAGE GIVEN: 2 GY

## 2024-11-01 PROCEDURE — 77386: CPT | Performed by: INTERNAL MEDICINE

## 2024-11-01 PROCEDURE — 77014 CHG CT GUIDANCE RADIATION THERAPY FLDS PLACEMENT: CPT | Performed by: INTERNAL MEDICINE

## 2024-11-04 ENCOUNTER — HOSPITAL ENCOUNTER (OUTPATIENT)
Dept: RADIATION ONCOLOGY | Facility: HOSPITAL | Age: 64
Discharge: HOME | End: 2024-11-04
Payer: COMMERCIAL

## 2024-11-04 ENCOUNTER — HOSPITAL ENCOUNTER (OUTPATIENT)
Dept: ONCOLOGY | Facility: HOSPITAL | Age: 64
Discharge: HOME | End: 2024-11-04
Payer: COMMERCIAL

## 2024-11-04 ENCOUNTER — OFFICE VISIT (OUTPATIENT)
Dept: ONCOLOGY | Facility: CLINIC | Age: 64
End: 2024-11-04
Payer: COMMERCIAL

## 2024-11-04 VITALS
WEIGHT: 163 LBS | DIASTOLIC BLOOD PRESSURE: 79 MMHG | TEMPERATURE: 98.1 F | HEIGHT: 68 IN | SYSTOLIC BLOOD PRESSURE: 123 MMHG | RESPIRATION RATE: 16 BRPM | OXYGEN SATURATION: 100 % | BODY MASS INDEX: 24.71 KG/M2 | HEART RATE: 78 BPM

## 2024-11-04 VITALS
BODY MASS INDEX: 24.83 KG/M2 | HEIGHT: 68 IN | TEMPERATURE: 98.1 F | SYSTOLIC BLOOD PRESSURE: 123 MMHG | OXYGEN SATURATION: 100 % | WEIGHT: 163.8 LBS | DIASTOLIC BLOOD PRESSURE: 79 MMHG | HEART RATE: 78 BPM

## 2024-11-04 DIAGNOSIS — C34.11 MALIGNANT NEOPLASM OF UPPER LOBE OF RIGHT LUNG: Primary | ICD-10-CM

## 2024-11-04 DIAGNOSIS — T45.1X5A CHEMOTHERAPY INDUCED NAUSEA AND VOMITING: ICD-10-CM

## 2024-11-04 DIAGNOSIS — C34.90 NON-SMALL CELL LUNG CANCER METASTATIC TO INTRATHORACIC LYMPH NODE: ICD-10-CM

## 2024-11-04 DIAGNOSIS — R11.2 CHEMOTHERAPY INDUCED NAUSEA AND VOMITING: ICD-10-CM

## 2024-11-04 DIAGNOSIS — C34.11 MALIGNANT NEOPLASM OF UPPER LOBE OF RIGHT LUNG: ICD-10-CM

## 2024-11-04 DIAGNOSIS — C77.1 NON-SMALL CELL LUNG CANCER METASTATIC TO INTRATHORACIC LYMPH NODE: ICD-10-CM

## 2024-11-04 DIAGNOSIS — C77.1 NON-SMALL CELL LUNG CANCER METASTATIC TO INTRATHORACIC LYMPH NODE: Primary | ICD-10-CM

## 2024-11-04 DIAGNOSIS — C34.90 NON-SMALL CELL LUNG CANCER METASTATIC TO INTRATHORACIC LYMPH NODE: Primary | ICD-10-CM

## 2024-11-04 LAB
ALBUMIN SERPL-MCNC: 3.9 G/DL (ref 3.5–5.2)
ALBUMIN/GLOB SERPL: 1.1 G/DL
ALP SERPL-CCNC: 109 U/L (ref 39–117)
ALT SERPL W P-5'-P-CCNC: 12 U/L (ref 1–33)
ANION GAP SERPL CALCULATED.3IONS-SCNC: 11.1 MMOL/L (ref 5–15)
AST SERPL-CCNC: 11 U/L (ref 1–32)
BASOPHILS # BLD AUTO: 0.04 10*3/MM3 (ref 0–0.2)
BASOPHILS NFR BLD AUTO: 0.5 % (ref 0–1.5)
BILIRUB SERPL-MCNC: 0.2 MG/DL (ref 0–1.2)
BUN SERPL-MCNC: 12 MG/DL (ref 8–23)
BUN/CREAT SERPL: 18.2 (ref 7–25)
CALCIUM SPEC-SCNC: 10.1 MG/DL (ref 8.6–10.5)
CHLORIDE SERPL-SCNC: 96 MMOL/L (ref 98–107)
CO2 SERPL-SCNC: 25.9 MMOL/L (ref 22–29)
CREAT SERPL-MCNC: 0.66 MG/DL (ref 0.57–1)
DEPRECATED RDW RBC AUTO: 44.4 FL (ref 37–54)
EGFRCR SERPLBLD CKD-EPI 2021: 98.1 ML/MIN/1.73
EOSINOPHIL # BLD AUTO: 0.01 10*3/MM3 (ref 0–0.4)
EOSINOPHIL NFR BLD AUTO: 0.1 % (ref 0.3–6.2)
ERYTHROCYTE [DISTWIDTH] IN BLOOD BY AUTOMATED COUNT: 14.9 % (ref 12.3–15.4)
GLOBULIN UR ELPH-MCNC: 3.5 GM/DL
GLUCOSE SERPL-MCNC: 322 MG/DL (ref 65–99)
HCT VFR BLD AUTO: 35.1 % (ref 34–46.6)
HGB BLD-MCNC: 11.5 G/DL (ref 12–15.9)
LYMPHOCYTES # BLD AUTO: 0.63 10*3/MM3 (ref 0.7–3.1)
LYMPHOCYTES NFR BLD AUTO: 7.1 % (ref 19.6–45.3)
MAGNESIUM SERPL-MCNC: 2.1 MG/DL (ref 1.6–2.4)
MCH RBC QN AUTO: 27.9 PG (ref 26.6–33)
MCHC RBC AUTO-ENTMCNC: 32.8 G/DL (ref 31.5–35.7)
MCV RBC AUTO: 85.2 FL (ref 79–97)
MONOCYTES # BLD AUTO: 0.53 10*3/MM3 (ref 0.1–0.9)
MONOCYTES NFR BLD AUTO: 6 % (ref 5–12)
NEUTROPHILS NFR BLD AUTO: 7.64 10*3/MM3 (ref 1.7–7)
NEUTROPHILS NFR BLD AUTO: 86.3 % (ref 42.7–76)
PLATELET # BLD AUTO: 400 10*3/MM3 (ref 140–450)
PMV BLD AUTO: 8.1 FL (ref 6–12)
POTASSIUM SERPL-SCNC: 4.2 MMOL/L (ref 3.5–5.2)
PROT SERPL-MCNC: 7.4 G/DL (ref 6–8.5)
RAD ONC ARIA COURSE ID: NORMAL
RAD ONC ARIA COURSE INTENT: NORMAL
RAD ONC ARIA COURSE LAST TREATMENT DATE: NORMAL
RAD ONC ARIA COURSE START DATE: NORMAL
RAD ONC ARIA COURSE TREATMENT ELAPSED DAYS: 21
RAD ONC ARIA FIRST TREATMENT DATE: NORMAL
RAD ONC ARIA PLAN FRACTIONS TREATED TO DATE: 16
RAD ONC ARIA PLAN ID: NORMAL
RAD ONC ARIA PLAN PRESCRIBED DOSE PER FRACTION: 2 GY
RAD ONC ARIA PLAN PRIMARY REFERENCE POINT: NORMAL
RAD ONC ARIA PLAN TOTAL FRACTIONS PRESCRIBED: 30
RAD ONC ARIA PLAN TOTAL PRESCRIBED DOSE: 6000 CGY
RAD ONC ARIA REFERENCE POINT DOSAGE GIVEN TO DATE: 32 GY
RAD ONC ARIA REFERENCE POINT ID: NORMAL
RAD ONC ARIA REFERENCE POINT SESSION DOSAGE GIVEN: 2 GY
RBC # BLD AUTO: 4.12 10*6/MM3 (ref 3.77–5.28)
SODIUM SERPL-SCNC: 133 MMOL/L (ref 136–145)
WBC NRBC COR # BLD AUTO: 8.85 10*3/MM3 (ref 3.4–10.8)

## 2024-11-04 PROCEDURE — 77386: CPT | Performed by: INTERNAL MEDICINE

## 2024-11-04 PROCEDURE — 77014 CHG CT GUIDANCE RADIATION THERAPY FLDS PLACEMENT: CPT | Performed by: INTERNAL MEDICINE

## 2024-11-04 PROCEDURE — 96366 THER/PROPH/DIAG IV INF ADDON: CPT

## 2024-11-04 PROCEDURE — 25010000002 PEMETREXED PER 10 MG: Performed by: INTERNAL MEDICINE

## 2024-11-04 PROCEDURE — 25010000002 MAGNESIUM SULFATE PER 500 MG OF MAGNESIUM: Performed by: INTERNAL MEDICINE

## 2024-11-04 PROCEDURE — 96367 TX/PROPH/DG ADDL SEQ IV INF: CPT

## 2024-11-04 PROCEDURE — 96411 CHEMO IV PUSH ADDL DRUG: CPT

## 2024-11-04 PROCEDURE — 25010000002 CISPLATIN PER 50 MG: Performed by: INTERNAL MEDICINE

## 2024-11-04 PROCEDURE — 85025 COMPLETE CBC W/AUTO DIFF WBC: CPT | Performed by: NURSE PRACTITIONER

## 2024-11-04 PROCEDURE — 25010000002 PALONOSETRON 0.25 MG/5ML SOLUTION PREFILLED SYRINGE: Performed by: INTERNAL MEDICINE

## 2024-11-04 PROCEDURE — 25010000002 FOSAPREPITANT PER 1 MG: Performed by: INTERNAL MEDICINE

## 2024-11-04 PROCEDURE — 96413 CHEMO IV INFUSION 1 HR: CPT

## 2024-11-04 PROCEDURE — 96375 TX/PRO/DX INJ NEW DRUG ADDON: CPT

## 2024-11-04 PROCEDURE — 25810000003 SODIUM CHLORIDE 0.9 % SOLUTION 1,000 ML FLEX CONT: Performed by: INTERNAL MEDICINE

## 2024-11-04 PROCEDURE — 25810000003 SODIUM CHLORIDE 0.9 % SOLUTION 500 ML FLEX CONT: Performed by: INTERNAL MEDICINE

## 2024-11-04 PROCEDURE — 83735 ASSAY OF MAGNESIUM: CPT | Performed by: NURSE PRACTITIONER

## 2024-11-04 PROCEDURE — 25010000002 HEPARIN LOCK FLUSH PER 10 UNITS: Performed by: INTERNAL MEDICINE

## 2024-11-04 PROCEDURE — 77427 RADIATION TX MANAGEMENT X5: CPT | Performed by: INTERNAL MEDICINE

## 2024-11-04 PROCEDURE — 25010000002 POTASSIUM CHLORIDE PER 2 MEQ OF POTASSIUM: Performed by: INTERNAL MEDICINE

## 2024-11-04 PROCEDURE — 80053 COMPREHEN METABOLIC PANEL: CPT | Performed by: INTERNAL MEDICINE

## 2024-11-04 PROCEDURE — 96415 CHEMO IV INFUSION ADDL HR: CPT

## 2024-11-04 RX ORDER — PALONOSETRON 0.05 MG/ML
0.25 INJECTION, SOLUTION INTRAVENOUS ONCE
Status: COMPLETED | OUTPATIENT
Start: 2024-11-04 | End: 2024-11-04

## 2024-11-04 RX ORDER — HEPARIN SODIUM (PORCINE) LOCK FLUSH IV SOLN 100 UNIT/ML 100 UNIT/ML
500 SOLUTION INTRAVENOUS AS NEEDED
OUTPATIENT
Start: 2024-11-04

## 2024-11-04 RX ORDER — HEPARIN SODIUM (PORCINE) LOCK FLUSH IV SOLN 100 UNIT/ML 100 UNIT/ML
500 SOLUTION INTRAVENOUS AS NEEDED
Status: CANCELLED | OUTPATIENT
Start: 2024-11-04

## 2024-11-04 RX ORDER — HEPARIN SODIUM (PORCINE) LOCK FLUSH IV SOLN 100 UNIT/ML 100 UNIT/ML
500 SOLUTION INTRAVENOUS AS NEEDED
Status: DISCONTINUED | OUTPATIENT
Start: 2024-11-04 | End: 2024-11-05 | Stop reason: HOSPADM

## 2024-11-04 RX ORDER — SODIUM CHLORIDE 9 MG/ML
20 INJECTION, SOLUTION INTRAVENOUS ONCE
Status: DISCONTINUED | OUTPATIENT
Start: 2024-11-04 | End: 2024-11-05 | Stop reason: HOSPADM

## 2024-11-04 RX ORDER — SODIUM CHLORIDE 0.9 % (FLUSH) 0.9 %
10 SYRINGE (ML) INJECTION AS NEEDED
Status: CANCELLED | OUTPATIENT
Start: 2024-11-04

## 2024-11-04 RX ORDER — PROCHLORPERAZINE MALEATE 10 MG
10 TABLET ORAL EVERY 6 HOURS PRN
Qty: 60 TABLET | Refills: 1 | Status: SHIPPED | OUTPATIENT
Start: 2024-11-04

## 2024-11-04 RX ORDER — METFORMIN HYDROCHLORIDE 500 MG/1
TABLET, EXTENDED RELEASE ORAL
COMMUNITY

## 2024-11-04 RX ORDER — LEVOTHYROXINE, LIOTHYRONINE 19; 4.5 UG/1; UG/1
TABLET ORAL
COMMUNITY

## 2024-11-04 RX ORDER — SODIUM CHLORIDE 0.9 % (FLUSH) 0.9 %
10 SYRINGE (ML) INJECTION AS NEEDED
OUTPATIENT
Start: 2024-11-04

## 2024-11-04 RX ORDER — SODIUM CHLORIDE 0.9 % (FLUSH) 0.9 %
10 SYRINGE (ML) INJECTION AS NEEDED
Status: DISCONTINUED | OUTPATIENT
Start: 2024-11-04 | End: 2024-11-05 | Stop reason: HOSPADM

## 2024-11-04 RX ORDER — SODIUM CHLORIDE 9 MG/ML
20 INJECTION, SOLUTION INTRAVENOUS ONCE
Status: CANCELLED | OUTPATIENT
Start: 2024-11-04

## 2024-11-04 RX ORDER — PALONOSETRON 0.05 MG/ML
0.25 INJECTION, SOLUTION INTRAVENOUS ONCE
Status: CANCELLED | OUTPATIENT
Start: 2024-11-04

## 2024-11-04 RX ADMIN — Medication 10 ML: at 17:00

## 2024-11-04 RX ADMIN — HEPARIN 500 UNITS: 100 SYRINGE at 17:00

## 2024-11-04 RX ADMIN — CISPLATIN 140 MG: 1 INJECTION INTRAVENOUS at 12:52

## 2024-11-04 RX ADMIN — POTASSIUM CHLORIDE: 2 INJECTION, SOLUTION, CONCENTRATE INTRAVENOUS at 15:09

## 2024-11-04 RX ADMIN — POTASSIUM CHLORIDE: 2 INJECTION, SOLUTION, CONCENTRATE INTRAVENOUS at 09:50

## 2024-11-04 RX ADMIN — PALONOSETRON 0.25 MG: 0.25 INJECTION, SOLUTION INTRAVENOUS at 09:47

## 2024-11-04 RX ADMIN — PEMETREXED DISODIUM 960 MG: 500 INJECTION, POWDER, LYOPHILIZED, FOR SOLUTION INTRAVENOUS at 12:38

## 2024-11-04 RX ADMIN — FOSAPREPITANT 100 ML: 150 INJECTION, POWDER, LYOPHILIZED, FOR SOLUTION INTRAVENOUS at 11:58

## 2024-11-04 NOTE — PROGRESS NOTES
Pt here for C2D1 Alimta/Cisplatin following scheduled visit with Dr Huynh,  pt reports took po steroids as instructed and has been taking folic acid daily

## 2024-11-04 NOTE — PROGRESS NOTES
Port accessed and flushed with good blood return noted. 10cc of blood wasted prior to specimen collection. Blood specimen obtained and sent to lab for processing per protocol.  Port flushed with saline and left in for infusion today. CMP sent STAT per order. Patient sent to lobby to await MD cartagena.

## 2024-11-04 NOTE — PATIENT INSTRUCTIONS
Try compazine on day 3, if better than zyprexa use compazine instead    RTC in 3 weeks with labs for C3 of chemo

## 2024-11-05 ENCOUNTER — HOSPITAL ENCOUNTER (OUTPATIENT)
Dept: RADIATION ONCOLOGY | Facility: HOSPITAL | Age: 64
Discharge: HOME | End: 2024-11-05

## 2024-11-05 LAB
RAD ONC ARIA COURSE ID: NORMAL
RAD ONC ARIA COURSE INTENT: NORMAL
RAD ONC ARIA COURSE LAST TREATMENT DATE: NORMAL
RAD ONC ARIA COURSE START DATE: NORMAL
RAD ONC ARIA COURSE TREATMENT ELAPSED DAYS: 22
RAD ONC ARIA FIRST TREATMENT DATE: NORMAL
RAD ONC ARIA PLAN FRACTIONS TREATED TO DATE: 17
RAD ONC ARIA PLAN ID: NORMAL
RAD ONC ARIA PLAN PRESCRIBED DOSE PER FRACTION: 2 GY
RAD ONC ARIA PLAN PRIMARY REFERENCE POINT: NORMAL
RAD ONC ARIA PLAN TOTAL FRACTIONS PRESCRIBED: 30
RAD ONC ARIA PLAN TOTAL PRESCRIBED DOSE: 6000 CGY
RAD ONC ARIA REFERENCE POINT DOSAGE GIVEN TO DATE: 34 GY
RAD ONC ARIA REFERENCE POINT ID: NORMAL
RAD ONC ARIA REFERENCE POINT SESSION DOSAGE GIVEN: 2 GY

## 2024-11-05 PROCEDURE — 77386: CPT | Performed by: INTERNAL MEDICINE

## 2024-11-05 PROCEDURE — 77014 CHG CT GUIDANCE RADIATION THERAPY FLDS PLACEMENT: CPT | Performed by: INTERNAL MEDICINE

## 2024-11-06 ENCOUNTER — HOSPITAL ENCOUNTER (OUTPATIENT)
Dept: RADIATION ONCOLOGY | Facility: HOSPITAL | Age: 64
Discharge: HOME | End: 2024-11-06

## 2024-11-06 LAB
RAD ONC ARIA COURSE ID: NORMAL
RAD ONC ARIA COURSE INTENT: NORMAL
RAD ONC ARIA COURSE LAST TREATMENT DATE: NORMAL
RAD ONC ARIA COURSE START DATE: NORMAL
RAD ONC ARIA COURSE TREATMENT ELAPSED DAYS: 23
RAD ONC ARIA FIRST TREATMENT DATE: NORMAL
RAD ONC ARIA PLAN FRACTIONS TREATED TO DATE: 18
RAD ONC ARIA PLAN ID: NORMAL
RAD ONC ARIA PLAN PRESCRIBED DOSE PER FRACTION: 2 GY
RAD ONC ARIA PLAN PRIMARY REFERENCE POINT: NORMAL
RAD ONC ARIA PLAN TOTAL FRACTIONS PRESCRIBED: 30
RAD ONC ARIA PLAN TOTAL PRESCRIBED DOSE: 6000 CGY
RAD ONC ARIA REFERENCE POINT DOSAGE GIVEN TO DATE: 36 GY
RAD ONC ARIA REFERENCE POINT ID: NORMAL
RAD ONC ARIA REFERENCE POINT SESSION DOSAGE GIVEN: 2 GY

## 2024-11-06 PROCEDURE — 77014 CHG CT GUIDANCE RADIATION THERAPY FLDS PLACEMENT: CPT | Performed by: INTERNAL MEDICINE

## 2024-11-06 PROCEDURE — 77386: CPT | Performed by: INTERNAL MEDICINE

## 2024-11-07 ENCOUNTER — HOSPITAL ENCOUNTER (OUTPATIENT)
Dept: RADIATION ONCOLOGY | Facility: HOSPITAL | Age: 64
Discharge: HOME | End: 2024-11-07

## 2024-11-07 ENCOUNTER — RADIATION ONCOLOGY WEEKLY ASSESSMENT (OUTPATIENT)
Dept: RADIATION ONCOLOGY | Facility: HOSPITAL | Age: 64
End: 2024-11-07
Payer: COMMERCIAL

## 2024-11-07 VITALS
DIASTOLIC BLOOD PRESSURE: 70 MMHG | OXYGEN SATURATION: 100 % | WEIGHT: 165 LBS | SYSTOLIC BLOOD PRESSURE: 113 MMHG | BODY MASS INDEX: 25.09 KG/M2

## 2024-11-07 DIAGNOSIS — C34.11 MALIGNANT NEOPLASM OF UPPER LOBE OF RIGHT LUNG: Primary | ICD-10-CM

## 2024-11-07 DIAGNOSIS — C77.1 NON-SMALL CELL LUNG CANCER METASTATIC TO INTRATHORACIC LYMPH NODE: ICD-10-CM

## 2024-11-07 DIAGNOSIS — C34.90 NON-SMALL CELL LUNG CANCER METASTATIC TO INTRATHORACIC LYMPH NODE: ICD-10-CM

## 2024-11-07 DIAGNOSIS — C34.11 PRIMARY ADENOCARCINOMA OF UPPER LOBE OF RIGHT LUNG: ICD-10-CM

## 2024-11-07 LAB
RAD ONC ARIA COURSE ID: NORMAL
RAD ONC ARIA COURSE INTENT: NORMAL
RAD ONC ARIA COURSE LAST TREATMENT DATE: NORMAL
RAD ONC ARIA COURSE START DATE: NORMAL
RAD ONC ARIA COURSE TREATMENT ELAPSED DAYS: 24
RAD ONC ARIA FIRST TREATMENT DATE: NORMAL
RAD ONC ARIA PLAN FRACTIONS TREATED TO DATE: 19
RAD ONC ARIA PLAN ID: NORMAL
RAD ONC ARIA PLAN PRESCRIBED DOSE PER FRACTION: 2 GY
RAD ONC ARIA PLAN PRIMARY REFERENCE POINT: NORMAL
RAD ONC ARIA PLAN TOTAL FRACTIONS PRESCRIBED: 30
RAD ONC ARIA PLAN TOTAL PRESCRIBED DOSE: 6000 CGY
RAD ONC ARIA REFERENCE POINT DOSAGE GIVEN TO DATE: 38 GY
RAD ONC ARIA REFERENCE POINT ID: NORMAL
RAD ONC ARIA REFERENCE POINT SESSION DOSAGE GIVEN: 2 GY

## 2024-11-07 PROCEDURE — 77336 RADIATION PHYSICS CONSULT: CPT | Performed by: INTERNAL MEDICINE

## 2024-11-07 PROCEDURE — 77014 CHG CT GUIDANCE RADIATION THERAPY FLDS PLACEMENT: CPT | Performed by: INTERNAL MEDICINE

## 2024-11-07 PROCEDURE — 77386: CPT | Performed by: INTERNAL MEDICINE

## 2024-11-07 RX ORDER — OXYCODONE HCL 5 MG/5 ML
5 SOLUTION, ORAL ORAL EVERY 4 HOURS PRN
Qty: 200 ML | Refills: 0 | Status: SHIPPED | OUTPATIENT
Start: 2024-11-07

## 2024-11-07 NOTE — PROGRESS NOTES
[facility] RADIATION ONCOLOGY  ON-TREATMENT VISIT NOTE    NAME: Laura Higginbotham  YOB: 1960  MRN #: [id number]  DATE OF SERVICE: 11/7/2024  PRESCRIBING PHYSICIAN: Ivan Dasilva MD    DIAGNOSIS:      ICD-10-CM ICD-9-CM   1. Malignant neoplasm of upper lobe of right lung  C34.11 162.3        RADIATION THERAPY VISIT:  Continue radiation therapy, Dosimetry plan remains acceptable, Films reviewed and remains acceptable, Pain assessed, Pain management planned, Radiation dose schedule reviewed and remains acceptable, Radiation technique remains acceptable, and Symptoms within expected range    Radiation Treatments       Active   Plans   RUL   Most recent treatment: Dose planned: 200 cGy (fraction 19 on 11/7/2024)   Total: Dose planned: 6,000 cGy (30 fractions)   Elapsed Days: 24      Reference Points   RUL   Most recent treatment: Dose given: 200 cGy (on 11/7/2024)   Total: Dose given: 3,800 cGy   Elapsed Days: 24                    [x] Concurrent Chemo   Regimen:  Pemetrexed/ Cisplatin     PHYSICAL ASSESSMENT         Vitals:    11/07/24 0923   BP: 113/70   SpO2: 100%      Wt Readings from Last 3 Encounters:   11/07/24 74.8 kg (165 lb)   11/04/24 73.9 kg (163 lb)   11/04/24 74.3 kg (163 lb 12.8 oz)     Restricted in physically strenuous activity but ambulatory and able to carry out work of a light or sedentary nature, e.g., light house work, office work = 1    We examined the relevant areas: yes  Findings are within the expected range for this stage of treatment: yes    ACTION ITEMS     Patient tolerating treatment well and as expected for this stage in their treatment and Continue radiation therapy as planned    Estimated Completion Date: 11/25/2024    Anticipatory guidance provided    Refilled prescription for liquid oxycodone to help reduce coughing symptoms. Using MMM to help with pain.     Patient experiencing fatigue.  Will continue to monitor.    Patient has noticed recent weight loss.   Encouraged appropriate nutrition.  Will monitor closely.      Ivan Dasilva MD  Radiation Oncology  [facility]

## 2024-11-08 ENCOUNTER — HOSPITAL ENCOUNTER (OUTPATIENT)
Dept: RADIATION ONCOLOGY | Facility: HOSPITAL | Age: 64
Discharge: HOME | End: 2024-11-08

## 2024-11-08 LAB
RAD ONC ARIA COURSE ID: NORMAL
RAD ONC ARIA COURSE INTENT: NORMAL
RAD ONC ARIA COURSE LAST TREATMENT DATE: NORMAL
RAD ONC ARIA COURSE START DATE: NORMAL
RAD ONC ARIA COURSE TREATMENT ELAPSED DAYS: 25
RAD ONC ARIA FIRST TREATMENT DATE: NORMAL
RAD ONC ARIA PLAN FRACTIONS TREATED TO DATE: 20
RAD ONC ARIA PLAN ID: NORMAL
RAD ONC ARIA PLAN PRESCRIBED DOSE PER FRACTION: 2 GY
RAD ONC ARIA PLAN PRIMARY REFERENCE POINT: NORMAL
RAD ONC ARIA PLAN TOTAL FRACTIONS PRESCRIBED: 30
RAD ONC ARIA PLAN TOTAL PRESCRIBED DOSE: 6000 CGY
RAD ONC ARIA REFERENCE POINT DOSAGE GIVEN TO DATE: 40 GY
RAD ONC ARIA REFERENCE POINT ID: NORMAL
RAD ONC ARIA REFERENCE POINT SESSION DOSAGE GIVEN: 2 GY

## 2024-11-08 PROCEDURE — 77014 CHG CT GUIDANCE RADIATION THERAPY FLDS PLACEMENT: CPT | Performed by: INTERNAL MEDICINE

## 2024-11-08 PROCEDURE — 77386: CPT | Performed by: INTERNAL MEDICINE

## 2024-11-11 ENCOUNTER — RADIATION ONCOLOGY WEEKLY ASSESSMENT (OUTPATIENT)
Dept: RADIATION ONCOLOGY | Facility: HOSPITAL | Age: 64
End: 2024-11-11
Payer: COMMERCIAL

## 2024-11-11 ENCOUNTER — HOSPITAL ENCOUNTER (OUTPATIENT)
Dept: RADIATION ONCOLOGY | Facility: HOSPITAL | Age: 64
Discharge: HOME | End: 2024-11-11
Payer: COMMERCIAL

## 2024-11-11 VITALS
RESPIRATION RATE: 18 BRPM | HEIGHT: 68 IN | WEIGHT: 159 LBS | DIASTOLIC BLOOD PRESSURE: 88 MMHG | TEMPERATURE: 98.3 F | OXYGEN SATURATION: 100 % | SYSTOLIC BLOOD PRESSURE: 143 MMHG | BODY MASS INDEX: 24.1 KG/M2 | HEART RATE: 132 BPM

## 2024-11-11 DIAGNOSIS — C34.11 PRIMARY ADENOCARCINOMA OF UPPER LOBE OF RIGHT LUNG: Primary | ICD-10-CM

## 2024-11-11 LAB
RAD ONC ARIA COURSE ID: NORMAL
RAD ONC ARIA COURSE INTENT: NORMAL
RAD ONC ARIA COURSE LAST TREATMENT DATE: NORMAL
RAD ONC ARIA COURSE START DATE: NORMAL
RAD ONC ARIA COURSE TREATMENT ELAPSED DAYS: 28
RAD ONC ARIA FIRST TREATMENT DATE: NORMAL
RAD ONC ARIA PLAN FRACTIONS TREATED TO DATE: 21
RAD ONC ARIA PLAN ID: NORMAL
RAD ONC ARIA PLAN PRESCRIBED DOSE PER FRACTION: 2 GY
RAD ONC ARIA PLAN PRIMARY REFERENCE POINT: NORMAL
RAD ONC ARIA PLAN TOTAL FRACTIONS PRESCRIBED: 30
RAD ONC ARIA PLAN TOTAL PRESCRIBED DOSE: 6000 CGY
RAD ONC ARIA REFERENCE POINT DOSAGE GIVEN TO DATE: 42 GY
RAD ONC ARIA REFERENCE POINT ID: NORMAL
RAD ONC ARIA REFERENCE POINT SESSION DOSAGE GIVEN: 2 GY

## 2024-11-11 PROCEDURE — 77386: CPT | Performed by: INTERNAL MEDICINE

## 2024-11-11 PROCEDURE — FACE2FACE: Performed by: INTERNAL MEDICINE

## 2024-11-11 PROCEDURE — 77427 RADIATION TX MANAGEMENT X5: CPT | Performed by: INTERNAL MEDICINE

## 2024-11-11 PROCEDURE — 77014 CHG CT GUIDANCE RADIATION THERAPY FLDS PLACEMENT: CPT | Performed by: INTERNAL MEDICINE

## 2024-11-11 NOTE — PROGRESS NOTES
[facility] RADIATION ONCOLOGY  ON-TREATMENT VISIT NOTE    NAME: Laura Higginbotham  YOB: 1960  MRN #: [id number]  DATE OF SERVICE: 11/11/2024  PRESCRIBING PHYSICIAN: Ivan Dasilva MD    DIAGNOSIS:    No diagnosis found.       RADIATION THERAPY VISIT:  Continue radiation therapy, Dosimetry plan remains acceptable, Films reviewed and remains acceptable, Pain assessed, Pain management planned, Radiation dose schedule reviewed and remains acceptable, Radiation technique remains acceptable, and Symptoms within expected range    Radiation Treatments       Active   Plans   RUL   Most recent treatment: Dose planned: 200 cGy (fraction 21 on 11/11/2024)   Total: Dose planned: 6,000 cGy (30 fractions)   Elapsed Days: 28      Reference Points   RUL   Most recent treatment: Dose given: 200 cGy (on 11/11/2024)   Total: Dose given: 4,200 cGy   Elapsed Days: 28                    [x] Concurrent Chemo   Regimen:  Pemetrexed/ Cisplatin     PHYSICAL ASSESSMENT         There were no vitals filed for this visit.     Wt Readings from Last 3 Encounters:   11/07/24 74.8 kg (165 lb)   11/04/24 73.9 kg (163 lb)   11/04/24 74.3 kg (163 lb 12.8 oz)     Restricted in physically strenuous activity but ambulatory and able to carry out work of a light or sedentary nature, e.g., light house work, office work = 1    We examined the relevant areas: yes  Findings are within the expected range for this stage of treatment: yes    ACTION ITEMS     Patient tolerating treatment well and as expected for this stage in their treatment and Continue radiation therapy as planned    Estimated Completion Date: 11/25/2024    Anticipatory guidance provided    Refilled prescription for liquid oxycodone to help reduce coughing symptoms, discussed increasing dose to 7 mm of liquid oxycodone. Using MMM to help with pain.     Patient experiencing fatigue.  Will continue to monitor.    Patient has noticed recent weight loss.  Encouraged  appropriate nutrition.  Will monitor closely.      Ivan Dasilva MD  Radiation Oncology  [facility]

## 2024-11-11 NOTE — PROGRESS NOTES
[facility] RADIATION ONCOLOGY  ON-TREATMENT VISIT NOTE    NAME: Laura Higginbotham  YOB: 1960  MRN #: [id number]  DATE OF SERVICE: 11/11/2024  PRESCRIBING PHYSICIAN: Ivan Dasilva MD    DIAGNOSIS:      ICD-10-CM ICD-9-CM   1. Primary adenocarcinoma of upper lobe of right lung  C34.11 162.3        RADIATION THERAPY VISIT:  Continue radiation therapy, Dosimetry plan remains acceptable, Films reviewed and remains acceptable, Pain assessed, Pain management planned, Radiation dose schedule reviewed and remains acceptable, Radiation technique remains acceptable, and Symptoms within expected range    Radiation Treatments       Active   Plans   RUL   Most recent treatment: Dose planned: 200 cGy (fraction 21 on 11/11/2024)   Total: Dose planned: 6,000 cGy (30 fractions)   Elapsed Days: 28      Reference Points   RUL   Most recent treatment: Dose given: 200 cGy (on 11/11/2024)   Total: Dose given: 4,200 cGy   Elapsed Days: 28                    [x] Concurrent Chemo   Regimen:  Pemetrexed/ Cisplatin     PHYSICAL ASSESSMENT         Vitals:    11/11/24 0909   BP: 143/88   Pulse: (!) 132   Resp: 18   Temp: 98.3 °F (36.8 °C)   SpO2: 100%      Wt Readings from Last 3 Encounters:   11/11/24 72.1 kg (159 lb)   11/07/24 74.8 kg (165 lb)   11/04/24 73.9 kg (163 lb)     Restricted in physically strenuous activity but ambulatory and able to carry out work of a light or sedentary nature, e.g., light house work, office work = 1    We examined the relevant areas: yes  Findings are within the expected range for this stage of treatment: yes    ACTION ITEMS     Patient tolerating treatment well and as expected for this stage in their treatment and Continue radiation therapy as planned    Estimated Completion Date: 11/25/2024    Anticipatory guidance provided    Refilled prescription for liquid oxycodone to help reduce coughing symptoms. Using MMM to help with pain.     Patient experiencing fatigue.  Will continue to  monitor.    Patient has noticed recent weight loss.  Encouraged appropriate nutrition.  Will monitor closely.      Ivan Dasilva MD  Radiation Oncology  [facility]

## 2024-11-12 ENCOUNTER — HOSPITAL ENCOUNTER (OUTPATIENT)
Dept: RADIATION ONCOLOGY | Facility: HOSPITAL | Age: 64
Discharge: HOME | End: 2024-11-12

## 2024-11-12 LAB
RAD ONC ARIA COURSE ID: NORMAL
RAD ONC ARIA COURSE INTENT: NORMAL
RAD ONC ARIA COURSE LAST TREATMENT DATE: NORMAL
RAD ONC ARIA COURSE START DATE: NORMAL
RAD ONC ARIA COURSE TREATMENT ELAPSED DAYS: 29
RAD ONC ARIA FIRST TREATMENT DATE: NORMAL
RAD ONC ARIA PLAN FRACTIONS TREATED TO DATE: 22
RAD ONC ARIA PLAN ID: NORMAL
RAD ONC ARIA PLAN PRESCRIBED DOSE PER FRACTION: 2 GY
RAD ONC ARIA PLAN PRIMARY REFERENCE POINT: NORMAL
RAD ONC ARIA PLAN TOTAL FRACTIONS PRESCRIBED: 30
RAD ONC ARIA PLAN TOTAL PRESCRIBED DOSE: 6000 CGY
RAD ONC ARIA REFERENCE POINT DOSAGE GIVEN TO DATE: 44 GY
RAD ONC ARIA REFERENCE POINT ID: NORMAL
RAD ONC ARIA REFERENCE POINT SESSION DOSAGE GIVEN: 2 GY

## 2024-11-12 PROCEDURE — 77338 DESIGN MLC DEVICE FOR IMRT: CPT | Performed by: INTERNAL MEDICINE

## 2024-11-12 PROCEDURE — 77386: CPT | Performed by: INTERNAL MEDICINE

## 2024-11-12 PROCEDURE — 77301 RADIOTHERAPY DOSE PLAN IMRT: CPT | Performed by: INTERNAL MEDICINE

## 2024-11-12 PROCEDURE — 77300 RADIATION THERAPY DOSE PLAN: CPT | Performed by: INTERNAL MEDICINE

## 2024-11-13 ENCOUNTER — HOSPITAL ENCOUNTER (OUTPATIENT)
Dept: RADIATION ONCOLOGY | Facility: HOSPITAL | Age: 64
Discharge: HOME | End: 2024-11-13

## 2024-11-13 LAB
RAD ONC ARIA COURSE ID: NORMAL
RAD ONC ARIA COURSE INTENT: NORMAL
RAD ONC ARIA COURSE LAST TREATMENT DATE: NORMAL
RAD ONC ARIA COURSE START DATE: NORMAL
RAD ONC ARIA COURSE TREATMENT ELAPSED DAYS: 30
RAD ONC ARIA FIRST TREATMENT DATE: NORMAL
RAD ONC ARIA PLAN FRACTIONS TREATED TO DATE: 1
RAD ONC ARIA PLAN ID: NORMAL
RAD ONC ARIA PLAN PRESCRIBED DOSE PER FRACTION: 2 GY
RAD ONC ARIA PLAN PRIMARY REFERENCE POINT: NORMAL
RAD ONC ARIA PLAN TOTAL FRACTIONS PRESCRIBED: 8
RAD ONC ARIA PLAN TOTAL PRESCRIBED DOSE: 1600 CGY
RAD ONC ARIA REFERENCE POINT DOSAGE GIVEN TO DATE: 46 GY
RAD ONC ARIA REFERENCE POINT ID: NORMAL
RAD ONC ARIA REFERENCE POINT SESSION DOSAGE GIVEN: 2 GY

## 2024-11-13 PROCEDURE — 77386: CPT | Performed by: INTERNAL MEDICINE

## 2024-11-13 PROCEDURE — 77014 CHG CT GUIDANCE RADIATION THERAPY FLDS PLACEMENT: CPT | Performed by: INTERNAL MEDICINE

## 2024-11-14 ENCOUNTER — HOSPITAL ENCOUNTER (OUTPATIENT)
Dept: RADIATION ONCOLOGY | Facility: HOSPITAL | Age: 64
Discharge: HOME | End: 2024-11-14

## 2024-11-14 LAB
RAD ONC ARIA COURSE ID: NORMAL
RAD ONC ARIA COURSE INTENT: NORMAL
RAD ONC ARIA COURSE LAST TREATMENT DATE: NORMAL
RAD ONC ARIA COURSE START DATE: NORMAL
RAD ONC ARIA COURSE TREATMENT ELAPSED DAYS: 31
RAD ONC ARIA FIRST TREATMENT DATE: NORMAL
RAD ONC ARIA PLAN FRACTIONS TREATED TO DATE: 2
RAD ONC ARIA PLAN ID: NORMAL
RAD ONC ARIA PLAN PRESCRIBED DOSE PER FRACTION: 2 GY
RAD ONC ARIA PLAN PRIMARY REFERENCE POINT: NORMAL
RAD ONC ARIA PLAN TOTAL FRACTIONS PRESCRIBED: 8
RAD ONC ARIA PLAN TOTAL PRESCRIBED DOSE: 1600 CGY
RAD ONC ARIA REFERENCE POINT DOSAGE GIVEN TO DATE: 48 GY
RAD ONC ARIA REFERENCE POINT ID: NORMAL
RAD ONC ARIA REFERENCE POINT SESSION DOSAGE GIVEN: 2 GY

## 2024-11-14 PROCEDURE — 77014 CHG CT GUIDANCE RADIATION THERAPY FLDS PLACEMENT: CPT | Performed by: INTERNAL MEDICINE

## 2024-11-14 PROCEDURE — 77336 RADIATION PHYSICS CONSULT: CPT | Performed by: INTERNAL MEDICINE

## 2024-11-14 PROCEDURE — 77386: CPT | Performed by: INTERNAL MEDICINE

## 2024-11-15 ENCOUNTER — HOSPITAL ENCOUNTER (OUTPATIENT)
Dept: RADIATION ONCOLOGY | Facility: HOSPITAL | Age: 64
Discharge: HOME | End: 2024-11-15

## 2024-11-15 LAB
RAD ONC ARIA COURSE ID: NORMAL
RAD ONC ARIA COURSE INTENT: NORMAL
RAD ONC ARIA COURSE LAST TREATMENT DATE: NORMAL
RAD ONC ARIA COURSE START DATE: NORMAL
RAD ONC ARIA COURSE TREATMENT ELAPSED DAYS: 32
RAD ONC ARIA FIRST TREATMENT DATE: NORMAL
RAD ONC ARIA PLAN FRACTIONS TREATED TO DATE: 3
RAD ONC ARIA PLAN ID: NORMAL
RAD ONC ARIA PLAN PRESCRIBED DOSE PER FRACTION: 2 GY
RAD ONC ARIA PLAN PRIMARY REFERENCE POINT: NORMAL
RAD ONC ARIA PLAN TOTAL FRACTIONS PRESCRIBED: 8
RAD ONC ARIA PLAN TOTAL PRESCRIBED DOSE: 1600 CGY
RAD ONC ARIA REFERENCE POINT DOSAGE GIVEN TO DATE: 50 GY
RAD ONC ARIA REFERENCE POINT ID: NORMAL
RAD ONC ARIA REFERENCE POINT SESSION DOSAGE GIVEN: 2 GY

## 2024-11-15 PROCEDURE — 77014 CHG CT GUIDANCE RADIATION THERAPY FLDS PLACEMENT: CPT | Performed by: INTERNAL MEDICINE

## 2024-11-15 PROCEDURE — 77386: CPT | Performed by: INTERNAL MEDICINE

## 2024-11-18 ENCOUNTER — RADIATION ONCOLOGY WEEKLY ASSESSMENT (OUTPATIENT)
Dept: RADIATION ONCOLOGY | Facility: HOSPITAL | Age: 64
End: 2024-11-18
Payer: COMMERCIAL

## 2024-11-18 ENCOUNTER — HOSPITAL ENCOUNTER (OUTPATIENT)
Dept: RADIATION ONCOLOGY | Facility: HOSPITAL | Age: 64
Discharge: HOME | End: 2024-11-18
Payer: COMMERCIAL

## 2024-11-18 VITALS
DIASTOLIC BLOOD PRESSURE: 72 MMHG | HEART RATE: 94 BPM | SYSTOLIC BLOOD PRESSURE: 112 MMHG | BODY MASS INDEX: 24.77 KG/M2 | RESPIRATION RATE: 18 BRPM | TEMPERATURE: 97.3 F | OXYGEN SATURATION: 100 % | WEIGHT: 163.4 LBS | HEIGHT: 68 IN

## 2024-11-18 DIAGNOSIS — C34.90 NON-SMALL CELL LUNG CANCER METASTATIC TO INTRATHORACIC LYMPH NODE: ICD-10-CM

## 2024-11-18 DIAGNOSIS — C34.11 PRIMARY ADENOCARCINOMA OF UPPER LOBE OF RIGHT LUNG: Primary | ICD-10-CM

## 2024-11-18 DIAGNOSIS — C77.1 NON-SMALL CELL LUNG CANCER METASTATIC TO INTRATHORACIC LYMPH NODE: ICD-10-CM

## 2024-11-18 LAB
RAD ONC ARIA COURSE ID: NORMAL
RAD ONC ARIA COURSE INTENT: NORMAL
RAD ONC ARIA COURSE LAST TREATMENT DATE: NORMAL
RAD ONC ARIA COURSE START DATE: NORMAL
RAD ONC ARIA COURSE TREATMENT ELAPSED DAYS: 35
RAD ONC ARIA FIRST TREATMENT DATE: NORMAL
RAD ONC ARIA PLAN FRACTIONS TREATED TO DATE: 4
RAD ONC ARIA PLAN ID: NORMAL
RAD ONC ARIA PLAN PRESCRIBED DOSE PER FRACTION: 2 GY
RAD ONC ARIA PLAN PRIMARY REFERENCE POINT: NORMAL
RAD ONC ARIA PLAN TOTAL FRACTIONS PRESCRIBED: 8
RAD ONC ARIA PLAN TOTAL PRESCRIBED DOSE: 1600 CGY
RAD ONC ARIA REFERENCE POINT DOSAGE GIVEN TO DATE: 52 GY
RAD ONC ARIA REFERENCE POINT ID: NORMAL
RAD ONC ARIA REFERENCE POINT SESSION DOSAGE GIVEN: 2 GY

## 2024-11-18 PROCEDURE — 77427 RADIATION TX MANAGEMENT X5: CPT | Performed by: INTERNAL MEDICINE

## 2024-11-18 PROCEDURE — 77386: CPT | Performed by: INTERNAL MEDICINE

## 2024-11-18 PROCEDURE — 77014 CHG CT GUIDANCE RADIATION THERAPY FLDS PLACEMENT: CPT | Performed by: INTERNAL MEDICINE

## 2024-11-18 RX ORDER — OXYCODONE HCL 5 MG/5 ML
7.5 SOLUTION, ORAL ORAL EVERY 4 HOURS PRN
Qty: 300 ML | Refills: 0 | Status: SHIPPED | OUTPATIENT
Start: 2024-11-18

## 2024-11-18 NOTE — PROGRESS NOTES
[facility] RADIATION ONCOLOGY  ON-TREATMENT VISIT NOTE    NAME: Laura Higginbotham  YOB: 1960  MRN #: [id number]  DATE OF SERVICE: 11/18/2024  PRESCRIBING PHYSICIAN: Ivan Dasilva MD    DIAGNOSIS:      ICD-10-CM ICD-9-CM   1. Primary adenocarcinoma of upper lobe of right lung  C34.11 162.3          RADIATION THERAPY VISIT:  Continue radiation therapy, Dosimetry plan remains acceptable, Films reviewed and remains acceptable, Pain assessed, Pain management planned, Radiation dose schedule reviewed and remains acceptable, Radiation technique remains acceptable, and Symptoms within expected range    Radiation Treatments       Active   Plans   RUL   Most recent treatment: Dose planned: 200 cGy (fraction 22 on 11/12/2024)   Total: Dose planned: 6,000 cGy (30 fractions)   Elapsed Days: 29      RUL Adj   Most recent treatment: Dose planned: 200 cGy (fraction 4 on 11/18/2024)   Total: Dose planned: 1,600 cGy (8 fractions)   Elapsed Days: 35      Reference Points   RUL   Most recent treatment: Dose given: 200 cGy (on 11/18/2024)   Total: Dose given: 5,200 cGy   Elapsed Days: 35                    [x] Concurrent Chemo   Regimen:  Pemetrexed/ Cisplatin     PHYSICAL ASSESSMENT         Vitals:    11/18/24 0926   BP: 112/72   Pulse: 94   Resp: 18   Temp: 97.3 °F (36.3 °C)   SpO2: 100%        Wt Readings from Last 3 Encounters:   11/18/24 74.1 kg (163 lb 6.4 oz)   11/11/24 72.1 kg (159 lb)   11/07/24 74.8 kg (165 lb)     Restricted in physically strenuous activity but ambulatory and able to carry out work of a light or sedentary nature, e.g., light house work, office work = 1    We examined the relevant areas: yes  Findings are within the expected range for this stage of treatment: yes    ACTION ITEMS     Patient tolerating treatment well and as expected for this stage in their treatment and Continue radiation therapy as planned    Estimated Completion Date: 11/25/2024    Anticipatory guidance  provided    Refilled prescription for liquid oxycodone to help reduce coughing symptoms, discussed increasing dose to 7 mm of liquid oxycodone. Using MMM to help with pain.     Patient experiencing fatigue.  Will continue to monitor.    Patient weight stable.    Reviewed appropriate skin care.    Plan for 3 month post-treatment imaging.      Ivan Dasilva MD  Radiation Oncology  [facility]

## 2024-11-19 ENCOUNTER — HOSPITAL ENCOUNTER (OUTPATIENT)
Dept: RADIATION ONCOLOGY | Facility: HOSPITAL | Age: 64
Discharge: HOME | End: 2024-11-19

## 2024-11-19 LAB
RAD ONC ARIA COURSE ID: NORMAL
RAD ONC ARIA COURSE INTENT: NORMAL
RAD ONC ARIA COURSE LAST TREATMENT DATE: NORMAL
RAD ONC ARIA COURSE START DATE: NORMAL
RAD ONC ARIA COURSE TREATMENT ELAPSED DAYS: 36
RAD ONC ARIA FIRST TREATMENT DATE: NORMAL
RAD ONC ARIA PLAN FRACTIONS TREATED TO DATE: 5
RAD ONC ARIA PLAN ID: NORMAL
RAD ONC ARIA PLAN PRESCRIBED DOSE PER FRACTION: 2 GY
RAD ONC ARIA PLAN PRIMARY REFERENCE POINT: NORMAL
RAD ONC ARIA PLAN TOTAL FRACTIONS PRESCRIBED: 8
RAD ONC ARIA PLAN TOTAL PRESCRIBED DOSE: 1600 CGY
RAD ONC ARIA REFERENCE POINT DOSAGE GIVEN TO DATE: 54 GY
RAD ONC ARIA REFERENCE POINT ID: NORMAL
RAD ONC ARIA REFERENCE POINT SESSION DOSAGE GIVEN: 2 GY

## 2024-11-19 PROCEDURE — 77386: CPT | Performed by: INTERNAL MEDICINE

## 2024-11-19 PROCEDURE — 77014 CHG CT GUIDANCE RADIATION THERAPY FLDS PLACEMENT: CPT | Performed by: INTERNAL MEDICINE

## 2024-11-20 ENCOUNTER — HOSPITAL ENCOUNTER (OUTPATIENT)
Dept: RADIATION ONCOLOGY | Facility: HOSPITAL | Age: 64
Discharge: HOME | End: 2024-11-20

## 2024-11-20 LAB
RAD ONC ARIA COURSE ID: NORMAL
RAD ONC ARIA COURSE INTENT: NORMAL
RAD ONC ARIA COURSE LAST TREATMENT DATE: NORMAL
RAD ONC ARIA COURSE START DATE: NORMAL
RAD ONC ARIA COURSE TREATMENT ELAPSED DAYS: 37
RAD ONC ARIA FIRST TREATMENT DATE: NORMAL
RAD ONC ARIA PLAN FRACTIONS TREATED TO DATE: 6
RAD ONC ARIA PLAN ID: NORMAL
RAD ONC ARIA PLAN PRESCRIBED DOSE PER FRACTION: 2 GY
RAD ONC ARIA PLAN PRIMARY REFERENCE POINT: NORMAL
RAD ONC ARIA PLAN TOTAL FRACTIONS PRESCRIBED: 8
RAD ONC ARIA PLAN TOTAL PRESCRIBED DOSE: 1600 CGY
RAD ONC ARIA REFERENCE POINT DOSAGE GIVEN TO DATE: 56 GY
RAD ONC ARIA REFERENCE POINT ID: NORMAL
RAD ONC ARIA REFERENCE POINT SESSION DOSAGE GIVEN: 2 GY

## 2024-11-20 PROCEDURE — 77386: CPT | Performed by: INTERNAL MEDICINE

## 2024-11-20 PROCEDURE — 77014 CHG CT GUIDANCE RADIATION THERAPY FLDS PLACEMENT: CPT | Performed by: INTERNAL MEDICINE

## 2024-11-21 ENCOUNTER — HOSPITAL ENCOUNTER (OUTPATIENT)
Dept: RADIATION ONCOLOGY | Facility: HOSPITAL | Age: 64
Discharge: HOME | End: 2024-11-21

## 2024-11-21 LAB
RAD ONC ARIA COURSE ID: NORMAL
RAD ONC ARIA COURSE INTENT: NORMAL
RAD ONC ARIA COURSE LAST TREATMENT DATE: NORMAL
RAD ONC ARIA COURSE START DATE: NORMAL
RAD ONC ARIA COURSE TREATMENT ELAPSED DAYS: 38
RAD ONC ARIA FIRST TREATMENT DATE: NORMAL
RAD ONC ARIA PLAN FRACTIONS TREATED TO DATE: 7
RAD ONC ARIA PLAN ID: NORMAL
RAD ONC ARIA PLAN PRESCRIBED DOSE PER FRACTION: 2 GY
RAD ONC ARIA PLAN PRIMARY REFERENCE POINT: NORMAL
RAD ONC ARIA PLAN TOTAL FRACTIONS PRESCRIBED: 8
RAD ONC ARIA PLAN TOTAL PRESCRIBED DOSE: 1600 CGY
RAD ONC ARIA REFERENCE POINT DOSAGE GIVEN TO DATE: 58 GY
RAD ONC ARIA REFERENCE POINT ID: NORMAL
RAD ONC ARIA REFERENCE POINT SESSION DOSAGE GIVEN: 2 GY

## 2024-11-21 PROCEDURE — 77336 RADIATION PHYSICS CONSULT: CPT | Performed by: INTERNAL MEDICINE

## 2024-11-21 PROCEDURE — 77014 CHG CT GUIDANCE RADIATION THERAPY FLDS PLACEMENT: CPT | Performed by: INTERNAL MEDICINE

## 2024-11-21 PROCEDURE — 77386: CPT | Performed by: INTERNAL MEDICINE

## 2024-11-21 NOTE — PROGRESS NOTES
HEMATOLOGY ONCOLOGY OUTPATIENT FOLLOW UP       Patient name: Laura Higginbotham  : 1960  MRN: [id number]  Primary Care Physician: Laura Archer  Referring Physician: Laura Archer  Reason For Consult: right hilar adenocarcinoma of the lung    History of Present Illness:  Patient is a 64 y.o. PMH of smoking, skin cancer, arthritis, GERD who presents for newly diagnosed lung cancer.    -2024 Pt went to Dr Archer for increased nonproductive coughing and feeling more dysnpeic on exertion. Was dx with pleurisy and given prednisone which helped somewhat.  -2024 Since cough hadn't completely gone away in 6 weeks, patient went back to Dr. Archer who became concerned and ordered a CXR and antibiotics.  -CXR showed a right hilar mass, CT was ordered.    -2024 CT chest at [facility]: Revealed a very large right hilar mass measuring 9.2 x 9.5 cm in diameter with hypodensity centrally suggesting a necrotic center.  There is obstruction of the right upper lobe bronchus with the infiltrate seen extending peripheral to that area and marked elevation of the right hilum.  Mass extends into the right hilar lymph nodes.    Patient was seen in consultation by Dr. Crystal Powell, pulmonology. She denied having a cough, hemoptysis, wheezing, dyspnea, fever, unintentional weight loss.    -2020 for EBUS bronchoscopy by Dr. Crystal Powell with FNA to the right hilar node 10R and to the right hilar mass revealing inés revealing malignant cells favoring non-small cell.    Pathology showed in the right hilar mass IHC positive for CK7, TTF-1, and Napsin, negative for p63 and CK/6 consistent with pulmonary adenocarcinoma negative for CD56, chromogranin, synaptophysin excluding neuroendocrine component.  Per discussion with pathology: Passes from the right hilar mass were found to be acellular, viable tissue was from the lymph node biopsied and 1 cassette is available for future testing.  -Pneumocystis was negative,  respiratory  culture showing rare growth of normal respiratory alexi with no S. aureus or Pseudomonas aeruginosa detected.,  Respiratory panel PCR negative, PRELIM: no acid-fast bacilli seen on concentrated smear at 1 week, No isolated fungus at 1 week,.  -8/30/2024 CBC showed WBC 11.53, hemoglobin 13.1, hematocrit 40.7 with essentially normal indices, platelets 332K    -9/12/2024 Patient presented for initial consultation with her niece reporting constant nonproductive cough, she still denies having hemoptysis, wheezing, dyspnea, fever, or unintentional weight loss, new bony pains, no new H/A or focal neuro deficit except right under arm new tingling/burning.  She has a history of smoking 1 PPD- started at 17, this summer cut down to 1/2 PPD mostly because of the coughing (47+ years).    -9/20/2024 MRI brain with without contrast with no acute intracranial process identified, findings suggestive of minimal chronic small vessel ischemic disease but no evidence of intracranial metastasis    -9/23/2025 Caris shows PD-L1 positive with TPS 80% by PDL1 test 22C3, with level 1 evidence for cemiplimab and pembrolizumab, positive by PD-L1 28-8 with level 1 evidence for nivolumab/ipilimumab combination, positive by PD-L1  with TC 1+, 50% with atezolizumab in the metastatic setting level 1 evidence, and PD-L1 positive and  positive TC 1+60% with a benefit of atezolizumab in the adjuvant setting, and cemiplimab level 1 evidence.  -ALK negative/fusion not detected by RNA in the tumor, BRAF mutation not detected, EGFR mutation not detected, MET variant transcript not detected, RET fusion not detected, ROS1 fusion not detected.  Tumor mutation burden was high at 18, microsatellite stable.  KRAS pG12V found and 61%, TP53 sR330S found in 56%, NFKBIA was amplified    -9/25/2024 NM PET/CT skull initial staging: Heterogeneous right upper lobe lung mass extending into the superior right lower lobe, encasing the right mainstem bronchus,  encasing and narrowing the right upper lobe bronchus, is hypermetabolic (mSUV 8.76), consistent with malignancy. It has heterogeneous areas of photopenia, suggesting internal necrosis. Pre-carinal lymph node measuring 14 mm short axis is FDG avid (mSUV 4.0), consistent with malignancy.  No hybrid avidity in the neck, abdomen, pelvis, or skeleton. No left hilar adenopathy. No suspicious left lung nodules. Normal heart size with coronary calcifications. Benign calcified mediastinal and left hilar lymph nodes are present.    -10/2/2024 Patient presents in follow up with her niece reporting better nonproductive cough and rib pain since being oxycodone low dose, she still denies having hemoptysis, wheezing, dyspnea, fever, or unintentional weight loss, new bony pains, no new H/A or focal neuro deficit except right under arm new tingling/burning. Got SIMMED yesterday for radiation.  She has a history of smoking 1 PPD- started at 17, still on 1/2 PPD - gum stuck to dentures.    -10/3/2024 B12 1000 mcg given IM  -10/10 s/p Mediport placement    -10/14/2024 started chemoradiation (cisplatin +pemetrexed) reporting still with nonproductive cough and rib pain since being oxycodone low dose. Picked up her nausea medications, got her port,  her Emla cream (ports still a little tender), started her folate soon as she got it.  Did get her chemo teach and got her B12 shot as well she is ready to go.    -11/4/2024 Patient presents in follow up for C2 chemoradiation reporting nonproductive cough and rib pain both better since being oxycodone low dose, using stool softener and yogurt which has greatly helped with stooling appropriately (EOD), patient had a lot of nausea the first 9 days, needing the zofran Q8H that time, a lot of fatigue.      Subjective:  -11/22/2024.  Patient completed radiation therapy to a total dose of 60 Gray (2 Gray per fraction with total of 30 fractions (under Dr. Ivan Dasilva).    -12/2/2024 Patient  presents in follow up for C3 chemoyherapy, completed radiation last week.  She is reporting more productive cough (mostly clear and thick occ some green); reports when she is drinking warm liquids or cold liquids he is still having pain when she tries to swallow the temperature seems to go down her throat where she chokes a little bit and then seems to radiate over to the upper right side of her chest this is relatively new.  Denies having any fevers, chills, pleuritic chest pain, shortness of breath in fact breathing seems to be easier.  She feels wiped out and rib pain better since being oxycodone low dose, using stool softener and yogurt which has greatly helped with stooling appropriately (EOD), patient still had a lot of nausea the first 9 days, even with addition of Zyprexa, needing the zofran Q8H that time, and admits that she is still taking the dexamethasone as prescribed day before, day of, day after and those are proximal to starting day of.    Past Medical History:   Diagnosis Date    Arthritis     Cancer     skin cancer on leg right    GERD (gastroesophageal reflux disease)        Past Surgical History:   Procedure Laterality Date    BRONCHOSCOPY N/A 2024    Procedure: BRONCHOSCOPY WITH BRONCHIAL WASHING ,ENDOBRONCHIAL ULTRASOUND WITH FINE NEEDLE ASPIRATION X2 AREAS;  Surgeon: Crystal Powell MD;  Location: [facility] ENDOSCOPY;  Service: Pulmonary;  Laterality: N/A;     SECTION      x3    KNEE SURGERY Left     ORIF, screws and plates, has had Operation on it x 3         Current Outpatient Medications:     albuterol sulfate  (90 Base) MCG/ACT inhaler, Inhale 1 puff Every 4 (Four) Hours As Needed for Wheezing., Disp: , Rfl:     Breztri Aerosphere 160-9-4.8 MCG/ACT aerosol inhaler, Inhale 2 puffs 2 (Two) Times a Day., Disp: , Rfl:     dexAMETHasone (DECADRON) 4 MG tablet, Take 1 tablet twice a day for 3 consecutive days beginning day before chemo, Disp: 24 tablet, Rfl: 2    famotidine (PEPCID)  20 MG tablet, Take 1 tablet by mouth 2 (Two) Times a Day., Disp: 30 tablet, Rfl: 2    folic acid (Folate) 400 MCG tablet, Take 1 tablet by mouth Daily for 270 days., Disp: 90 tablet, Rfl: 2    gabapentin (NEURONTIN) 100 MG capsule, Take 1 capsule by mouth 2 (Two) Times a Day., Disp: , Rfl:     lidocaine-prilocaine (EMLA) 2.5-2.5 % cream, Apply 1 Application topically to the appropriate area as directed Every 2 (Two) Hours As Needed for Mild Pain. To port site before chemo/blood draw, Disp: 30 g, Rfl: 2    metFORMIN ER (GLUCOPHAGE-XR) 500 MG 24 hr tablet, TAKE 2 TABLETS BY MOUTH DAILY WITH EVENING MEAL, Disp: , Rfl:     NP Thyroid 30 MG tablet, TAKE 1 TABLET BY MOUTH EVERY DAY ON EMPTY STOMACH FOR 30 DAYS, Disp: , Rfl:     Nystatin (magic mouthwash), Swish and swallow 5 mL 4 (Four) Times a Day Before Meals & at Bedtime., Disp: 1 bottle, Rfl: 2    OLANZapine (zyPREXA) 5 MG tablet, Take one tablet every night for four nights starting the night of chemotherapy, Disp: 12 tablet, Rfl: 1    ondansetron ODT (ZOFRAN-ODT) 8 MG disintegrating tablet, Take 1 tablet by mouth Every 8 (Eight) Hours As Needed for Nausea or Vomiting., Disp: 45 tablet, Rfl: 3    oxyCODONE (ROXICODONE) 5 MG/5ML solution, Take 7.5 mL by mouth Every 4 (Four) Hours As Needed for Moderate Pain., Disp: 300 mL, Rfl: 0    prochlorperazine (COMPAZINE) 10 MG tablet, Take 1 tablet by mouth Every 6 (Six) Hours As Needed for Nausea or Vomiting., Disp: 60 tablet, Rfl: 1    benzonatate (TESSALON) 200 MG capsule, Take 100 mg by mouth 3 (Three) Times a Day As Needed for Cough. (Patient not taking: Reported on 12/2/2024), Disp: , Rfl:     escitalopram (LEXAPRO) 10 MG tablet, Take 1 tablet by mouth Daily. (Patient not taking: Reported on 12/2/2024), Disp: , Rfl:     Lidocaine Viscous HCl (XYLOCAINE) 2 % solution, Take 5 mL by mouth 4 (Four) Times a Day Before Meals & at Bedtime. (Patient not taking: Reported on 12/2/2024), Disp: 100 mL, Rfl: 3    LORazepam (ATIVAN) 1  MG tablet, Take 1 tablet by mouth Every 8 (Eight) Hours As Needed for Anxiety. (Patient not taking: Reported on 12/2/2024), Disp: , Rfl:     metFORMIN ER (GLUCOPHAGE-XR) 750 MG 24 hr tablet, Take 1 tablet by mouth Daily With Dinner. (Patient not taking: Reported on 12/2/2024), Disp: , Rfl:     nicotine polacrilex (GoodSense Nicotine) 4 MG gum, Chew 1 each As Needed for Smoking Cessation. (Patient not taking: Reported on 12/2/2024), Disp: 120 each, Rfl: 4    predniSONE (DELTASONE) 10 MG tablet, Take 1 tablet by mouth 2 (Two) Times a Day. X 30 days (Patient not taking: Reported on 12/2/2024), Disp: , Rfl:   No current facility-administered medications for this visit.    Facility-Administered Medications Ordered in Other Visits:     alteplase (CATHFLO/ACTIVASE) injection 2 mg, 2 mg, Intracatheter, Q2H PRN, Holli Dickerson, APRN, New Syringe/Cartridge at 12/02/24 0844    mag sulfate 1 g + potassium chloride 20 mEq in 1000 mL NaCl, 1,000 mL, Intravenous, Once, Veronica Huynh MD PhD, Last Rate: 600 mL/hr at 12/02/24 1023, 1,000 mL at 12/02/24 1023    sodium chloride 0.9 % infusion, 20 mL/hr, Intravenous, Once, Veronica Huynh MD PhD    No Known Allergies    Family History   Problem Relation Age of Onset    Lung cancer Brother         Lung cancer/Leukemia    Cancer Brother        Cancer-related family history includes Cancer in her brother; Lung cancer in her brother.    Social History     Tobacco Use    Smoking status: Every Day     Current packs/day: 0.50     Average packs/day: 0.5 packs/day for 47.9 years (24.0 ttl pk-yrs)     Types: Cigarettes     Start date: 1977    Smokeless tobacco: Never   Vaping Use    Vaping status: Never Used   Substance Use Topics    Alcohol use: Not Currently    Drug use: Never     Social History     Social History Narrative    Not on file        Review of Systems:  Constitutional: +fatigue, Denies any weakness, lack of appetite, excessive appetite, weight change, chills  or fever.  Eyes: Reports no blurry vision, no double vision, no pain in the eyes, dry eyes or tearing.  ENT: Reports no decreased hearing capacity, ear pain or tinnitus. Denies any epistaxis, nasal congestion, mouth sores or bleeding, tooth or jaw pain, sore throat or hoarseness.  Respiratory: + chronic cough (better with oxy)  Denies any sputum production or hemoptysis. There is no wheezing, shortness of breath or any other respiratory complaints.  Cardiovascular: + shortness of breath with activity, Denies any chest pain, shortness of breath when lying down, palpitations, or leg swelling.  Breasts: Denies any lumps, bumps, pain, nipple changes or discharge in the breasts.  Gastrointestinal: +painful swallowing, nausea, vomiting, constipation (better per HPI) There are no complaints of abdominal pain, difficulty swallowing or anorexia, diarrhea,  heartburn, blood in the stools, dark stools, or change in bowel habits or hemorrhoids.  Genitourinary: Denies any pain or burning on urination, blood in the urine or frequent urination.   Musculoskeletal: Denies painful joints, no swelling of the joints, muscle or back pain. Denies any pain or tingling in the legs, hands or feet.  Neuropsychiatric: Denies any nervousness, depressed mood, headaches, blackouts, dizziness, weakness of limbs, loss of sensation, loss of balance, loss of coordination or difficulty in speaking.  Cutaneous: Denies any dry skin, itching, rash, change in the color of the skin, or any other cutaneous complaints.  Hematologic/Lymphatic: Denies any bruising or bleeding. Report no recent development of palpable or painful lymph nodes.  Allergy/Immunology: Denies rash/hayfever symptoms or any recent history of pneumonia, recurrent sinusitis, urinary infection or any other history of an ongoing infection.  Endocrine: Reports no intolerance to heat or cold, excessive thirst or excessive urination.    Objective:  Vital signs:  Vitals:    12/02/24 0758   BP:  "123/77   Pulse: 96   Temp: 98.4 °F (36.9 °C)   TempSrc: Oral   SpO2: 99%   Weight: 72.2 kg (159 lb 3.2 oz)   Height: 172.7 cm (67.99\")   PainSc: 0-No pain     Body mass index is 24.21 kg/m².      Physical Exam:   ECO  GENERAL: The patient is a pleasant middle aged white female who appears their stated age and is awake, appears tired but in no acute distress, alert and oriented x3.  SKIN: No rash or ulcers.  HEME/LYMPHATICS: No bruising or petechiae on visual inspection. No lymphadenopathy on visual inspection and palpation of cervical, supraclavicular, infraclavicular lymph nodes.  HEAD/FACE: atraumatic and normocephalic. Normal hair.  EYES: PERRLA/EOMI. No scleral icterus. No tearing or dry eyes.  ENT: External ears normal with no evidence of discharge. No evidence of ulceration or bleeding in the nostrils. Mouth has no ulcers, bleeding or inflammation of the gums, floor or roof of the mouth with normal dentition.  NECK: Supple, symmetric.   CHEST/RESPIRATORY: Expansion maintained bilaterally and symmetrically. On auscultation, clear breath sounds in left lung, decreased breath sounds in upper right lung field but even better than prior. No wheezes, rhonchi or rales.  BREAST: Deferred.  CARDIOVASCULAR: On auscultation, regular rate and rhythm. No murmurs, gallops or rubs are heard.  GASTROINTESTINAL/ABDOMEN: Symmetric. On auscultation of the abdomen, there are normal bowel sounds in all four quadrants. There are no surgical scars in the abdomen. Nontender to palpation.  GENITOURINARY: Deferred.  NEUROLOGIC: Alert and oriented time, person, and place, with no apparent changes in recent or remote memory. Cranial nerves II-XII are grossly intact. Sensation is maintained in the extremities. Strength and tone appear normal for age and equal in the extremities. Gait is normal.  MUSCULOSKELETAL: No cyanosis, clubbing or edema in the extremities. There are no surgical scars on the extremities.  PSYCHIATRIC: The patient " maintains judgment and has good insight. The patient has no changes in mood or affection.  IV: right chest Mediport accessed, NTTP, no erythema, edema or pus.    Lab Results - Last 18 Months   Lab Units 12/02/24  0815 11/04/24  0747 10/14/24  0748   WBC 10*3/mm3 5.38 8.85 12.12*   HEMOGLOBIN g/dL 10.9* 11.5* 12.3   HEMATOCRIT % 33.0* 35.1 38.3   PLATELETS 10*3/mm3 334 400 477*   MCV fL 88.0 85.2 85.5     Lab Results - Last 18 Months   Lab Units 12/02/24  0815 11/04/24  0747 10/14/24  0752 09/12/24  1151   SODIUM mmol/L 132* 133*  --  134*   POTASSIUM mmol/L 4.6 4.2  --  4.3   CHLORIDE mmol/L 97* 96*  --  98   CO2 mmol/L 21.8* 25.9  --  25.5   BUN mg/dL 12 12  --  8   CREATININE mg/dL 0.78 0.66 0.80 0.73   CALCIUM mg/dL 9.8 10.1  --  9.6   BILIRUBIN mg/dL 0.2 0.2  --  0.2   ALK PHOS U/L 106 109  --  104   ALT (SGPT) U/L 15 12  --  12   AST (SGOT) U/L 15 11  --  12   GLUCOSE mg/dL 406* 322*  --  251*       Lab Results   Component Value Date    GLUCOSE 406 (C) 12/02/2024    BUN 12 12/02/2024    CREATININE 0.78 12/02/2024    BCR 15.4 12/02/2024    K 4.6 12/02/2024    CO2 21.8 (L) 12/02/2024    CALCIUM 9.8 12/02/2024    ALBUMIN 3.7 12/02/2024    AST 15 12/02/2024    ALT 15 12/02/2024       Lab Results - Last 18 Months   Lab Units 10/10/24  0810 08/26/24  0945   INR  <0.93* 0.93   APTT seconds 30.8 27.1     Lab Results   Component Value Date    PTT 30.8 10/10/2024    INR <0.93 (L) 10/10/2024         Assessment & Plan   64 y.o. female with PMH of smoking, skin cancer, arthritis, GERD who presented 8/2024  for newly diagnosed lung cancer, currently on definitive chemoradiation.    Right hilar mass/right upper lobe adenocarcinoma, at minimum T4 N2 (IIIB)     Previously discussed the above results which include imaging and pathology with the patient.  -Staging PET/CT no metastatic disease but confirmed IIIB disease, nonsurgical candidate-previously discussed this in detail with the patient and her family and explained that  the standard of care at this point would be chemoradiation with curative intent.  Discussed the couple chemotherapy regimens that can be used for radiation.  -Staging brain MRI negative for metastasis    -Radiology Oncology consult-pt recieving concurrent chemoradiation   -PFTs from pulmonologist done per patient- per patient 50-60%? Sees him soon-December.    -Biomarker testing +PD-L1 testing (category 1), +KRAS G12C, negative EGFR mutation including exon 19 del, L858R and other deletions, exon 20 insertions, (category 1), ALK fusions (category 1), ROS1 fusions, BRAF V600E, NTRK 1/2/3 fusions, MET exon 14 skipping or amplifications, RET fusions, ErbB2 (HER2) alterations,    PLAN: cisplatin plus pemetrexed Q21 days X 3 cycles with concurrent radiation with repeat imaging approx 30 days after, followed by durvalumab 1500 mg Q4 weeks x 12 cycles (category 1 for stage III)     - s/p B12 shot one week prior to chemo (will then be next due C3),  - folic acid 1 mg daily and informed pt to start taking immediately     -got chemoteach (can get first B12 injection at that time)  -10/10 s/p Mediport placement with EMLA cream available   -Will do CBC, CMP, mag while on treatment    Physical exam and labs within parameters, proceed with cycle 3 chemotherapy return to clinic in 4 weeks with labs for C4 chemotherapy (per patient she does not want to be acutely ill and retching during [place] with her family); CT scan scheduled for 2/24/2024 by [facility] with f/u 3 days late  **Given her continued pain and almost lateralizing symptoms when eating/drinking different temperatures since her tumor is so close to the mediastinum and places, I am concerned about the T-E fistula and have ordered a CT chest with IV and p.o. contrast ASAP.    -*Data has shown a Palliative consult to help manage symptoms early on in care for advanced stage lung cancer patients.      2. chemotherapy associated nausea/vomiting  -preventative palonosetron 0.25  mg IV, fosaprepitant 150 mg IV, day 2 PO dexamethasone 4 mg twice daily T- 1, T0, T+ 1 take as directed  -PRN Zyprexa, and as needed Zofran (using Q8H d 2-11)- added compazine; if compazine working better than zyprexa, stop zyprexa and just use compazine and zofran      3. Tobacco dependence  -tried nicotine gum but stuck to dentures, is already cutting back on her own; is down to 1/2 ppd (but only 1/2 a cigarette when she smokes)    4.  Hyperglycemia, noted after start of treatment to 300s  -Patient started on 1000 mg metformin daily by PCP, BS still over 300 today, patient had eaten.  -Continue to monitor; was 400 today, patient says she has follow-up with PCP coming      Thank you very much for providing the opportunity to participate in this patient’s care. Please do not hesitate to call if there are any other questions.

## 2024-11-22 ENCOUNTER — HOSPITAL ENCOUNTER (OUTPATIENT)
Dept: RADIATION ONCOLOGY | Facility: HOSPITAL | Age: 64
Discharge: HOME | End: 2024-11-22
Payer: COMMERCIAL

## 2024-11-22 ENCOUNTER — RADIATION ONCOLOGY WEEKLY ASSESSMENT (OUTPATIENT)
Dept: RADIATION ONCOLOGY | Facility: HOSPITAL | Age: 64
End: 2024-11-22
Payer: COMMERCIAL

## 2024-11-22 DIAGNOSIS — C34.11 PRIMARY ADENOCARCINOMA OF UPPER LOBE OF RIGHT LUNG: Primary | ICD-10-CM

## 2024-11-22 DIAGNOSIS — C34.11 MALIGNANT NEOPLASM OF UPPER LOBE OF RIGHT LUNG: Primary | ICD-10-CM

## 2024-11-22 LAB
RAD ONC ARIA COURSE ID: NORMAL
RAD ONC ARIA COURSE INTENT: NORMAL
RAD ONC ARIA COURSE LAST TREATMENT DATE: NORMAL
RAD ONC ARIA COURSE START DATE: NORMAL
RAD ONC ARIA COURSE TREATMENT ELAPSED DAYS: 39
RAD ONC ARIA FIRST TREATMENT DATE: NORMAL
RAD ONC ARIA PLAN FRACTIONS TREATED TO DATE: 8
RAD ONC ARIA PLAN ID: NORMAL
RAD ONC ARIA PLAN PRESCRIBED DOSE PER FRACTION: 2 GY
RAD ONC ARIA PLAN PRIMARY REFERENCE POINT: NORMAL
RAD ONC ARIA PLAN TOTAL FRACTIONS PRESCRIBED: 8
RAD ONC ARIA PLAN TOTAL PRESCRIBED DOSE: 1600 CGY
RAD ONC ARIA REFERENCE POINT DOSAGE GIVEN TO DATE: 60 GY
RAD ONC ARIA REFERENCE POINT ID: NORMAL
RAD ONC ARIA REFERENCE POINT SESSION DOSAGE GIVEN: 2 GY

## 2024-11-22 PROCEDURE — 77014 CHG CT GUIDANCE RADIATION THERAPY FLDS PLACEMENT: CPT | Performed by: INTERNAL MEDICINE

## 2024-11-22 PROCEDURE — 77386: CPT | Performed by: INTERNAL MEDICINE

## 2024-11-22 NOTE — PROGRESS NOTES
RADIATION THERAPY COMPLETION and DISCHARGE     Laura Higginbotham completed radiation therapy on 11/22/2024 for Primary adenocarcinoma of upper lobe of right lung [C34.11]. The summary of her treatment is as follows:    TREATMENT SITE:   [facility] START DATE:   10/14/2024   TOTAL DOSE (cGy):   6000 END DATE:   11/22/2024    DOSE/FRACTION:   200 ELAPSED DAYS:   38   TOTAL FACTIONS:   30 PHYSICIAN:    Dr. Ivan Dasilva     Laura is scheduled for a three-month follow-up appointment with Dr. Ivan Dasilva on 02/27/2025 at 9:00 am.  _______________________________________________________________________    The following instructions were provided to the patient and/or family in their printed after visit summary (AVS) as well as discussed in-person by the radiation oncology nurse or medical assistant. The patient and/or family had the opportunity to ask questions and verbalized their questions were adequately answered. Encouraged patient to contact our department with any questions or concerns.    RADIATION THERAPY DISCHARGE INSTRUCTIONS  Chest    CONGRATULATIONS! You completed 30 radiation treatments for treatment of your right lung cancer. These discharge instructions are important for you to follow until your one-month follow up appointment with your radiation oncologist. Please make sure to review these instructions and call the Radiation Oncology Department if you have any questions or concerns with symptoms you may experience. Thank you for trusting us with your cancer treatment!    DIET  Eat a regular, well balanced diet that is high in protein such as meat, eggs, cheese, and nut butters.  Drink 48 to 64 ounces of fluid daily.  Use nutritional supplements if you are not able to eat full meals.  Monitor your weight and report continued weight loss to your doctor.    MEDICATIONS  Use Tylenol as needed to decrease discomfort and irritation to treatment area.  Take pain medications only as prescribed.  Take a laxative or stool  softener as needed to prevent constipation due to pain medications.  (If applicable) Use Tanya's Magic Mouthwash or other oral pain relief medication before meals and before taking medications as needed to ease the discomfort of swallowing.    SKIN CARE  Wash treated skin gently with your hands using a mild, non-drying soap such as Dove® or Aveeno® until skin returns to normal.  Pat skin dry - do not rub.  Keep treated skin moist with twice daily applications of Eucerin® or Aquaphor®.  Always protect your treated skin when outdoors by wearing protective clothing and applying sunscreen SPF 15 or higher at least 30 minutes before going outdoors and reapply frequently.    ACTIVITY  Fatigue is a normal side effect of radiation therapy and should improve over time.  Alternate rest and activity.  Exercise such as walking may help to improve your fatigue.    FOLLOW-UP  Continue follow-up appointments with all other doctors as necessary.  Call your radiation oncology doctor if you are concerned with any side effects you are experiencing.    WHEN TO CALL YOUR RADIATION ONCOLOGIST OR NURSE: [phone]  You have a fever of 100.4 OF or higher.  You have chills.  Your pain or discomfort is getting worse.  Your skin in the treatment area is getting more red or swollen, feels hard or hot, has a rash or blisters, and/or is itchy.  You see drainage (liquid) coming from your skin in the treatment area.  You see any new open areas (wounds) or changes to your skin.  You have any questions or concerns.  _______________________________________________________________________    Completed by: Jada Norris MA, Radiation Oncology Medical Assistant on 11/22/24 at 09:12 EST

## 2024-11-22 NOTE — PATIENT INSTRUCTIONS
RADIATION THERAPY DISCHARGE INSTRUCTIONS  Chest    CONGRATULATIONS! You completed 30 radiation treatments for treatment of your right lung cancer. These discharge instructions are important for you to follow until your one-month follow up appointment with your radiation oncologist. Please make sure to review these instructions and call the Radiation Oncology Department if you have any questions or concerns with symptoms you may experience. Thank you for trusting us with your cancer treatment!    DIET  Eat a regular, well balanced diet that is high in protein such as meat, eggs, cheese, and nut butters.  Drink 48 to 64 ounces of fluid daily.  Use nutritional supplements if you are not able to eat full meals.  Monitor your weight and report continued weight loss to your doctor.    MEDICATIONS  Use Tylenol as needed to decrease discomfort and irritation to treatment area.  Take pain medications only as prescribed.  Take a laxative or stool softener as needed to prevent constipation due to pain medications.  (If applicable) Use Tanya's Magic Mouthwash or other oral pain relief medication before meals and before taking medications as needed to ease the discomfort of swallowing.    SKIN CARE  Wash treated skin gently with your hands using a mild, non-drying soap such as Dove® or Aveeno® until skin returns to normal.  Pat skin dry - do not rub.  Keep treated skin moist with twice daily applications of Eucerin® or Aquaphor®.  Always protect your treated skin when outdoors by wearing protective clothing and applying sunscreen SPF 15 or higher at least 30 minutes before going outdoors and reapply frequently.    ACTIVITY  Fatigue is a normal side effect of radiation therapy and should improve over time.  Alternate rest and activity.  Exercise such as walking may help to improve your fatigue.    FOLLOW-UP  Continue follow-up appointments with all other doctors as necessary.  Call your radiation oncology doctor if you are  concerned with any side effects you are experiencing.    WHEN TO CALL YOUR RADIATION ONCOLOGIST OR NURSE: [phone]  You have a fever of 100.4 OF or higher.  You have chills.  Your pain or discomfort is getting worse.  Your skin in the treatment area is getting more red or swollen, feels hard or hot, has a rash or blisters, and/or is itchy.  You see drainage (liquid) coming from your skin in the treatment area.  You see any new open areas (wounds) or changes to your skin.  You have any questions or concerns.    _______________________________________________________________________    Completed by: Jada Norris MA on 11/22/2024 at 09:11 EST

## 2024-11-27 LAB
RAD ONC ARIA COURSE END DATE: NORMAL
RAD ONC ARIA COURSE ID: NORMAL
RAD ONC ARIA COURSE INTENT: NORMAL
RAD ONC ARIA COURSE LAST TREATMENT DATE: NORMAL
RAD ONC ARIA COURSE START DATE: NORMAL
RAD ONC ARIA COURSE TREATMENT ELAPSED DAYS: 39
RAD ONC ARIA FIRST TREATMENT DATE: NORMAL
RAD ONC ARIA PLAN FRACTIONS TREATED TO DATE: 22
RAD ONC ARIA PLAN FRACTIONS TREATED TO DATE: 8
RAD ONC ARIA PLAN ID: NORMAL
RAD ONC ARIA PLAN ID: NORMAL
RAD ONC ARIA PLAN NAME: NORMAL
RAD ONC ARIA PLAN NAME: NORMAL
RAD ONC ARIA PLAN PRESCRIBED DOSE PER FRACTION: 2 GY
RAD ONC ARIA PLAN PRESCRIBED DOSE PER FRACTION: 2 GY
RAD ONC ARIA PLAN PRIMARY REFERENCE POINT: NORMAL
RAD ONC ARIA PLAN PRIMARY REFERENCE POINT: NORMAL
RAD ONC ARIA PLAN TOTAL FRACTIONS PRESCRIBED: 30
RAD ONC ARIA PLAN TOTAL FRACTIONS PRESCRIBED: 8
RAD ONC ARIA PLAN TOTAL PRESCRIBED DOSE: 1600 CGY
RAD ONC ARIA PLAN TOTAL PRESCRIBED DOSE: 6000 CGY
RAD ONC ARIA REFERENCE POINT DOSAGE GIVEN TO DATE: 60 GY
RAD ONC ARIA REFERENCE POINT ID: NORMAL

## 2024-12-02 ENCOUNTER — HOSPITAL ENCOUNTER (OUTPATIENT)
Dept: ONCOLOGY | Facility: HOSPITAL | Age: 64
Discharge: HOME | End: 2024-12-02
Payer: MEDICAID

## 2024-12-02 ENCOUNTER — OFFICE VISIT (OUTPATIENT)
Dept: ONCOLOGY | Facility: CLINIC | Age: 64
End: 2024-12-02
Payer: MEDICAID

## 2024-12-02 VITALS
RESPIRATION RATE: 14 BRPM | OXYGEN SATURATION: 99 % | TEMPERATURE: 97.7 F | SYSTOLIC BLOOD PRESSURE: 107 MMHG | DIASTOLIC BLOOD PRESSURE: 70 MMHG | WEIGHT: 159 LBS | BODY MASS INDEX: 24.1 KG/M2 | HEART RATE: 89 BPM | HEIGHT: 68 IN

## 2024-12-02 VITALS
WEIGHT: 159.2 LBS | HEART RATE: 96 BPM | TEMPERATURE: 98.4 F | OXYGEN SATURATION: 99 % | DIASTOLIC BLOOD PRESSURE: 77 MMHG | SYSTOLIC BLOOD PRESSURE: 123 MMHG | HEIGHT: 68 IN | BODY MASS INDEX: 24.13 KG/M2

## 2024-12-02 DIAGNOSIS — C34.90 NON-SMALL CELL LUNG CANCER METASTATIC TO INTRATHORACIC LYMPH NODE: ICD-10-CM

## 2024-12-02 DIAGNOSIS — C34.11 MALIGNANT NEOPLASM OF UPPER LOBE OF RIGHT LUNG: Primary | ICD-10-CM

## 2024-12-02 DIAGNOSIS — C77.1 NON-SMALL CELL LUNG CANCER METASTATIC TO INTRATHORACIC LYMPH NODE: ICD-10-CM

## 2024-12-02 DIAGNOSIS — C34.11 MALIGNANT NEOPLASM OF UPPER LOBE OF RIGHT LUNG: ICD-10-CM

## 2024-12-02 DIAGNOSIS — T17.308A CHOKING, INITIAL ENCOUNTER: ICD-10-CM

## 2024-12-02 DIAGNOSIS — C77.1 NON-SMALL CELL LUNG CANCER METASTATIC TO INTRATHORACIC LYMPH NODE: Primary | ICD-10-CM

## 2024-12-02 DIAGNOSIS — C34.90 NON-SMALL CELL LUNG CANCER METASTATIC TO INTRATHORACIC LYMPH NODE: Primary | ICD-10-CM

## 2024-12-02 LAB
ALBUMIN SERPL-MCNC: 3.7 G/DL (ref 3.5–5.2)
ALBUMIN/GLOB SERPL: 1.1 G/DL
ALP SERPL-CCNC: 106 U/L (ref 39–117)
ALT SERPL W P-5'-P-CCNC: 15 U/L (ref 1–33)
ANION GAP SERPL CALCULATED.3IONS-SCNC: 13.2 MMOL/L (ref 5–15)
AST SERPL-CCNC: 15 U/L (ref 1–32)
BASOPHILS # BLD AUTO: 0.02 10*3/MM3 (ref 0–0.2)
BASOPHILS NFR BLD AUTO: 0.4 % (ref 0–1.5)
BILIRUB SERPL-MCNC: 0.2 MG/DL (ref 0–1.2)
BUN SERPL-MCNC: 12 MG/DL (ref 8–23)
BUN/CREAT SERPL: 15.4 (ref 7–25)
CALCIUM SPEC-SCNC: 9.8 MG/DL (ref 8.6–10.5)
CHLORIDE SERPL-SCNC: 97 MMOL/L (ref 98–107)
CO2 SERPL-SCNC: 21.8 MMOL/L (ref 22–29)
CREAT SERPL-MCNC: 0.78 MG/DL (ref 0.57–1)
DEPRECATED RDW RBC AUTO: 49 FL (ref 37–54)
EGFRCR SERPLBLD CKD-EPI 2021: 84.9 ML/MIN/1.73
EOSINOPHIL # BLD AUTO: 0 10*3/MM3 (ref 0–0.4)
EOSINOPHIL NFR BLD AUTO: 0 % (ref 0.3–6.2)
ERYTHROCYTE [DISTWIDTH] IN BLOOD BY AUTOMATED COUNT: 15.6 % (ref 12.3–15.4)
GLOBULIN UR ELPH-MCNC: 3.3 GM/DL
GLUCOSE SERPL-MCNC: 406 MG/DL (ref 65–99)
HCT VFR BLD AUTO: 33 % (ref 34–46.6)
HGB BLD-MCNC: 10.9 G/DL (ref 12–15.9)
LYMPHOCYTES # BLD AUTO: 0.33 10*3/MM3 (ref 0.7–3.1)
LYMPHOCYTES NFR BLD AUTO: 6.1 % (ref 19.6–45.3)
MAGNESIUM SERPL-MCNC: 1.9 MG/DL (ref 1.6–2.4)
MCH RBC QN AUTO: 29.1 PG (ref 26.6–33)
MCHC RBC AUTO-ENTMCNC: 33 G/DL (ref 31.5–35.7)
MCV RBC AUTO: 88 FL (ref 79–97)
MONOCYTES # BLD AUTO: 0.51 10*3/MM3 (ref 0.1–0.9)
MONOCYTES NFR BLD AUTO: 9.5 % (ref 5–12)
NEUTROPHILS NFR BLD AUTO: 4.52 10*3/MM3 (ref 1.7–7)
NEUTROPHILS NFR BLD AUTO: 84 % (ref 42.7–76)
PLATELET # BLD AUTO: 334 10*3/MM3 (ref 140–450)
PMV BLD AUTO: 8.2 FL (ref 6–12)
POTASSIUM SERPL-SCNC: 4.6 MMOL/L (ref 3.5–5.2)
PROT SERPL-MCNC: 7 G/DL (ref 6–8.5)
RBC # BLD AUTO: 3.75 10*6/MM3 (ref 3.77–5.28)
SODIUM SERPL-SCNC: 132 MMOL/L (ref 136–145)
WBC NRBC COR # BLD AUTO: 5.38 10*3/MM3 (ref 3.4–10.8)

## 2024-12-02 PROCEDURE — 1126F AMNT PAIN NOTED NONE PRSNT: CPT | Performed by: INTERNAL MEDICINE

## 2024-12-02 PROCEDURE — 25010000002 MAGNESIUM SULFATE PER 500 MG OF MAGNESIUM: Performed by: INTERNAL MEDICINE

## 2024-12-02 PROCEDURE — 96375 TX/PRO/DX INJ NEW DRUG ADDON: CPT

## 2024-12-02 PROCEDURE — 96415 CHEMO IV INFUSION ADDL HR: CPT

## 2024-12-02 PROCEDURE — 25010000002 PALONOSETRON 0.25 MG/5ML SOLUTION PREFILLED SYRINGE: Performed by: INTERNAL MEDICINE

## 2024-12-02 PROCEDURE — 80053 COMPREHEN METABOLIC PANEL: CPT | Performed by: INTERNAL MEDICINE

## 2024-12-02 PROCEDURE — 25810000003 SODIUM CHLORIDE 0.9 % SOLUTION 500 ML FLEX CONT: Performed by: INTERNAL MEDICINE

## 2024-12-02 PROCEDURE — 36593 DECLOT VASCULAR DEVICE: CPT

## 2024-12-02 PROCEDURE — 25010000002 HEPARIN LOCK FLUSH PER 10 UNITS: Performed by: INTERNAL MEDICINE

## 2024-12-02 PROCEDURE — 25810000003 SODIUM CHLORIDE 0.9 % SOLUTION 1,000 ML FLEX CONT: Performed by: INTERNAL MEDICINE

## 2024-12-02 PROCEDURE — 96372 THER/PROPH/DIAG INJ SC/IM: CPT

## 2024-12-02 PROCEDURE — 83735 ASSAY OF MAGNESIUM: CPT | Performed by: INTERNAL MEDICINE

## 2024-12-02 PROCEDURE — 96417 CHEMO IV INFUS EACH ADDL SEQ: CPT

## 2024-12-02 PROCEDURE — 96367 TX/PROPH/DG ADDL SEQ IV INF: CPT

## 2024-12-02 PROCEDURE — 96413 CHEMO IV INFUSION 1 HR: CPT

## 2024-12-02 PROCEDURE — 96366 THER/PROPH/DIAG IV INF ADDON: CPT

## 2024-12-02 PROCEDURE — 85025 COMPLETE CBC W/AUTO DIFF WBC: CPT | Performed by: INTERNAL MEDICINE

## 2024-12-02 PROCEDURE — 96368 THER/DIAG CONCURRENT INF: CPT

## 2024-12-02 PROCEDURE — 25010000002 POTASSIUM CHLORIDE PER 2 MEQ OF POTASSIUM: Performed by: INTERNAL MEDICINE

## 2024-12-02 PROCEDURE — 25010000002 ALTEPLASE 2 MG RECONSTITUTED SOLUTION: Performed by: NURSE PRACTITIONER

## 2024-12-02 PROCEDURE — 25010000002 FOSAPREPITANT PER 1 MG: Performed by: INTERNAL MEDICINE

## 2024-12-02 PROCEDURE — 99214 OFFICE O/P EST MOD 30 MIN: CPT | Performed by: INTERNAL MEDICINE

## 2024-12-02 PROCEDURE — 25810000003 SODIUM CHLORIDE 0.9 % SOLUTION: Performed by: INTERNAL MEDICINE

## 2024-12-02 PROCEDURE — 25010000002 CYANOCOBALAMIN PER 1000 MCG: Performed by: INTERNAL MEDICINE

## 2024-12-02 PROCEDURE — 25010000002 CISPLATIN PER 50 MG: Performed by: INTERNAL MEDICINE

## 2024-12-02 PROCEDURE — 25010000002 PEMETREXED PER 10 MG: Performed by: INTERNAL MEDICINE

## 2024-12-02 RX ORDER — HEPARIN SODIUM (PORCINE) LOCK FLUSH IV SOLN 100 UNIT/ML 100 UNIT/ML
500 SOLUTION INTRAVENOUS AS NEEDED
OUTPATIENT
Start: 2024-12-02

## 2024-12-02 RX ORDER — HEPARIN SODIUM (PORCINE) LOCK FLUSH IV SOLN 100 UNIT/ML 100 UNIT/ML
500 SOLUTION INTRAVENOUS AS NEEDED
Status: DISCONTINUED | OUTPATIENT
Start: 2024-12-02 | End: 2024-12-03 | Stop reason: HOSPADM

## 2024-12-02 RX ORDER — SODIUM CHLORIDE 9 MG/ML
20 INJECTION, SOLUTION INTRAVENOUS ONCE
Status: DISCONTINUED | OUTPATIENT
Start: 2024-12-02 | End: 2024-12-03 | Stop reason: HOSPADM

## 2024-12-02 RX ORDER — PALONOSETRON 0.05 MG/ML
0.25 INJECTION, SOLUTION INTRAVENOUS ONCE
Status: COMPLETED | OUTPATIENT
Start: 2024-12-02 | End: 2024-12-02

## 2024-12-02 RX ORDER — PALONOSETRON 0.05 MG/ML
0.25 INJECTION, SOLUTION INTRAVENOUS ONCE
Status: CANCELLED | OUTPATIENT
Start: 2024-12-02

## 2024-12-02 RX ORDER — CYANOCOBALAMIN 1000 UG/ML
1000 INJECTION, SOLUTION INTRAMUSCULAR; SUBCUTANEOUS ONCE
Status: COMPLETED | OUTPATIENT
Start: 2024-12-02 | End: 2024-12-02

## 2024-12-02 RX ORDER — SODIUM CHLORIDE 0.9 % (FLUSH) 0.9 %
10 SYRINGE (ML) INJECTION AS NEEDED
Status: DISCONTINUED | OUTPATIENT
Start: 2024-12-02 | End: 2024-12-03 | Stop reason: HOSPADM

## 2024-12-02 RX ORDER — SODIUM CHLORIDE 0.9 % (FLUSH) 0.9 %
10 SYRINGE (ML) INJECTION AS NEEDED
OUTPATIENT
Start: 2024-12-02

## 2024-12-02 RX ORDER — CYANOCOBALAMIN 1000 UG/ML
1000 INJECTION, SOLUTION INTRAMUSCULAR; SUBCUTANEOUS ONCE
Status: CANCELLED | OUTPATIENT
Start: 2024-12-02 | End: 2024-12-02

## 2024-12-02 RX ORDER — SODIUM CHLORIDE 9 MG/ML
20 INJECTION, SOLUTION INTRAVENOUS ONCE
Status: CANCELLED | OUTPATIENT
Start: 2024-12-02

## 2024-12-02 RX ADMIN — CISPLATIN 144 MG: 1 INJECTION INTRAVENOUS at 13:13

## 2024-12-02 RX ADMIN — ALTEPLASE: 2.2 INJECTION, POWDER, LYOPHILIZED, FOR SOLUTION INTRAVENOUS at 08:44

## 2024-12-02 RX ADMIN — FOSAPREPITANT 100 ML: 150 INJECTION, POWDER, LYOPHILIZED, FOR SOLUTION INTRAVENOUS at 12:13

## 2024-12-02 RX ADMIN — POTASSIUM CHLORIDE 1000 ML: 2 INJECTION, SOLUTION, CONCENTRATE INTRAVENOUS at 10:23

## 2024-12-02 RX ADMIN — POTASSIUM CHLORIDE: 2 INJECTION, SOLUTION, CONCENTRATE INTRAVENOUS at 14:27

## 2024-12-02 RX ADMIN — PEMETREXED DISODIUM 960 MG: 500 INJECTION, POWDER, LYOPHILIZED, FOR SOLUTION INTRAVENOUS at 12:47

## 2024-12-02 RX ADMIN — HEPARIN 500 UNITS: 100 SYRINGE at 16:32

## 2024-12-02 RX ADMIN — CYANOCOBALAMIN 1000 MCG: 1000 INJECTION, SOLUTION INTRAMUSCULAR at 12:16

## 2024-12-02 RX ADMIN — PALONOSETRON 0.25 MG: 0.25 INJECTION, SOLUTION INTRAVENOUS at 12:10

## 2024-12-02 RX ADMIN — Medication 10 ML: at 16:32

## 2024-12-02 NOTE — PROGRESS NOTES
0815 Port accessed and flushed with sluggish blood return noted.  Port flushed with saline and capped.  0816 peripheral labs, cbc,cmp, mg drawn from left wrist.  0844 Activase instilled into port.

## 2024-12-02 NOTE — PROGRESS NOTES
Radiation Treatment Summary Note      Patient Name: Laura Higginbotham  : 1960    Attending Provider: No care team member to display      Diagnosis:     ICD-10-CM ICD-9-CM   1. Malignant neoplasm of upper lobe of right lung  C34.11 162.3       Radiation Start Date: 10/14/2024    Radiation Completion Date: 2024      Prescription:     Site: PTV RUL  Laterality: Right  Total Dose: 6000 cGy  Dose per Fraction: 200 cGy  Total Fractions: 30  Daily or BID: Daily  Modality: Photon  Technique: IMRT/VMAT/Rapid Arc  Bolus: No    Final Delivered Dose Deviated From Initially Prescribed Dose: No    Concurrent Chemotherapy: Yes    Patient Tolerated Treatment Without Unexpected Side Effects/Complications: Yes, patient did have symptoms of esophagitis towards the end of treatment requiring pain medicine    ECOG: Restricted in physically strenuous activity but ambulatory and able to carry out work of a light or sedentary nature, e.g., light house work, office work = 1    Pain Management Plan: Opioid or other pain medication prescribed, managed by me    Follow-Up Plan: 4-6 weeks    Imaging Ordered for Follow-Up: Yes, describe: CT Chest in 3 months         Ivan Dasilva MD

## 2024-12-02 NOTE — PATIENT INSTRUCTIONS
Continue with chemotherapy today; RTC 12/30 with labs with port flush for C4 (didn't want to be sick/vomiting for Candido)

## 2024-12-02 NOTE — PROGRESS NOTES
Pt. Is here at clinic for C3D1 Alimta, Cisplatin, and MD f/u appointment.   Pt. Was brought back to infusion area after her f/u appt. With Dr. Huynh.   Pt. Has no complaints today and all lab results reviewed. Pt's glucose level 406 and MD notified of lab results.   OK to continue with treatment per Dr. Huynh.   Treatment given as ordered and pt. Tolerated well.   Pt. Discharged from clinic with no complaints, no AVS given.   Message sent to scheduling pool for pt's next f/u appointment and chemo schedule.

## 2024-12-09 ENCOUNTER — TELEPHONE (OUTPATIENT)
Dept: ONCOLOGY | Facility: CLINIC | Age: 64
End: 2024-12-09
Payer: MEDICAID

## 2024-12-09 NOTE — TELEPHONE ENCOUNTER
Spoke w/ Mariajose w/ [facility] and she states that pt's CT scan has been denied due to it needing to be scheduled at a free standing facility.  I asked if [facility] would be an option.  She states that it is an approved facility on the [facility] list.  Spoke w/ daniel and let her know that she will need to call [facility] and schedule her scan there and that I will fax the order over.  She v/u.  Order faxed and confirmation received.

## 2024-12-16 DIAGNOSIS — C34.90 NON-SMALL CELL LUNG CANCER METASTATIC TO INTRATHORACIC LYMPH NODE: ICD-10-CM

## 2024-12-16 DIAGNOSIS — C34.11 PRIMARY ADENOCARCINOMA OF UPPER LOBE OF RIGHT LUNG: ICD-10-CM

## 2024-12-16 DIAGNOSIS — C77.1 NON-SMALL CELL LUNG CANCER METASTATIC TO INTRATHORACIC LYMPH NODE: ICD-10-CM

## 2024-12-16 RX ORDER — OXYCODONE HCL 5 MG/5 ML
7.5 SOLUTION, ORAL ORAL EVERY 4 HOURS PRN
Qty: 300 ML | Refills: 0 | Status: SHIPPED | OUTPATIENT
Start: 2024-12-16

## 2024-12-18 NOTE — PROGRESS NOTES
HEMATOLOGY ONCOLOGY OUTPATIENT FOLLOW UP       Patient name: Laura Higginbotham  : 1960  MRN: [id number]  Primary Care Physician: Laura Archer  Referring Physician: Laura Archer  Reason For Consult: right hilar adenocarcinoma of the lung    History of Present Illness:  Patient is a 64 y.o. PMH of smoking, skin cancer, arthritis, GERD who presents for newly diagnosed lung cancer.    -2024 Pt went to Dr Archer for increased nonproductive coughing and feeling more dysnpeic on exertion. Was dx with pleurisy and given prednisone which helped somewhat.  -2024 Since cough hadn't completely gone away in 6 weeks, patient went back to Dr. Archer who became concerned and ordered a CXR and antibiotics.  -CXR showed a right hilar mass, CT was ordered.    -2024 CT chest at [facility]: Revealed a very large right hilar mass measuring 9.2 x 9.5 cm in diameter with hypodensity centrally suggesting a necrotic center.  There is obstruction of the right upper lobe bronchus with the infiltrate seen extending peripheral to that area and marked elevation of the right hilum.  Mass extends into the right hilar lymph nodes.    Patient was seen in consultation by Dr. Crystal Powell, pulmonology. She denied having a cough, hemoptysis, wheezing, dyspnea, fever, unintentional weight loss.    -2020 for EBUS bronchoscopy by Dr. Crystal Powell with FNA to the right hilar node 10R and to the right hilar mass revealing inés revealing malignant cells favoring non-small cell.    Pathology showed in the right hilar mass IHC positive for CK7, TTF-1, and Napsin, negative for p63 and CK/6 consistent with pulmonary adenocarcinoma negative for CD56, chromogranin, synaptophysin excluding neuroendocrine component.  Per discussion with pathology: Passes from the right hilar mass were found to be acellular, viable tissue was from the lymph node biopsied and 1 cassette is available for future testing.  -Pneumocystis was negative,  respiratory  culture showing rare growth of normal respiratory alexi with no S. aureus or Pseudomonas aeruginosa detected.,  Respiratory panel PCR negative, PRELIM: no acid-fast bacilli seen on concentrated smear at 1 week, No isolated fungus at 1 week,.  -8/30/2024 CBC showed WBC 11.53, hemoglobin 13.1, hematocrit 40.7 with essentially normal indices, platelets 332K    -9/12/2024 Patient presented for initial consultation with her niece reporting constant nonproductive cough, she still denies having hemoptysis, wheezing, dyspnea, fever, or unintentional weight loss, new bony pains, no new H/A or focal neuro deficit except right under arm new tingling/burning.  She has a history of smoking 1 PPD- started at 17, this summer cut down to 1/2 PPD mostly because of the coughing (47+ years).    -9/20/2024 MRI brain with without contrast with no acute intracranial process identified, findings suggestive of minimal chronic small vessel ischemic disease but no evidence of intracranial metastasis    -9/23/2025 [facility]is shows PD-L1 positive with TPS 80% by PDL1 test 22C3, with level 1 evidence for cemiplimab and pembrolizumab, positive by PD-L1 28-8 with level 1 evidence for nivolumab/ipilimumab combination, positive by PD-L1  with TC 1+, 50% with atezolizumab in the metastatic setting level 1 evidence, and PD-L1 positive and  positive TC 1+60% with a benefit of atezolizumab in the adjuvant setting, and cemiplimab level 1 evidence.  -ALK negative/fusion not detected by RNA in the tumor, BRAF mutation not detected, EGFR mutation not detected, MET variant transcript not detected, RET fusion not detected, ROS1 fusion not detected.  Tumor mutation burden was high at 18, microsatellite stable.  KRAS pG12V found and 61%, TP53 lY293F found in 56%, NFKBIA was amplified    -9/25/2024 NM PET/CT skull initial staging: Heterogeneous right upper lobe lung mass extending into the superior right lower lobe, encasing the right mainstem bronchus,  encasing and narrowing the right upper lobe bronchus, is hypermetabolic (mSUV 8.76), consistent with malignancy. It has heterogeneous areas of photopenia, suggesting internal necrosis. Pre-carinal lymph node measuring 14 mm short axis is FDG avid (mSUV 4.0), consistent with malignancy.  No hybrid avidity in the neck, abdomen, pelvis, or skeleton. No left hilar adenopathy. No suspicious left lung nodules. Normal heart size with coronary calcifications. Benign calcified mediastinal and left hilar lymph nodes are present.    -10/2/2024 Patient presents in follow up with her niece reporting better nonproductive cough and rib pain since being oxycodone low dose, she still denies having hemoptysis, wheezing, dyspnea, fever, or unintentional weight loss, new bony pains, no new H/A or focal neuro deficit except right under arm new tingling/burning. Got SIMMED yesterday for radiation.  She has a history of smoking 1 PPD- started at 17, still on 1/2 PPD - gum stuck to dentures.    -10/3/2024 B12 1000 mcg given IM  -10/10 s/p Mediport placement    -10/14/2024 started chemoradiation (cisplatin +pemetrexed) reporting still with nonproductive cough and rib pain since being oxycodone low dose. Picked up her nausea medications, got her port,  her Emla cream (ports still a little tender), started her folate soon as she got it.  Did get her chemo teach and got her B12 shot as well she is ready to go.    -11/4/2024 Follow up for C2 chemoradiation reporting nonproductive cough and rib pain both better since being oxycodone low dose, using stool softener and yogurt which has greatly helped with stooling appropriately (EOD), patient had a lot of nausea the first 9 days, needing the zofran Q8H that time, a lot of fatigue.  -11/22/2024.  Patient completed radiation therapy to a total dose of 60 Gray (2 Gray per fraction with total of 30 fractions (under Dr. Ivan Dasilva).    -12/2/2024 Follow up for C3 chemotherapy, completed  radiation last week.  She is reporting more productive cough (mostly clear and thick occ some green); reports when she is drinking warm liquids or cold liquids he is still having pain when she tries to swallow the temperature seems to go down her throat where she chokes a little bit and then seems to radiate over to the upper right side of her chest this is relatively new.  Denies having any fevers, chills, pleuritic chest pain, shortness of breath in fact breathing seems to be easier.  She feels wiped out and rib pain better since being oxycodone low dose, using stool softener and yogurt which has greatly helped with stooling appropriately (EOD), patient still had a lot of nausea the first 9 days, even with addition of Zyprexa, needing the zofran Q8H that time, and admits that she is still taking the dexamethasone as prescribed day before, day of, day after and those are proximal to starting day of.      Subjective:  -Reviewed the report of the CT chest done at [facility] a few days ago shows some improvement of the size of her tumor and no concern of PNA or TE fistula.     -12/19/2024 Patient presents in follow up to ensure she will be ready for C4 chemotherapy on 12/31.  She is reporting the farther she gets from radiation, the more improvement she has in the productive cough (mostly clear and thick occ some green); reports also some improvement in the sensation of when she is drinking warm liquids or cold liquids not having as frequent or as severe pain when she tries to swallow the temperature seems to go down her throat, nor is she choking as much or radiating- over to the upper right side of her chest.  Denies having any fevers, chills, pleuritic chest pain, shortness of breath in fact breathing seems to be easier.  Her energy is a littler better, and rib pain better since completion of radiation;   -medicare finally filling the next oxycodone low dose Rx ordered by Dr Dasilva (also has Narcan)  -using stool  softener and yogurt which has greatly helped with stooling appropriately (EOD)  -patient still had a lot of nausea the first 9 days, even with addition of Zyprexa (needs refill for C4), needing the zofran Q8H that time,   -still taking the dexamethasone as prescribed day before, day of, day after and those are proximal to starting day of (needs refill for C4).      Past Medical History:   Diagnosis Date    Arthritis     Cancer     skin cancer on leg right    GERD (gastroesophageal reflux disease)        Past Surgical History:   Procedure Laterality Date    BRONCHOSCOPY N/A 2024    Procedure: BRONCHOSCOPY WITH BRONCHIAL WASHING ,ENDOBRONCHIAL ULTRASOUND WITH FINE NEEDLE ASPIRATION X2 AREAS;  Surgeon: Crystal Powell MD;  Location: [facility] ENDOSCOPY;  Service: Pulmonary;  Laterality: N/A;     SECTION      x3    KNEE SURGERY Left     ORIF, screws and plates, has had Operation on it x 3       Current Outpatient Medications:     Breztri Aerosphere 160-9-4.8 MCG/ACT aerosol inhaler, Inhale 2 puffs 2 (Two) Times a Day., Disp: , Rfl:     dexAMETHasone (DECADRON) 4 MG tablet, Take 1 tablet twice a day for 3 consecutive days beginning day before chemo, Disp: 8 tablet, Rfl: 0    famotidine (PEPCID) 20 MG tablet, Take 1 tablet by mouth 2 (Two) Times a Day., Disp: 30 tablet, Rfl: 2    folic acid (Folate) 400 MCG tablet, Take 1 tablet by mouth Daily for 270 days., Disp: 90 tablet, Rfl: 2    gabapentin (NEURONTIN) 100 MG capsule, Take 1 capsule by mouth 2 (Two) Times a Day., Disp: , Rfl:     Glucose Blood (Blood Glucose Test Strips 333) strip, Check sugar twice a day and PRN and keep a log In Vitro, Disp: , Rfl:     lidocaine-prilocaine (EMLA) 2.5-2.5 % cream, Apply 1 Application topically to the appropriate area as directed Every 2 (Two) Hours As Needed for Mild Pain. To port site before chemo/blood draw, Disp: 30 g, Rfl: 2    metFORMIN (GLUCOPHAGE) 1000 MG tablet, Every 12 (Twelve) Hours., Disp: , Rfl:     naloxone  (NARCAN) 4 MG/0.1ML nasal spray, Call 911. Don't prime. [place] in 1 nostril for overdose. Repeat in 2-3 minutes in other nostril if no or minimal breathing/responsiveness., Disp: 2 each, Rfl: 0    NP Thyroid 30 MG tablet, TAKE 1 TABLET BY MOUTH EVERY DAY ON EMPTY STOMACH FOR 30 DAYS, Disp: , Rfl:     Nystatin (magic mouthwash), Swish and swallow 5 mL 4 (Four) Times a Day Before Meals & at Bedtime., Disp: 1 bottle, Rfl: 2    OLANZapine (zyPREXA) 5 MG tablet, Take one tablet every night for four nights starting the night of chemotherapy, Disp: 4 tablet, Rfl: 0    ondansetron ODT (ZOFRAN-ODT) 8 MG disintegrating tablet, Take 1 tablet by mouth Every 8 (Eight) Hours As Needed for Nausea or Vomiting., Disp: 45 tablet, Rfl: 3    oxyCODONE (ROXICODONE) 5 MG/5ML solution, Take 7.5 mL by mouth Every 4 (Four) Hours As Needed for Moderate Pain., Disp: 300 mL, Rfl: 0    prochlorperazine (COMPAZINE) 10 MG tablet, Take 1 tablet by mouth Every 6 (Six) Hours As Needed for Nausea or Vomiting., Disp: 60 tablet, Rfl: 1    albuterol sulfate  (90 Base) MCG/ACT inhaler, Inhale 1 puff Every 4 (Four) Hours As Needed for Wheezing. (Patient not taking: Reported on 12/19/2024), Disp: , Rfl:     predniSONE (DELTASONE) 10 MG tablet, Take 1 tablet by mouth 2 (Two) Times a Day. X 30 days (Patient not taking: Reported on 12/19/2024), Disp: , Rfl:   No current facility-administered medications for this visit.    No Known Allergies    Family History   Problem Relation Age of Onset    Lung cancer Brother         Lung cancer/Leukemia    Cancer Brother        Cancer-related family history includes Cancer in her brother; Lung cancer in her brother.    Social History     Tobacco Use    Smoking status: Every Day     Current packs/day: 0.50     Average packs/day: 0.5 packs/day for 48.0 years (24.0 ttl pk-yrs)     Types: Cigarettes     Start date: 1977    Smokeless tobacco: Never   Vaping Use    Vaping status: Never Used   Substance Use Topics     Alcohol use: Not Currently    Drug use: Never     Social History     Social History Narrative    Not on file      Review of Systems:  Constitutional: +fatigue (slightly improved), Denies any weakness, lack of appetite, excessive appetite, weight change, chills or fever.  Eyes: Reports no blurry vision, no double vision, no pain in the eyes, dry eyes or tearing.  ENT: Reports no decreased hearing capacity, ear pain or tinnitus. Denies any epistaxis, nasal congestion, mouth sores or bleeding, tooth or jaw pain, sore throat or hoarseness.  Respiratory: + chronic cough (better with oxy)  Denies any sputum production or hemoptysis. There is no wheezing, shortness of breath or any other respiratory complaints.  Cardiovascular: + shortness of breath with activity (slightly improved), Denies any chest pain, shortness of breath when lying down, palpitations, or leg swelling.  Breasts: Denies any lumps, bumps, pain, nipple changes or discharge in the breasts.  Gastrointestinal: +painful swallowing, nausea, vomiting, constipation (better per HPI) There are no complaints of abdominal pain, difficulty swallowing or anorexia, diarrhea,  heartburn, blood in the stools, dark stools, or change in bowel habits or hemorrhoids.  Genitourinary: Denies any pain or burning on urination, blood in the urine or frequent urination.   Musculoskeletal: Denies painful joints, no swelling of the joints, muscle or back pain. Denies any pain or tingling in the legs, hands or feet.  Neuropsychiatric: Denies any nervousness, depressed mood, headaches, blackouts, dizziness, weakness of limbs, loss of sensation, loss of balance, loss of coordination or difficulty in speaking.  Cutaneous: Denies any dry skin, itching, rash, change in the color of the skin, or any other cutaneous complaints.  Hematologic/Lymphatic: Denies any bruising or bleeding. Report no recent development of palpable or painful lymph nodes.  Allergy/Immunology: Denies rash/hayfever  "symptoms or any recent history of pneumonia, recurrent sinusitis, urinary infection or any other history of an ongoing infection.  Endocrine: Reports no intolerance to heat or cold, excessive thirst or excessive urination.    Objective:  Vital signs:  Vitals:    24 1253   BP: 107/77   Pulse: 112   Temp: 98.2 °F (36.8 °C)   TempSrc: Oral   SpO2: 100%   Weight: 70.9 kg (156 lb 3.2 oz)   Height: 172.7 cm (67.99\")   PainSc: 0-No pain     Body mass index is 23.76 kg/m².      Physical Exam:   ECO  GENERAL: The patient is a pleasant middle aged white female who appears their stated age and is awake, appears tired but in no acute distress, alert and oriented x3.  SKIN: No rash or ulcers.  HEME/LYMPHATICS: No bruising or petechiae on visual inspection. No lymphadenopathy on visual inspection and palpation of cervical, supraclavicular, infraclavicular lymph nodes.  HEAD/FACE: atraumatic and normocephalic. Normal hair.  EYES: PERRLA/EOMI. No scleral icterus. No tearing or dry eyes.  ENT: External ears normal with no evidence of discharge. No evidence of ulceration or bleeding in the nostrils. Mouth has no ulcers, bleeding or inflammation of the gums, floor or roof of the mouth with normal dentition.  NECK: Supple, symmetric.   CHEST/RESPIRATORY: Expansion maintained bilaterally and symmetrically. On auscultation, clear breath sounds in left lung, decreased breath sounds in upper right lung field but even better than prior. No wheezes, rhonchi or rales.  BREAST: Deferred.  CARDIOVASCULAR: On auscultation, regular rate and rhythm. No murmurs, gallops or rubs are heard.  GASTROINTESTINAL/ABDOMEN: Symmetric. On auscultation of the abdomen, there are normal bowel sounds in all four quadrants. There are no surgical scars in the abdomen. Nontender to palpation.  GENITOURINARY: Deferred.  NEUROLOGIC: Alert and oriented time, person, and place, with no apparent changes in recent or remote memory. Cranial nerves II-XII are " "grossly intact. Sensation is maintained in the extremities. Strength and tone appear normal for age and equal in the extremities. Gait is normal.  MUSCULOSKELETAL: No cyanosis, clubbing or edema in the extremities. There are no surgical scars on the extremities.  PSYCHIATRIC: The patient maintains judgment and has good insight. The patient has no changes in mood or affection.  IV: right chest Mediport accessed, NTTP, no erythema, edema or pus.    Lab Results - Last 18 Months   Lab Units 12/02/24  0815 11/04/24  0747 10/14/24  0748   WBC 10*3/mm3 5.38 8.85 12.12*   HEMOGLOBIN g/dL 10.9* 11.5* 12.3   HEMATOCRIT % 33.0* 35.1 38.3   PLATELETS 10*3/mm3 334 400 477*   MCV fL 88.0 85.2 85.5     Lab Results - Last 18 Months   Lab Units 12/02/24  0815 11/04/24  0747 10/14/24  0752 09/12/24  1151   SODIUM mmol/L 132* 133*  --  134*   POTASSIUM mmol/L 4.6 4.2  --  4.3   CHLORIDE mmol/L 97* 96*  --  98   CO2 mmol/L 21.8* 25.9  --  25.5   BUN mg/dL 12 12  --  8   CREATININE mg/dL 0.78 0.66 0.80 0.73   CALCIUM mg/dL 9.8 10.1  --  9.6   BILIRUBIN mg/dL 0.2 0.2  --  0.2   ALK PHOS U/L 106 109  --  104   ALT (SGPT) U/L 15 12  --  12   AST (SGOT) U/L 15 11  --  12   GLUCOSE mg/dL 406* 322*  --  251*       Lab Results   Component Value Date    GLUCOSE 406 (C) 12/02/2024    BUN 12 12/02/2024    CREATININE 0.78 12/02/2024    BCR 15.4 12/02/2024    K 4.6 12/02/2024    CO2 21.8 (L) 12/02/2024    CALCIUM 9.8 12/02/2024    ALBUMIN 3.7 12/02/2024    AST 15 12/02/2024    ALT 15 12/02/2024       Lab Results - Last 18 Months   Lab Units 10/10/24  0810 08/26/24  0945   INR  <0.93* 0.93   APTT seconds 30.8 27.1       No results found for: \"IRON\", \"TIBC\", \"FERRITIN\"    No results found for: \"FOLATE\"    No results found for: \"OCCULTBLD\"    No results found for: \"RETICCTPCT\"  No results found for: \"LRIIHLHZ60\"  No results found for: \"SPEP\", \"UPEP\"  No results found for: \"LDH\", \"URICACID\"  No results found for: \"BEBETO\", \"RF\", \"SEDRATE\"  No results " "found for: \"FIBRINOGEN\", \"HAPTOGLOBIN\"  Lab Results   Component Value Date    PTT 30.8 10/10/2024    INR <0.93 (L) 10/10/2024     No results found for: \"\"  No results found for: \"CEA\"  No components found for: \"CA-19-9\"  No results found for: \"PSA\"  No results found for: \"SEDRATE\"    Assessment & Plan   64 y.o. female with PMH of smoking, skin cancer, arthritis, GERD who presented 8/2024  for newly diagnosed lung cancer, currently on definitive chemoradiation.    Right hilar mass/right upper lobe adenocarcinoma, at minimum T4 N2 (IIIB)     Previously discussed the above results which include imaging and pathology with the patient.  -Staging PET/CT no metastatic disease but confirmed IIIB disease, nonsurgical candidate-previously discussed this in detail with the patient and her family and explained that the standard of care at this point would be chemoradiation with curative intent.  Discussed the couple chemotherapy regimens that can be used for radiation.  -Staging brain MRI negative for metastasis    -Radiology Oncology on board: pt completed her concurrent chemoradiation -11/22/2024  -PFTs from pulmonologist done per patient- per patient 50-60%? Sees him this month**.    -Biomarker testing +PD-L1 testing (category 1), +KRAS G12C, negative EGFR mutation including exon 19 del, L858R and other deletions, exon 20 insertions, (category 1), ALK fusions (category 1), ROS1 fusions, BRAF V600E, NTRK 1/2/3 fusions, MET exon 14 skipping or amplifications, RET fusions, ErbB2 (HER2) alterations,    PLAN: cisplatin plus pemetrexed Q21 days X 3 cycles with concurrent radiation with repeat imaging approx 30 days after, followed by durvalumab 1500 mg Q4 weeks x 12 cycles (category 1 for stage III)     - s/p B12 shot one week prior to chemo (also has on for C4, ordered),  - folic acid 1 mg daily and informed pt to start taking immediately     -got chemoteach (can get first B12 injection at that time)  -10/10 s/p Mediport " placement with EMLA cream available   -Will do CBC, CMP, mag while on treatment    Physical exam and labs within parameters, proceed with cycle 4 chemotherapy on 12/31/2024 pending her labs that day (per patient she does not want to be acutely ill and retching during [place] with her family) and return to clinic in approx 5 weeks with labs for post C4 chemotherapy tox check and to order immunotherapy  -12/2024 [facility] CT scan already showing improvement  - CT scan scheduled for 2/24/2024 by [facility] with f/u 3 days later    -*Data has shown a Palliative consult to help manage symptoms early on in care for advanced stage lung cancer patients.      2. chemotherapy associated nausea/vomiting  -preventative palonosetron 0.25 mg IV, fosaprepitant 150 mg IV, day 2 PO dexamethasone 4 mg twice daily T- 1, T0, T+ 1 take as directed (reordered for C4),  -scheduled Zyprexa (reordered for C4), and as needed Zofran (using Q8H d 2-11)- added compazine; if compazine working better than zyprexa, stop zyprexa and just use compazine and zofran      3. Tobacco dependence  -tried nicotine gum but stuck to dentures, is 1/2 ppd (but only 1/2 a cigarette when she smokes)    4.  Hyperglycemia, noted after start of treatment to 300s  -Patient started on 1000 mg metformin daily by PCP, BS still over 300 today, patient had eaten.  -Continue to monitor; patient has had metformin changed around a bit, says she has follow-up with PCP coming        Thank you very much for providing the opportunity to participate in this patient’s care. Please do not hesitate to call if there are any other questions.

## 2024-12-18 NOTE — PROGRESS NOTES
[facility] RADIATION ONCOLOGY  FOLLOW-UP NOTE    NAME: Laura Higginbotham  YOB: 1960  MRN #: [id number]  DATE OF SERVICE: 12/19/2024  REFERRING PROVIDER: Laura Archer   PRIMARY CARE PROVIDER: Laura Archer    CHIEF COMPLAINT:  2Wk F/U    DIAGNOSIS:   Encounter Diagnosis   Name Primary?    Primary adenocarcinoma of upper lobe of right lung Yes      RADIATION TREATMENT COURSE:    10/14/2024- 11/22/2024: 6000 cGy in 30 fractions to RUL.    INTERVAL HISTORY     Laura Higginbotham is a 64 y.o. female who was last seen in our office on 11/22/2024 upon completion of radiation therapy treatment.    She follows with Dr. Huynh, and was last seen on 12/02/2024. Their plan is to proceed with cycle 3 of chemotherapy (Pemetrexed/ Cisplatin), have CT scan per radiation oncology in February 2024, but have a CT chest with contrast ASAP for the T-E fistula, and follow up in 4 weeks or earlier as needed.    The patient reports significant improvement in her sore throat. She still has some discomfort when swallowing.    IMAGING     IR Port Placement  Result Date: 10/10/2024  Successful placement of right IJ 6 French Bard port utilizing ultrasound and fluoroscopic guidance. Report dictated by: Alvino Clark DNP  I have personally reviewed this case and agree with the findings above: Electronically Signed: Yemi Velasquez MD  10/10/2024 3:00 PM EDT  Workstation ID: [id number]    NM PET/CT Skull Base to Mid Thigh  Result Date: 9/25/2024  1. Hypermetabolic right lung mass involving the right upper lobe, right hilum, extending into the superior right lower lobe, is consistent with malignancy. 2. Enlarged precarinal lymph node with hypermetabolic activity consistent with suly metastasis. 3. No evidence of primary malignancy or metastatic disease in the neck, abdomen, pelvis, or skeleton. Electronically Signed: Megan Mcgill MD  9/25/2024 12:42 PM EDT  Workstation ID: [id number]    MRI Brain With & Without Contrast  Result Date:  9/20/2024  Impression: 1.No acute intracranial process identified. 2.Findings suggestive of minimal chronic small vessel ischemic disease. 3.No evidence of intracranial metastasis. Electronically Signed: Ritchie Escamilla MD  9/20/2024 12:21 PM EDT  Workstation ID: [id number]    PATHOLOGY     No recent pathology to review.    LABS     HEMATOLOGY:  WBC   Date Value Ref Range Status   12/02/2024 5.38 3.40 - 10.80 10*3/mm3 Final     RBC   Date Value Ref Range Status   12/02/2024 3.75 (L) 3.77 - 5.28 10*6/mm3 Final     Hemoglobin   Date Value Ref Range Status   12/02/2024 10.9 (L) 12.0 - 15.9 g/dL Final     Hematocrit   Date Value Ref Range Status   12/02/2024 33.0 (L) 34.0 - 46.6 % Final     Platelets   Date Value Ref Range Status   12/02/2024 334 140 - 450 10*3/mm3 Final     CHEMISTRY:  Lab Results   Component Value Date    GLUCOSE 406 (C) 12/02/2024    BUN 12 12/02/2024    CREATININE 0.78 12/02/2024    BCR 15.4 12/02/2024    K 4.6 12/02/2024    CO2 21.8 (L) 12/02/2024    CALCIUM 9.8 12/02/2024    ALBUMIN 3.7 12/02/2024    AST 15 12/02/2024    ALT 15 12/02/2024     PROBLEM LIST     Patient Active Problem List   Diagnosis    Malignant neoplasm of upper lobe of right lung    Moderate major depression, single episode    Organic anxiety disorder    Polyneuropathy    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    Chemotherapy-induced nausea    Choking      CURRENT MEDICATIONS     Current Outpatient Medications   Medication Instructions    albuterol sulfate  (90 Base) MCG/ACT inhaler 1 puff, Every 4 Hours PRN    Breztri Aerosphere 160-9-4.8 MCG/ACT aerosol inhaler 2 puffs, 2 Times Daily    dexAMETHasone (DECADRON) 4 MG tablet Take 1 tablet twice a day for 3 consecutive days beginning day before chemo    famotidine (PEPCID) 20 mg, Oral, 2 Times Daily    folic acid (FOLATE) 400 mcg, Oral, Daily    gabapentin (NEURONTIN) 100 mg, 2 Times Daily - RT    Glucose Blood (Blood Glucose Test Strips 333) strip  Check sugar twice a day and PRN and keep a log In Vitro    lidocaine-prilocaine (EMLA) 2.5-2.5 % cream 1 Application, Topical, Every 2 Hours PRN, To port site before chemo/blood draw    metFORMIN (GLUCOPHAGE) 1000 MG tablet Every 12 Hours Scheduled    naloxone (NARCAN) 4 MG/0.1ML nasal spray Call 911. Don't prime. [place] in 1 nostril for overdose. Repeat in 2-3 minutes in other nostril if no or minimal breathing/responsiveness.    NP Thyroid 30 MG tablet TAKE 1 TABLET BY MOUTH EVERY DAY ON EMPTY STOMACH FOR 30 DAYS    Nystatin (magic mouthwash) 5 mL, Swish & Swallow, 4 Times Daily Before Meals & Nightly    OLANZapine (zyPREXA) 5 MG tablet Take one tablet every night for four nights starting the night of chemotherapy    ondansetron ODT (ZOFRAN-ODT) 8 mg, Oral, Every 8 Hours PRN    oxyCODONE (ROXICODONE) 7.5 mg, Oral, Every 4 Hours PRN    predniSONE (DELTASONE) 10 mg, 2 Times Daily    prochlorperazine (COMPAZINE) 10 mg, Oral, Every 6 Hours PRN      ALLERGIES     No Known Allergies    REVIEW OF SYSTEMS     Review of Systems   Constitutional:  Positive for activity change.   HENT:  Positive for sore throat and trouble swallowing.       Vitals:    12/19/24 1233   BP: 124/82   Pulse: 118   Resp: 18   Temp: 97.4 °F (36.3 °C)   SpO2: 99%      Physical Exam  Constitutional:       General: She is not in acute distress.  HENT:      Head: Normocephalic and atraumatic.   Pulmonary:      Effort: Pulmonary effort is normal. No respiratory distress.   Neurological:      Mental Status: She is alert and oriented to person, place, and time. Mental status is at baseline.   Psychiatric:         Mood and Affect: Mood normal.         Behavior: Behavior normal.        ECOG:  Restricted in physically strenuous activity but ambulatory and able to carry out work of a light or sedentary nature, e.g., light house work, office work = 1      ASSESSMENT AND PLAN     ASSESSMENT:      Laura Higginbotham is a 64 y.o. female with history of Stage IIIB (cT4,  cN2, cM0) right upper lobe lung adenocarcinoma. She completed definitive chemoradiation on 11/22/2024. She returns for routine follow-up.       Diagnoses and all orders for this visit:    1. Primary adenocarcinoma of upper lobe of right lung (Primary)       PLAN:      Orders:  - Plan for imaging 3 months post-treatment  - Follow-up after imaging or sooner as clinically indicated  - Recently refilled pain medicine, encouraged patient to reach out if pain persists and she needs another refill    The patient is recovering appropriately post-radiation. She still has some irritation swallowing. We discussed pain management strategies. The patient's pain medicine was recently refilled. The patient had recent imaging which showed a decrease in size of her primary lung mass. We discussed plans for repeat imaging 3 months post treatment to continue to monitor her treatment response. The patient is encouraged to reach out with questions or concerns prior to her next appointment.     I spent 20 minutes caring for Laura on this date of service. This time includes time spent by me in the following activities:preparing for the visit, reviewing tests, obtaining and/or reviewing a separately obtained history, performing a medically appropriate examination and/or evaluation , counseling and educating the patient/family/caregiver, documenting information in the medical record, and independently interpreting results and communicating that information with the patient/family/caregiver    FOLLOW UP     No follow-ups on file.     CC: Laura Archer Karen

## 2024-12-19 ENCOUNTER — OFFICE VISIT (OUTPATIENT)
Dept: ONCOLOGY | Facility: CLINIC | Age: 64
End: 2024-12-19
Payer: MEDICAID

## 2024-12-19 ENCOUNTER — HOSPITAL ENCOUNTER (OUTPATIENT)
Dept: ONCOLOGY | Facility: HOSPITAL | Age: 64
Discharge: HOME | End: 2024-12-19
Admitting: INTERNAL MEDICINE
Payer: MEDICAID

## 2024-12-19 ENCOUNTER — OFFICE VISIT (OUTPATIENT)
Dept: RADIATION ONCOLOGY | Facility: HOSPITAL | Age: 64
End: 2024-12-19
Payer: MEDICAID

## 2024-12-19 VITALS
WEIGHT: 156.2 LBS | BODY MASS INDEX: 23.67 KG/M2 | OXYGEN SATURATION: 100 % | SYSTOLIC BLOOD PRESSURE: 107 MMHG | HEIGHT: 68 IN | DIASTOLIC BLOOD PRESSURE: 77 MMHG | HEART RATE: 112 BPM | TEMPERATURE: 98.2 F

## 2024-12-19 VITALS
HEIGHT: 68 IN | SYSTOLIC BLOOD PRESSURE: 124 MMHG | BODY MASS INDEX: 23.64 KG/M2 | DIASTOLIC BLOOD PRESSURE: 82 MMHG | HEART RATE: 118 BPM | WEIGHT: 156 LBS | RESPIRATION RATE: 18 BRPM | TEMPERATURE: 97.4 F | OXYGEN SATURATION: 99 %

## 2024-12-19 DIAGNOSIS — R11.2 CHEMOTHERAPY INDUCED NAUSEA AND VOMITING: ICD-10-CM

## 2024-12-19 DIAGNOSIS — C34.11 PRIMARY ADENOCARCINOMA OF UPPER LOBE OF RIGHT LUNG: Primary | ICD-10-CM

## 2024-12-19 DIAGNOSIS — T45.1X5A CHEMOTHERAPY-INDUCED NAUSEA: ICD-10-CM

## 2024-12-19 DIAGNOSIS — C34.90 NON-SMALL CELL LUNG CANCER METASTATIC TO INTRATHORACIC LYMPH NODE: ICD-10-CM

## 2024-12-19 DIAGNOSIS — C34.11 MALIGNANT NEOPLASM OF UPPER LOBE OF RIGHT LUNG: Primary | ICD-10-CM

## 2024-12-19 DIAGNOSIS — T45.1X5A CHEMOTHERAPY INDUCED NAUSEA AND VOMITING: ICD-10-CM

## 2024-12-19 DIAGNOSIS — R11.0 CHEMOTHERAPY-INDUCED NAUSEA: ICD-10-CM

## 2024-12-19 DIAGNOSIS — C77.1 NON-SMALL CELL LUNG CANCER METASTATIC TO INTRATHORACIC LYMPH NODE: ICD-10-CM

## 2024-12-19 PROCEDURE — 96523 IRRIG DRUG DELIVERY DEVICE: CPT

## 2024-12-19 PROCEDURE — G0463 HOSPITAL OUTPT CLINIC VISIT: HCPCS | Performed by: INTERNAL MEDICINE

## 2024-12-19 PROCEDURE — 25010000002 HEPARIN LOCK FLUSH PER 10 UNITS: Performed by: INTERNAL MEDICINE

## 2024-12-19 RX ORDER — SODIUM CHLORIDE 0.9 % (FLUSH) 0.9 %
10 SYRINGE (ML) INJECTION AS NEEDED
OUTPATIENT
Start: 2024-12-19

## 2024-12-19 RX ORDER — BLOOD SUGAR DIAGNOSTIC
STRIP MISCELLANEOUS
COMMUNITY
Start: 2024-12-04

## 2024-12-19 RX ORDER — SODIUM CHLORIDE 9 MG/ML
20 INJECTION, SOLUTION INTRAVENOUS ONCE
OUTPATIENT
Start: 2024-12-31

## 2024-12-19 RX ORDER — OLANZAPINE 5 MG/1
TABLET ORAL
Qty: 4 TABLET | Refills: 0 | Status: SHIPPED | OUTPATIENT
Start: 2024-12-19

## 2024-12-19 RX ORDER — HEPARIN SODIUM (PORCINE) LOCK FLUSH IV SOLN 100 UNIT/ML 100 UNIT/ML
500 SOLUTION INTRAVENOUS AS NEEDED
OUTPATIENT
Start: 2024-12-19

## 2024-12-19 RX ORDER — PALONOSETRON 0.05 MG/ML
0.25 INJECTION, SOLUTION INTRAVENOUS ONCE
OUTPATIENT
Start: 2024-12-31

## 2024-12-19 RX ORDER — CYANOCOBALAMIN 1000 UG/ML
1000 INJECTION, SOLUTION INTRAMUSCULAR; SUBCUTANEOUS ONCE
OUTPATIENT
Start: 2024-12-31 | End: 2024-12-31

## 2024-12-19 RX ORDER — HEPARIN SODIUM (PORCINE) LOCK FLUSH IV SOLN 100 UNIT/ML 100 UNIT/ML
500 SOLUTION INTRAVENOUS AS NEEDED
Status: DISCONTINUED | OUTPATIENT
Start: 2024-12-19 | End: 2024-12-20 | Stop reason: HOSPADM

## 2024-12-19 RX ORDER — SODIUM CHLORIDE 0.9 % (FLUSH) 0.9 %
10 SYRINGE (ML) INJECTION AS NEEDED
Status: DISCONTINUED | OUTPATIENT
Start: 2024-12-19 | End: 2024-12-20 | Stop reason: HOSPADM

## 2024-12-19 RX ORDER — DEXAMETHASONE 4 MG/1
TABLET ORAL
Qty: 8 TABLET | Refills: 0 | Status: SHIPPED | OUTPATIENT
Start: 2024-12-19

## 2024-12-19 RX ADMIN — Medication 10 ML: at 13:12

## 2024-12-19 RX ADMIN — HEPARIN 500 UNITS: 100 SYRINGE at 13:12

## 2024-12-19 NOTE — PROGRESS NOTES
Port accessed and flushed with good blood return noted. No labs collected. Port flushed with saline and heparin prior to needle removal.

## 2024-12-19 NOTE — PATIENT INSTRUCTIONS
Labs and chemo on 12/31, no provider visit    -zyprexa and dexamethasone sent to pharmacy for last chemo      RTC in mid Jan for after chemo tox check with labs and port flush that day to ensure still doing well

## 2024-12-30 ENCOUNTER — TELEPHONE (OUTPATIENT)
Dept: ONCOLOGY | Facility: CLINIC | Age: 64
End: 2024-12-30
Payer: MEDICAID

## 2024-12-30 NOTE — TELEPHONE ENCOUNTER
Received a call from the pt stating that she tested positive for COVID yesterday and is experiencing cough, headache, and sore throat. She denied any fevers. She stated that she wanted to reschedule her infusion appt for tomorrow. I advised her that I would cancel the appt and send a message to scheduling to get her rescheduled. She verbalized understanding. No further questions or needs at this time.

## 2025-01-14 ENCOUNTER — TELEPHONE (OUTPATIENT)
Dept: ONCOLOGY | Facility: CLINIC | Age: 65
End: 2025-01-14

## 2025-01-14 NOTE — TELEPHONE ENCOUNTER
Caller: Laura Higginbotham    Relationship to patient: Self    Best call back number:     [phone]     Chief complaint: PT CALLED ON 12/30 TO HAVE HER INFUSION R/S DUE TO HAVING COVID. PT HAS NOT HEARD ANYTHING BACK ABOUT BEING R/S FOR THE INFUSION. HER FOLLOW UP WITH DR SOUZA WAS SUPPOSED TO BE 2 WEEKS AFTER THE INFUSION. PT NEEDS APPT R/S     Type of visit: INFUSION / LAB AND FOLLOW UP     Requested date: INFUSION - ASAP / LAB AND FOLLOW UP 2 WEEKS AFTER INFUSION R/S     If rescheduling, when is the original appointment: 12/31 AND 01/16

## 2025-01-15 NOTE — PROGRESS NOTES
HEMATOLOGY ONCOLOGY OUTPATIENT FOLLOW UP       Patient name: Laura Higginbotham  : 1960  MRN: [id number]  Primary Care Physician: Laura Archer  Referring Physician: Laura Archer  Reason For Consult: right hilar adenocarcinoma of the lung    TELEHEALTH VISIT: unable to use visual resource, done as audio only  Practitioner: in clinic  Patient: in house, was unable to leave given still very sick after COVID  Visit time: 16 minutes      History of Present Illness:  Patient is a 64 y.o. PMH of smoking, skin cancer, arthritis, GERD who presents for newly diagnosed lung cancer.    -2024 Pt went to Dr Archer for increased nonproductive coughing and feeling more dysnpeic on exertion. Was dx with pleurisy and given prednisone which helped somewhat.  -2024 Since cough hadn't completely gone away in 6 weeks, patient went back to Dr. Archer who became concerned and ordered a CXR and antibiotics.  -CXR showed a right hilar mass, CT was ordered.    -2024 CT chest at [facility]: Revealed a very large right hilar mass measuring 9.2 x 9.5 cm in diameter with hypodensity centrally suggesting a necrotic center.  There is obstruction of the right upper lobe bronchus with the infiltrate seen extending peripheral to that area and marked elevation of the right hilum.  Mass extends into the right hilar lymph nodes.    Patient was seen in consultation by Dr. Crystal Powell, pulmonology. She denied having a cough, hemoptysis, wheezing, dyspnea, fever, unintentional weight loss.    -2020 for EBUS bronchoscopy by Dr. Crystal Powell with FNA to the right hilar node 10R and to the right hilar mass revealing inés revealing malignant cells favoring non-small cell.    Pathology showed in the right hilar mass IHC positive for CK7, TTF-1, and Napsin, negative for p63 and CK/6 consistent with pulmonary adenocarcinoma negative for CD56, chromogranin, synaptophysin excluding neuroendocrine component.  Per discussion with pathology:  Passes from the right hilar mass were found to be acellular, viable tissue was from the lymph node biopsied and 1 cassette is available for future testing.  -Pneumocystis was negative,  respiratory culture showing rare growth of normal respiratory alexi with no S. aureus or Pseudomonas aeruginosa detected.,  Respiratory panel PCR negative, PRELIM: no acid-fast bacilli seen on concentrated smear at 1 week, No isolated fungus at 1 week,.  -8/30/2024 CBC showed WBC 11.53, hemoglobin 13.1, hematocrit 40.7 with essentially normal indices, platelets 332K    -9/12/2024 Patient presented for initial consultation with her niece reporting constant nonproductive cough, she still denies having hemoptysis, wheezing, dyspnea, fever, or unintentional weight loss, new bony pains, no new H/A or focal neuro deficit except right under arm new tingling/burning.  She has a history of smoking 1 PPD- started at 17, this summer cut down to 1/2 PPD mostly because of the coughing (47+ years).    -9/20/2024 MRI brain with without contrast with no acute intracranial process identified, findings suggestive of minimal chronic small vessel ischemic disease but no evidence of intracranial metastasis    -9/23/2025 [facility] shows PD-L1 positive with TPS 80% by PDL1 test 22C3, with level 1 evidence for cemiplimab and pembrolizumab, positive by PD-L1 28-8 with level 1 evidence for nivolumab/ipilimumab combination, positive by PD-L1  with TC 1+, 50% with atezolizumab in the metastatic setting level 1 evidence, and PD-L1 positive and  positive TC 1+60% with a benefit of atezolizumab in the adjuvant setting, and cemiplimab level 1 evidence.  -ALK negative/fusion not detected by RNA in the tumor, BRAF mutation not detected, EGFR mutation not detected, MET variant transcript not detected, RET fusion not detected, ROS1 fusion not detected.  Tumor mutation burden was high at 18, microsatellite stable.  KRAS pG12V found and 61%, TP53 qM781J found in  56%, NFKBIA was amplified    -9/25/2024 NM PET/CT skull initial staging: Heterogeneous right upper lobe lung mass extending into the superior right lower lobe, encasing the right mainstem bronchus, encasing and narrowing the right upper lobe bronchus, is hypermetabolic (mSUV 8.76), consistent with malignancy. It has heterogeneous areas of photopenia, suggesting internal necrosis. Pre-carinal lymph node measuring 14 mm short axis is FDG avid (mSUV 4.0), consistent with malignancy.  No hybrid avidity in the neck, abdomen, pelvis, or skeleton. No left hilar adenopathy. No suspicious left lung nodules. Normal heart size with coronary calcifications. Benign calcified mediastinal and left hilar lymph nodes are present.    -10/2/2024 Patient presents in follow up with her niece reporting better nonproductive cough and rib pain since being oxycodone low dose, she still denies having hemoptysis, wheezing, dyspnea, fever, or unintentional weight loss, new bony pains, no new H/A or focal neuro deficit except right under arm new tingling/burning. Got SIMMED yesterday for radiation.  She has a history of smoking 1 PPD- started at 17, still on 1/2 PPD - gum stuck to dentures.    -10/3/2024 B12 1000 mcg given IM  -10/10 s/p Mediport placement    -10/14/2024 started chemoradiation (cisplatin +pemetrexed) reporting still with nonproductive cough and rib pain since being oxycodone low dose. Picked up her nausea medications, got her port,  her Emla cream (ports still a little tender), started her folate soon as she got it.  Did get her chemo teach and got her B12 shot as well she is ready to go.    -11/4/2024 Follow up for C2 chemoradiation reporting nonproductive cough and rib pain both better since being oxycodone low dose, using stool softener and yogurt which has greatly helped with stooling appropriately (EOD), patient had a lot of nausea the first 9 days, needing the zofran Q8H that time, a lot of fatigue.  -11/22/2024.   Patient completed radiation therapy to a total dose of 60 Gray (2 Gray per fraction with total of 30 fractions (under Dr. Ivan Dasilva).    -12/2/2024 Follow up for C3 chemotherapy, completed radiation last week.  She is reporting more productive cough (mostly clear and thick occ some green); reports when she is drinking warm liquids or cold liquids he is still having pain when she tries to swallow the temperature seems to go down her throat where she chokes a little bit and then seems to radiate over to the upper right side of her chest this is relatively new.  Denies having any fevers, chills, pleuritic chest pain, shortness of breath in fact breathing seems to be easier.  She feels wiped out and rib pain better since being oxycodone low dose, using stool softener and yogurt which has greatly helped with stooling appropriately (EOD), patient still had a lot of nausea the first 9 days, even with addition of Zyprexa, needing the zofran Q8H that time, and admits that she is still taking the dexamethasone as prescribed day before, day of, day after and those are proximal to starting day of.    -Reviewed the report of the CT chest done at [facility] a few days ago shows some improvement of the size of her tumor and no concern of PNA or TE fistula.     -12/19/2024 Patient presents in follow up to ensure she will be ready for C4 chemotherapy on 12/31.  She is reporting the farther she gets from radiation, the more improvement she has in the productive cough (mostly clear and thick occ some green); reports also some improvement in the sensation of when she is drinking warm liquids or cold liquids not having as frequent or as severe pain when she tries to swallow the temperature seems to go down her throat, nor is she choking as much or radiating- over to the upper right side of her chest.  Denies having any fevers, chills, pleuritic chest pain, shortness of breath in fact breathing seems to be easier.  Her energy is a  littler better, and rib pain better since completion of radiation;   -medicare finally filling the next oxycodone low dose Rx ordered by Dr Dasilva (also has Narcan)  -using stool softener and yogurt which has greatly helped with stooling appropriately (EOD)  -patient still had a lot of nausea the first 9 days, even with addition of Zyprexa (needs refill for C4), needing the zofran Q8H that time,   -still taking the dexamethasone as prescribed day before, day of, day after and those are proximal to starting day of (needs refill for C4).      Subjective:  -2025 Got COVID H/A sore throat, body aches-all better except for back- in the middle, down towards the bottom (never ran fever) Her original productive cough-(mostly clear and thick occ some green); got worse with COVID- still worse, still not better. No shortness of breath. Because of this, has missed her 4th and last dose of chemo. Still not feeling well enough for in person visit today.    Past Medical History:   Diagnosis Date    Arthritis     Cancer     skin cancer on leg right    GERD (gastroesophageal reflux disease)        Past Surgical History:   Procedure Laterality Date    BRONCHOSCOPY N/A 2024    Procedure: BRONCHOSCOPY WITH BRONCHIAL WASHING ,ENDOBRONCHIAL ULTRASOUND WITH FINE NEEDLE ASPIRATION X2 AREAS;  Surgeon: Crystal Powell MD;  Location: [facility] ENDOSCOPY;  Service: Pulmonary;  Laterality: N/A;     SECTION      x3    KNEE SURGERY Left     ORIF, screws and plates, has had Operation on it x 3         Current Outpatient Medications:     ondansetron ODT (ZOFRAN-ODT) 8 MG disintegrating tablet, Take 1 tablet by mouth Every 8 (Eight) Hours As Needed for Nausea or Vomiting., Disp: 45 tablet, Rfl: 3    albuterol sulfate  (90 Base) MCG/ACT inhaler, Inhale 1 puff Every 4 (Four) Hours As Needed for Wheezing. (Patient not taking: Reported on 2024), Disp: , Rfl:     Breztri Aerosphere 160-9-4.8 MCG/ACT aerosol inhaler, Inhale 2  puffs 2 (Two) Times a Day., Disp: , Rfl:     dexAMETHasone (DECADRON) 4 MG tablet, Take 1 tablet twice a day for 3 consecutive days beginning day before chemo, Disp: 8 tablet, Rfl: 0    famotidine (PEPCID) 20 MG tablet, Take 1 tablet by mouth 2 (Two) Times a Day., Disp: 30 tablet, Rfl: 2    folic acid (Folate) 400 MCG tablet, Take 1 tablet by mouth Daily for 270 days., Disp: 90 tablet, Rfl: 2    gabapentin (NEURONTIN) 100 MG capsule, Take 1 capsule by mouth 2 (Two) Times a Day., Disp: , Rfl:     Glucose Blood (Blood Glucose Test Strips 333) strip, Check sugar twice a day and PRN and keep a log In Vitro, Disp: , Rfl:     lidocaine-prilocaine (EMLA) 2.5-2.5 % cream, Apply 1 Application topically to the appropriate area as directed Every 2 (Two) Hours As Needed for Mild Pain. To port site before chemo/blood draw, Disp: 30 g, Rfl: 2    metFORMIN (GLUCOPHAGE) 1000 MG tablet, Every 12 (Twelve) Hours., Disp: , Rfl:     naloxone (NARCAN) 4 MG/0.1ML nasal spray, Call 911. Don't prime. Labolt in 1 nostril for overdose. Repeat in 2-3 minutes in other nostril if no or minimal breathing/responsiveness., Disp: 2 each, Rfl: 0    NP Thyroid 30 MG tablet, TAKE 1 TABLET BY MOUTH EVERY DAY ON EMPTY STOMACH FOR 30 DAYS, Disp: , Rfl:     Nystatin (magic mouthwash), Swish and swallow 5 mL 4 (Four) Times a Day Before Meals & at Bedtime., Disp: 1 bottle, Rfl: 2    OLANZapine (zyPREXA) 5 MG tablet, Take one tablet every night for four nights starting the night of chemotherapy, Disp: 4 tablet, Rfl: 0    oxyCODONE (ROXICODONE) 5 MG/5ML solution, Take 7.5 mL by mouth Every 4 (Four) Hours As Needed for Moderate Pain., Disp: 300 mL, Rfl: 0    predniSONE (DELTASONE) 10 MG tablet, Take 1 tablet by mouth 2 (Two) Times a Day. X 30 days (Patient not taking: Reported on 12/19/2024), Disp: , Rfl:     prochlorperazine (COMPAZINE) 10 MG tablet, Take 1 tablet by mouth Every 6 (Six) Hours As Needed for Nausea or Vomiting., Disp: 60 tablet, Rfl: 1    No  Known Allergies    Family History   Problem Relation Age of Onset    Lung cancer Brother         Lung cancer/Leukemia    Cancer Brother        Cancer-related family history includes Cancer in her brother; Lung cancer in her brother.    Social History     Tobacco Use    Smoking status: Every Day     Current packs/day: 0.50     Average packs/day: 0.5 packs/day for 48.0 years (24.0 ttl pk-yrs)     Types: Cigarettes     Start date: 1977    Smokeless tobacco: Never   Vaping Use    Vaping status: Never Used   Substance Use Topics    Alcohol use: Not Currently    Drug use: Never     Social History     Social History Narrative    Not on file      Review of Systems:  Constitutional: +fatigue (worse 2/2 COVID), lack of appetite, Denies any weakness, excessive appetite, weight change, chills or fever.  Eyes: Reports no blurry vision, no double vision, no pain in the eyes, dry eyes or tearing.  ENT: Reports no decreased hearing capacity, ear pain or tinnitus. Denies any epistaxis, nasal congestion, mouth sores or bleeding, tooth or jaw pain, sore throat or hoarseness.  Respiratory: + chronic cough (worse with COVID)  Denies any sputum production or hemoptysis. There is no wheezing, shortness of breath or any other respiratory complaints.  Cardiovascular: + shortness of breath with activity (stable), Denies any chest pain, shortness of breath when lying down, palpitations, or leg swelling.  Breasts: Denies any lumps, bumps, pain, nipple changes or discharge in the breasts.  Gastrointestinal: +painful swallowing, nausea, vomiting, constipation (stable) There are no complaints of abdominal pain, difficulty swallowing or anorexia, diarrhea,  heartburn, blood in the stools, dark stools, or change in bowel habits or hemorrhoids.  Genitourinary: Denies any pain or burning on urination, blood in the urine or frequent urination.   Musculoskeletal: +body aches from COVID (all improving except back pain) Denies painful joints, no swelling  of the joints, muscle or back pain. Denies any pain or tingling in the legs, hands or feet.  Neuropsychiatric: Denies any nervousness, depressed mood, headaches, blackouts, dizziness, weakness of limbs, loss of sensation, loss of balance, loss of coordination or difficulty in speaking.  Cutaneous: Denies any dry skin, itching, rash, change in the color of the skin, or any other cutaneous complaints.  Hematologic/Lymphatic: Denies any bruising or bleeding. Report no recent development of palpable or painful lymph nodes.  Allergy/Immunology: Denies rash/hayfever symptoms or any recent history of pneumonia, recurrent sinusitis, urinary infection or any other history of an ongoing infection.  Endocrine: Reports no intolerance to heat or cold, excessive thirst or excessive urination.    Objective:  Vital signs:  There were no vitals filed for this visit.  There is no height or weight on file to calculate BMI.      Physical Exam:   TELEVISIT    Lab Results - Last 18 Months   Lab Units 12/02/24  0815 11/04/24  0747 10/14/24  0748   WBC 10*3/mm3 5.38 8.85 12.12*   HEMOGLOBIN g/dL 10.9* 11.5* 12.3   HEMATOCRIT % 33.0* 35.1 38.3   PLATELETS 10*3/mm3 334 400 477*   MCV fL 88.0 85.2 85.5     Lab Results - Last 18 Months   Lab Units 12/02/24  0815 11/04/24  0747 10/14/24  0752 09/12/24  1151   SODIUM mmol/L 132* 133*  --  134*   POTASSIUM mmol/L 4.6 4.2  --  4.3   CHLORIDE mmol/L 97* 96*  --  98   CO2 mmol/L 21.8* 25.9  --  25.5   BUN mg/dL 12 12  --  8   CREATININE mg/dL 0.78 0.66 0.80 0.73   CALCIUM mg/dL 9.8 10.1  --  9.6   BILIRUBIN mg/dL 0.2 0.2  --  0.2   ALK PHOS U/L 106 109  --  104   ALT (SGPT) U/L 15 12  --  12   AST (SGOT) U/L 15 11  --  12   GLUCOSE mg/dL 406* 322*  --  251*       Lab Results   Component Value Date    GLUCOSE 406 (C) 12/02/2024    BUN 12 12/02/2024    CREATININE 0.78 12/02/2024    BCR 15.4 12/02/2024    K 4.6 12/02/2024    CO2 21.8 (L) 12/02/2024    CALCIUM 9.8 12/02/2024    ALBUMIN 3.7 12/02/2024     "AST 15 12/02/2024    ALT 15 12/02/2024       Lab Results - Last 18 Months   Lab Units 10/10/24  0810 08/26/24  0945   INR  <0.93* 0.93   APTT seconds 30.8 27.1       No results found for: \"IRON\", \"TIBC\", \"FERRITIN\"    No results found for: \"FOLATE\"    No results found for: \"OCCULTBLD\"    No results found for: \"RETICCTPCT\"  No results found for: \"OMEAYWRT04\"  No results found for: \"SPEP\", \"UPEP\"  No results found for: \"LDH\", \"URICACID\"  No results found for: \"BEBETO\", \"RF\", \"SEDRATE\"  No results found for: \"FIBRINOGEN\", \"HAPTOGLOBIN\"  Lab Results   Component Value Date    PTT 30.8 10/10/2024    INR <0.93 (L) 10/10/2024     No results found for: \"\"  No results found for: \"CEA\"  No components found for: \"CA-19-9\"  No results found for: \"PSA\"  No results found for: \"SEDRATE\"    Assessment & Plan     64 y.o. female with PMH of smoking, skin cancer, arthritis, GERD who presented 8/2024  for newly diagnosed lung cancer, currently on definitive chemoradiation.    Right hilar mass/right upper lobe adenocarcinoma, at minimum T4 N2 (IIIB)     Previously discussed the above results which include imaging and pathology with the patient.  -Staging PET/CT no metastatic disease but confirmed IIIB disease, nonsurgical candidate-previously discussed this in detail with the patient and her family and explained that the standard of care at this point would be chemoradiation with curative intent.  Discussed the couple chemotherapy regimens that can be used for radiation.  -Staging brain MRI negative for metastasis    -Radiology Oncology on board: pt completed her concurrent chemoradiation -11/22/2024  -PFTs from pulmonologist done per patient- per patient 50-60%? Sees him this month**.    -Biomarker testing +PD-L1 testing (category 1), +KRAS G12C, negative EGFR mutation including exon 19 del, L858R and other deletions, exon 20 insertions, (category 1), ALK fusions (category 1), ROS1 fusions, BRAF V600E, NTRK 1/2/3 fusions, MET exon 14 " skipping or amplifications, RET fusions, ErbB2 (HER2) alterations,    PLAN: cisplatin plus pemetrexed Q21 days X 3 cycles with concurrent radiation with repeat imaging showing improvement no progression (completed), followed by durvalumab 1500 mg Q4 weeks x 12 cycles (category 1 for stage III)     - s/p B12 shot one week prior to chemo  - folic acid 1 mg daily and informed pt to start taking immediately-can stop approx one month after end of chemotherapy**     -10/10/24s/p Mediport placement with EMLA cream available  Didn't get cycle 4 chemotherapy on 12/31/2024 given COVID    -1/16/25 Discussed durvalumab maintenance therapy every 2-4 weeks for one year per NCCN guidelines and FDA approval. We discussed that from the [facility] trial data, in stage III NSCLC nonresectable patients that receive durvalumab post definitive chemoradiation vs. those that did not, have a 48% reduction in risk of progression. 5 year post hoc analysis showed median overall survival with durvalumab was 47.5 months vs. 29.1 months with placebo. HR 0.72. We previously discussed that durvalumab is a PD-L1 inhibitor and classified as immunotherapy. Patient was provided a [facility] information sheet on Durvalumab. We discussed side effects including but not limited to immune mediated effects such as pneumonitis, colitis and hepatitis as well as nausea, diarrhea, fatigue, electrolyte abnormalities and infusion reactions.    --12/18/2024 [facility] CT chest scan already showing improvement in lung cancer size with no signs of progression near end of radiation completion showing improvement in lung cancer. Cannot do repeat imaging at this time given recent COVID infection (has been known to make lung nodules on imaging)    PLAN: durvalumab 1500 mg Q4 weeks x 12-13 cycles    -will get labs every cycle, already has port, will order to get PA, RTC for C1  - CT scan scheduled for 2/24/2024 by [facility] with f/u 3 days later    -*Data has shown a Palliative  consult to help manage symptoms early on in care for advanced stage lung cancer patients.      2. therapy associated nausea/vomiting (chemo completed)  -has needed Zofran (using Q8H d 2-11)- added compazine; suspect with immunotherapy will not be as difficult    3. Tobacco dependence  -tried nicotine gum but stuck to dentures, is 1/2 ppd (but only 1/2 a cigarette when she smokes)    4.  Hyperglycemia, noted after start of treatment to 300s  -Patient started on 1000 mg metformin daily by PCP, BS still over 300 today, patient had eaten.  -Continue to monitor; patient has had metformin changed around a bit, says she has follow-up with PCP coming        Thank you very much for providing the opportunity to participate in this patient’s care. Please do not hesitate to call if there are any other questions.

## 2025-01-16 ENCOUNTER — TELEMEDICINE (OUTPATIENT)
Dept: ONCOLOGY | Facility: CLINIC | Age: 65
End: 2025-01-16
Payer: MEDICARE

## 2025-01-16 DIAGNOSIS — T45.1X5A CHEMOTHERAPY-INDUCED NAUSEA: ICD-10-CM

## 2025-01-16 DIAGNOSIS — R11.2 CHEMOTHERAPY INDUCED NAUSEA AND VOMITING: ICD-10-CM

## 2025-01-16 DIAGNOSIS — C77.1 NON-SMALL CELL LUNG CANCER METASTATIC TO INTRATHORACIC LYMPH NODE: ICD-10-CM

## 2025-01-16 DIAGNOSIS — Z51.81 ENCOUNTER FOR THERAPEUTIC DRUG MONITORING: ICD-10-CM

## 2025-01-16 DIAGNOSIS — C34.11 MALIGNANT NEOPLASM OF UPPER LOBE OF RIGHT LUNG: Primary | ICD-10-CM

## 2025-01-16 DIAGNOSIS — R11.0 CHEMOTHERAPY-INDUCED NAUSEA: ICD-10-CM

## 2025-01-16 DIAGNOSIS — C34.90 NON-SMALL CELL LUNG CANCER METASTATIC TO INTRATHORACIC LYMPH NODE: ICD-10-CM

## 2025-01-16 DIAGNOSIS — T45.1X5A CHEMOTHERAPY INDUCED NAUSEA AND VOMITING: ICD-10-CM

## 2025-01-16 RX ORDER — ONDANSETRON 8 MG/1
8 TABLET, ORALLY DISINTEGRATING ORAL EVERY 8 HOURS PRN
Qty: 45 TABLET | Refills: 3 | Status: SHIPPED | OUTPATIENT
Start: 2025-01-16

## 2025-01-30 PROBLEM — Z29.89 ENCOUNTER FOR IMMUNOTHERAPY: Status: ACTIVE | Noted: 2025-01-30

## 2025-01-30 PROBLEM — Z79.899 ENCOUNTER FOR LONG-TERM (CURRENT) USE OF HIGH-RISK MEDICATION: Status: ACTIVE | Noted: 2025-01-30

## 2025-01-30 NOTE — PROGRESS NOTES
HEMATOLOGY ONCOLOGY OUTPATIENT FOLLOW UP       Patient name: Laura Higginbotham  : 1960  MRN: [id number]  Primary Care Physician: Laura Archer  Referring Physician: Laura Archer  Reason For Consult: right hilar adenocarcinoma of the lung      History of Present Illness:  Patient is a 64 y.o. PMH of smoking, skin cancer, arthritis, GERD who presents for newly diagnosed lung cancer.    -2024 Pt went to Dr Archer for increased nonproductive coughing and feeling more dysnpeic on exertion. Was dx with pleurisy and given prednisone which helped somewhat.  -2024 Since cough hadn't completely gone away in 6 weeks, patient went back to Dr. Archer who became concerned and ordered a CXR and antibiotics.  -CXR showed a right hilar mass, CT was ordered.    -2024 CT chest at [facility]: Revealed a very large right hilar mass measuring 9.2 x 9.5 cm in diameter with hypodensity centrally suggesting a necrotic center.  There is obstruction of the right upper lobe bronchus with the infiltrate seen extending peripheral to that area and marked elevation of the right hilum.  Mass extends into the right hilar lymph nodes.    Patient was seen in consultation by Dr. Crystal Powell, pulmonology. She denied having a cough, hemoptysis, wheezing, dyspnea, fever, unintentional weight loss.    -2020 for EBUS bronchoscopy by Dr. Crystal Powell with FNA to the right hilar node 10R and to the right hilar mass revealing inés revealing malignant cells favoring non-small cell.    Pathology showed in the right hilar mass IHC positive for CK7, TTF-1, and Napsin, negative for p63 and CK/6 consistent with pulmonary adenocarcinoma negative for CD56, chromogranin, synaptophysin excluding neuroendocrine component.  Per discussion with pathology: Passes from the right hilar mass were found to be acellular, viable tissue was from the lymph node biopsied and 1 cassette is available for future testing.  -Pneumocystis was negative,   respiratory culture showing rare growth of normal respiratory alexi with no S. aureus or Pseudomonas aeruginosa detected.,  Respiratory panel PCR negative, PRELIM: no acid-fast bacilli seen on concentrated smear at 1 week, No isolated fungus at 1 week,.  -8/30/2024 CBC showed WBC 11.53, hemoglobin 13.1, hematocrit 40.7 with essentially normal indices, platelets 332K    -9/12/2024 Patient presented for initial consultation with her niece reporting constant nonproductive cough, she still denies having hemoptysis, wheezing, dyspnea, fever, or unintentional weight loss, new bony pains, no new H/A or focal neuro deficit except right under arm new tingling/burning.  She has a history of smoking 1 PPD- started at 17, this summer cut down to 1/2 PPD mostly because of the coughing (47+ years).    -9/20/2024 MRI brain with without contrast with no acute intracranial process identified, findings suggestive of minimal chronic small vessel ischemic disease but no evidence of intracranial metastasis    -9/23/2025 Caris shows PD-L1 positive with TPS 80% by PDL1 test 22C3, with level 1 evidence for cemiplimab and pembrolizumab, positive by PD-L1 28-8 with level 1 evidence for nivolumab/ipilimumab combination, positive by PD-L1  with TC 1+, 50% with atezolizumab in the metastatic setting level 1 evidence, and PD-L1 positive and  positive TC 1+60% with a benefit of atezolizumab in the adjuvant setting, and cemiplimab level 1 evidence.  -ALK negative/fusion not detected by RNA in the tumor, BRAF mutation not detected, EGFR mutation not detected, MET variant transcript not detected, RET fusion not detected, ROS1 fusion not detected.  Tumor mutation burden was high at 18, microsatellite stable.  KRAS pG12V found and 61%, TP53 pE158I found in 56%, NFKBIA was amplified    -9/25/2024 NM PET/CT skull initial staging: Heterogeneous right upper lobe lung mass extending into the superior right lower lobe, encasing the right mainstem  bronchus, encasing and narrowing the right upper lobe bronchus, is hypermetabolic (mSUV 8.76), consistent with malignancy. It has heterogeneous areas of photopenia, suggesting internal necrosis. Pre-carinal lymph node measuring 14 mm short axis is FDG avid (mSUV 4.0), consistent with malignancy.  No hybrid avidity in the neck, abdomen, pelvis, or skeleton. No left hilar adenopathy. No suspicious left lung nodules. Normal heart size with coronary calcifications. Benign calcified mediastinal and left hilar lymph nodes are present.    -10/2/2024 Patient presents in follow up with her niece reporting better nonproductive cough and rib pain since being oxycodone low dose, she still denies having hemoptysis, wheezing, dyspnea, fever, or unintentional weight loss, new bony pains, no new H/A or focal neuro deficit except right under arm new tingling/burning. Got SIMMED yesterday for radiation.  She has a history of smoking 1 PPD- started at 17, still on 1/2 PPD - gum stuck to dentures.    -10/3/2024 B12 1000 mcg given IM  -10/10 s/p Mediport placement    -10/14/2024 started chemoradiation (cisplatin +pemetrexed) reporting still with nonproductive cough and rib pain since being oxycodone low dose. Picked up her nausea medications, got her port,  her Emla cream (ports still a little tender), started her folate soon as she got it.  Did get her chemo teach and got her B12 shot as well she is ready to go.    -11/4/2024 Follow up for C2 chemoradiation reporting nonproductive cough and rib pain both better since being oxycodone low dose, using stool softener and yogurt which has greatly helped with stooling appropriately (EOD), patient had a lot of nausea the first 9 days, needing the zofran Q8H that time, a lot of fatigue.  -11/22/2024.  Patient completed radiation therapy to a total dose of 60 Gray (2 Gray per fraction with total of 30 fractions (under Dr. Ivan Dasilva).    -12/2/2024 Follow up for C3 chemotherapy,  completed radiation last week.  She is reporting more productive cough (mostly clear and thick occ some green); reports when she is drinking warm liquids or cold liquids he is still having pain when she tries to swallow the temperature seems to go down her throat where she chokes a little bit and then seems to radiate over to the upper right side of her chest this is relatively new.  Denies having any fevers, chills, pleuritic chest pain, shortness of breath in fact breathing seems to be easier.  She feels wiped out and rib pain better since being oxycodone low dose, using stool softener and yogurt which has greatly helped with stooling appropriately (EOD), patient still had a lot of nausea the first 9 days, even with addition of Zyprexa, needing the zofran Q8H that time, and admits that she is still taking the dexamethasone as prescribed day before, day of, day after and those are proximal to starting day of.    -Reviewed the report of the CT chest done at [facility] a few days ago shows some improvement of the size of her tumor and no concern of PNA or TE fistula.     -12/19/2024 Patient presents in follow up to ensure she will be ready for C4 chemotherapy on 12/31.  She is reporting the farther she gets from radiation, the more improvement she has in the productive cough (mostly clear and thick occ some green); reports also some improvement in the sensation of when she is drinking warm liquids or cold liquids not having as frequent or as severe pain when she tries to swallow the temperature seems to go down her throat, nor is she choking as much or radiating- over to the upper right side of her chest.  Denies having any fevers, chills, pleuritic chest pain, shortness of breath in fact breathing seems to be easier.  Her energy is a littler better, and rib pain better since completion of radiation;   -medicare finally filling the next oxycodone low dose Rx ordered by Dr Dasilva (also has Narcan)  -using stool  softener and yogurt which has greatly helped with stooling appropriately (EOD)  -patient still had a lot of nausea the first 9 days, even with addition of Zyprexa (needs refill for C4), needing the zofran Q8H that time,   -still taking the dexamethasone as prescribed day before, day of, day after and those are proximal to starting day of (needs refill for C4).    -2025 Got COVID H/A sore throat, body aches-all better except for back- in the middle, down towards the bottom (never ran fever) Her original productive cough-(mostly clear and thick occ some green); got worse with COVID- still worse, still not better. No shortness of breath. Because of this, has missed her 4th and last dose of chemo. Still not feeling well enough for in person visit today.    Subjective:  -2025 Pt reports for first imfinzi, her prior COVID H/A sore throat, body aches-all better except for back-it is a different pain from what she had on presentation and still ongoing- in the middle, can radiate down towards the bottom, denies having any popping sensations or crunching feeling, denies any sciatica at this time.  Still can control her bowel or bladder and have good sensation below the pain. Requesting more pain medications.  - Her original productive cough-(mostly clear and thick occ some green-unchanged); got worse with COVID-slowly improving but not what it was prior to COVID. No shortness of breath.        Past Medical History:   Diagnosis Date    Arthritis     Cancer     skin cancer on leg right    GERD (gastroesophageal reflux disease)        Past Surgical History:   Procedure Laterality Date    BRONCHOSCOPY N/A 2024    Procedure: BRONCHOSCOPY WITH BRONCHIAL WASHING ,ENDOBRONCHIAL ULTRASOUND WITH FINE NEEDLE ASPIRATION X2 AREAS;  Surgeon: Crystal Powell MD;  Location: [facility] ENDOSCOPY;  Service: Pulmonary;  Laterality: N/A;     SECTION      x3    KNEE SURGERY Left     ORIF, screws and plates, has had Operation on it x  3         Current Outpatient Medications:     albuterol sulfate  (90 Base) MCG/ACT inhaler, Inhale 1 puff Every 4 (Four) Hours As Needed for Wheezing., Disp: , Rfl:     Breztri Aerosphere 160-9-4.8 MCG/ACT aerosol inhaler, Inhale 2 puffs 2 (Two) Times a Day., Disp: , Rfl:     famotidine (PEPCID) 20 MG tablet, Take 1 tablet by mouth 2 (Two) Times a Day., Disp: 30 tablet, Rfl: 2    gabapentin (NEURONTIN) 100 MG capsule, Take 1 capsule by mouth 2 (Two) Times a Day., Disp: , Rfl:     Glucose Blood (Blood Glucose Test Strips 333) strip, Check sugar twice a day and PRN and keep a log In Vitro, Disp: , Rfl:     lidocaine-prilocaine (EMLA) 2.5-2.5 % cream, Apply 1 Application topically to the appropriate area as directed Every 2 (Two) Hours As Needed for Mild Pain. To port site before chemo/blood draw, Disp: 30 g, Rfl: 2    naloxone (NARCAN) 4 MG/0.1ML nasal spray, Call 911. Don't prime. [place] in 1 nostril for overdose. Repeat in 2-3 minutes in other nostril if no or minimal breathing/responsiveness., Disp: 2 each, Rfl: 0    ondansetron ODT (ZOFRAN-ODT) 8 MG disintegrating tablet, Take 1 tablet by mouth Every 8 (Eight) Hours As Needed for Nausea or Vomiting., Disp: 45 tablet, Rfl: 3    oxyCODONE (ROXICODONE) 5 MG/5ML solution, Take 7.5 mL by mouth Every 4 (Four) Hours As Needed for Moderate Pain., Disp: 300 mL, Rfl: 0    prochlorperazine (COMPAZINE) 10 MG tablet, Take 1 tablet by mouth Every 6 (Six) Hours As Needed for Nausea or Vomiting., Disp: 60 tablet, Rfl: 1    folic acid (Folate) 400 MCG tablet, Take 1 tablet by mouth Daily for 270 days. (Patient not taking: Reported on 2/4/2025), Disp: 90 tablet, Rfl: 2    metFORMIN (GLUCOPHAGE) 1000 MG tablet, Every 12 (Twelve) Hours. (Patient not taking: Reported on 2/4/2025), Disp: , Rfl:     oxyCODONE (Roxicodone) 5 MG immediate release tablet, Take 1.5 tablets by mouth Every 8 (Eight) Hours As Needed for Moderate Pain for up to 14 days., Disp: 65 tablet, Rfl: 0    No  Known Allergies    Family History   Problem Relation Age of Onset    Lung cancer Brother         Lung cancer/Leukemia    Cancer Brother        Cancer-related family history includes Cancer in her brother; Lung cancer in her brother.    Social History     Tobacco Use    Smoking status: Every Day     Current packs/day: 0.50     Average packs/day: 0.5 packs/day for 48.1 years (24.0 ttl pk-yrs)     Types: Cigarettes     Start date: 1977    Smokeless tobacco: Never   Vaping Use    Vaping status: Never Used   Substance Use Topics    Alcohol use: Not Currently    Drug use: Never     Social History     Social History Narrative    Not on file      Review of Systems:  Constitutional: +fatigue (slightly improved), Denies any weakness, lack of appetite, excessive appetite, weight change, chills or fever.  Eyes: Reports no blurry vision, no double vision, no pain in the eyes, dry eyes or tearing.  ENT: Reports no decreased hearing capacity, ear pain or tinnitus. Denies any epistaxis, nasal congestion, mouth sores or bleeding, tooth or jaw pain, sore throat or hoarseness.  Respiratory: + chronic cough (better with oxy)  Denies any sputum production or hemoptysis. There is no wheezing, shortness of breath or any other respiratory complaints.  Cardiovascular: + shortness of breath with activity (slightly improved), Denies any chest pain, shortness of breath when lying down, palpitations, or leg swelling.  Breasts: Denies any lumps, bumps, pain, nipple changes or discharge in the breasts.  Gastrointestinal: +painful swallowing, nausea, vomiting, constipation (better per HPI) There are no complaints of abdominal pain, difficulty swallowing or anorexia, diarrhea,  heartburn, blood in the stools, dark stools, or change in bowel habits or hemorrhoids.  Genitourinary: Denies any pain or burning on urination, blood in the urine or frequent urination.   Musculoskeletal: + worsening mid back pain, Denies painful joints, no swelling of the  "joints, muscle pain. Denies any pain or tingling in the legs, hands or feet.  Neuropsychiatric: Denies any nervousness, depressed mood, headaches, blackouts, dizziness, weakness of limbs, loss of sensation, loss of balance, loss of coordination or difficulty in speaking.  Cutaneous: Denies any dry skin, itching, rash, change in the color of the skin, or any other cutaneous complaints.  Hematologic/Lymphatic: Denies any bruising or bleeding. Report no recent development of palpable or painful lymph nodes.  Allergy/Immunology: Denies rash/hayfever symptoms or any recent history of pneumonia, recurrent sinusitis, urinary infection or any other history of an ongoing infection.  Endocrine: Reports no intolerance to heat or cold, excessive thirst or excessive urination.    Objective:  Vital signs:  Vitals:    25 1232   BP: 124/83   Pulse: 100   Temp: 97.6 °F (36.4 °C)   TempSrc: Oral   SpO2: 99%   Weight: 71.8 kg (158 lb 3.2 oz)   Height: 172.7 cm (67.99\")   PainSc:   8   PainLoc: Back     Body mass index is 24.06 kg/m².      Physical Exam:   ECO  GENERAL: The patient is a pleasant middle aged white female who appears their stated age and is awake, appears tired but in no acute distress, alert and oriented x3.  SKIN: No rash or ulcers.  HEME/LYMPHATICS: No bruising or petechiae on visual inspection. No lymphadenopathy on visual inspection and palpation of cervical, supraclavicular, infraclavicular lymph nodes.  HEAD/FACE: atraumatic and normocephalic. Normal hair.  EYES: PERRLA/EOMI. No scleral icterus. No tearing or dry eyes.  ENT: External ears normal with no evidence of discharge. No evidence of ulceration or bleeding in the nostrils. Mouth has no ulcers, bleeding or inflammation of the gums, floor or roof of the mouth with normal dentition.  NECK: Supple, symmetric.   CHEST/RESPIRATORY: Expansion maintained bilaterally and symmetrically. On auscultation, clear breath sounds in left lung, decreased breath " sounds in upper right lung field but even better than last visit. No wheezes, rhonchi or rales.  BREAST: Deferred.  CARDIOVASCULAR: On auscultation, regular rate and rhythm. No murmurs, gallops or rubs are heard.  GASTROINTESTINAL/ABDOMEN: Symmetric. On auscultation of the abdomen, there are normal bowel sounds in all four quadrants. There are no surgical scars in the abdomen. Nontender to palpation.  GENITOURINARY: Deferred.  NEUROLOGIC: Alert and oriented time, person, and place, with no apparent changes in recent or remote memory. Cranial nerves II-XII are grossly intact. Sensation is maintained in the extremities. Strength and tone appear normal for age and equal in the extremities. Gait is normal.  MUSCULOSKELETAL: + TTP on midback for most of thoracic spine and paraspinous mm. No cyanosis, clubbing or edema in the extremities. There are no surgical scars on the extremities.  PSYCHIATRIC: The patient maintains judgment and has good insight. The patient has no changes in mood or affection.  IV: right chest Mediport non-accessed, with emla cream still on.    Lab Results - Last 18 Months   Lab Units 02/04/25  1240 12/02/24  0815 11/04/24  0747   WBC 10*3/mm3 6.50 5.38 8.85   HEMOGLOBIN g/dL 12.3 10.9* 11.5*   HEMATOCRIT % 38.5 33.0* 35.1   PLATELETS 10*3/mm3 271 334 400   MCV fL 92.8 88.0 85.2     Lab Results - Last 18 Months   Lab Units 02/04/25  1240 12/02/24  0815 11/04/24  0747   SODIUM mmol/L 138 132* 133*   POTASSIUM mmol/L 4.1 4.6 4.2   CHLORIDE mmol/L 102 97* 96*   CO2 mmol/L 26.4 21.8* 25.9   BUN mg/dL 14 12 12   CREATININE mg/dL 0.84 0.78 0.66   CALCIUM mg/dL 10.0 9.8 10.1   BILIRUBIN mg/dL <0.2 0.2 0.2   ALK PHOS U/L 91 106 109   ALT (SGPT) U/L 18 15 12   AST (SGOT) U/L 15 15 11   GLUCOSE mg/dL 127* 406* 322*       Lab Results   Component Value Date    GLUCOSE 127 (H) 02/04/2025    BUN 14 02/04/2025    CREATININE 0.84 02/04/2025    BCR 16.7 02/04/2025    K 4.1 02/04/2025    CO2 26.4 02/04/2025     "CALCIUM 10.0 02/04/2025    ALBUMIN 4.1 02/04/2025    AST 15 02/04/2025    ALT 18 02/04/2025       Lab Results - Last 18 Months   Lab Units 10/10/24  0810 08/26/24  0945   INR  <0.93* 0.93   APTT seconds 30.8 27.1       No results found for: \"IRON\", \"TIBC\", \"FERRITIN\"    No results found for: \"FOLATE\"    No results found for: \"OCCULTBLD\"    No results found for: \"RETICCTPCT\"  No results found for: \"JPPKTXEI92\"  No results found for: \"SPEP\", \"UPEP\"  No results found for: \"LDH\", \"URICACID\"  No results found for: \"EBBETO\", \"RF\", \"SEDRATE\"  No results found for: \"FIBRINOGEN\", \"HAPTOGLOBIN\"  Lab Results   Component Value Date    PTT 30.8 10/10/2024    INR <0.93 (L) 10/10/2024     No results found for: \"\"  No results found for: \"CEA\"  No components found for: \"CA-19-9\"  No results found for: \"PSA\"  No results found for: \"SEDRATE\"    Assessment & Plan     64 y.o. female with PMH of smoking, skin cancer, arthritis, GERD who presented 8/2024  for newly diagnosed lung cancer, s/p definitive chemoradiation (completed.    Right hilar mass/right upper lobe adenocarcinoma, at minimum T4 N2 (IIIB)     Previously discussed the above results which include imaging and pathology with the patient.  -Staging PET/CT no metastatic disease but confirmed IIIB disease, nonsurgical candidate-previously discussed this in detail with the patient and her family and explained that the standard of care at this point would be chemoradiation with curative intent.  Discussed the couple chemotherapy regimens that can be used for radiation.  -Staging brain MRI negative for metastasis    -Radiology Oncology on board: pt completed her concurrent chemoradiation -11/22/2024  -PFTs from pulmonologist done per patient- per patient 50-60%    -Biomarker testing +PD-L1 testing (category 1), +KRAS G12C, negative EGFR mutation including exon 19 del, L858R and other deletions, exon 20 insertions, (category 1), ALK fusions (category 1), ROS1 fusions, BRAF V600E, " NTRK 1/2/3 fusions, MET exon 14 skipping or amplifications, RET fusions, ErbB2 (HER2) alterations,    - s/p B12 shot one week prior to chemo  - folic acid 1 mg daily and informed pt to start taking immediately-can stop approx one month after end of chemotherapy**     -10/10/24 s/p Mediport placement with EMLA cream available  Didn't get cycle 4 chemotherapy on 12/31/2024 given COVID    -12/18/2024 [facility] CT chest scan already showing improvement in lung cancer size with no signs of progression near end of radiation completion showing improvement in lung cancer. Cannot do repeat imaging at this time given recent COVID infection (has been known to make lung nodules on imaging)  -1/16/25 Discussed durvalumab maintenance therapy every 2-4 weeks for one year per NCCN guidelines and FDA approval. We discussed that from the [facility] trial data, in stage III NSCLC nonresectable patients that receive durvalumab post definitive chemoradiation vs. those that did not, have a 48% reduction in risk of progression. 5 year post hoc analysis showed median overall survival with durvalumab was 47.5 months vs. 29.1 months with placebo. HR 0.72. We previously discussed that durvalumab is a PD-L1 inhibitor and classified as immunotherapy. Patient was provided a [facility] information sheet on Durvalumab. We discussed side effects including but not limited to immune mediated effects such as pneumonitis, colitis and hepatitis as well as nausea, diarrhea, fatigue, electrolyte abnormalities and infusion reactions.    PLAN: cisplatin plus pemetrexed Q21 days X 3 cycles with concurrent radiation with repeat imaging showing improvement no progression (completed), followed by durvalumab 1500 mg Q4 weeks x 12 cycles (category 1 for stage III)     -will get labs every cycle, already has port, will order to get PA, RTC for C1  - CT scan scheduled for 2/24/2024 by [facility] with f/u 3 days later    -precert approved 1/17/25, given labs within  "acceptable parameters and patient H&P within acceptable parameters, proceed with C1 today and RTC for RT2 in 4 weeks with labs for C2D1      -*Data has shown a Palliative consult to help manage symptoms early on in care for advanced stage lung cancer patients.      2. therapy associated nausea/vomiting (chemo completed)  -has needed Zofran (using Q8H d 2-11)- added compazine; suspect with immunotherapy will not be as difficult    3. Tobacco dependence  -tried nicotine gum but stuck to dentures, is 1/2 ppd (but only 1/2 a cigarette when she smokes)    4.  Hyperglycemia, noted after start of treatment to 300s  -Patient started on 1000 mg metformin daily by PCP, BS still over 300 today, patient had eaten.  -Continue to monitor; patient has had metformin changed around a bit, says she has follow-up with PCP coming    5. Worsening mid-level back pain, sub acute  -2/4/2025 patient reporting this is not the same kind of pain she had on presentation (with the local invasion of the lung cancer into local tissues), this is a new or, getting more consistent aching pain \"I almost feel like I need to stretch or pull\" and she went through all of the medication Dr. Dasilva had given her.  -Was previously getting Светлана 7.5 liquid which she was using 1-2 times a day, occasionally up to 3 times a day.  Ordered Светлана 7.5 mg (1.5 tablets) every 8 hours as needed for pain, 14-day supply  -Patient already knows about bowel regimens do not get constipated  -Ordered NM bone scan to be done, suspect it will get done before the CT scans later this month.    Thank you very much for providing the opportunity to participate in this patient’s care. Please do not hesitate to call if there are any other questions.  "

## 2025-02-03 DIAGNOSIS — C77.1 NON-SMALL CELL LUNG CANCER METASTATIC TO INTRATHORACIC LYMPH NODE: ICD-10-CM

## 2025-02-03 DIAGNOSIS — C34.90 NON-SMALL CELL LUNG CANCER METASTATIC TO INTRATHORACIC LYMPH NODE: ICD-10-CM

## 2025-02-03 DIAGNOSIS — C34.11 MALIGNANT NEOPLASM OF UPPER LOBE OF RIGHT LUNG: Primary | ICD-10-CM

## 2025-02-04 ENCOUNTER — HOSPITAL ENCOUNTER (OUTPATIENT)
Dept: ONCOLOGY | Facility: HOSPITAL | Age: 65
Discharge: HOME | End: 2025-02-04
Payer: MEDICARE

## 2025-02-04 ENCOUNTER — OFFICE VISIT (OUTPATIENT)
Dept: PHARMACY | Facility: HOSPITAL | Age: 65
End: 2025-02-04
Payer: MEDICARE

## 2025-02-04 ENCOUNTER — OFFICE VISIT (OUTPATIENT)
Dept: ONCOLOGY | Facility: CLINIC | Age: 65
End: 2025-02-04
Payer: MEDICARE

## 2025-02-04 VITALS
TEMPERATURE: 97.6 F | OXYGEN SATURATION: 99 % | SYSTOLIC BLOOD PRESSURE: 124 MMHG | WEIGHT: 158.2 LBS | DIASTOLIC BLOOD PRESSURE: 83 MMHG | BODY MASS INDEX: 23.98 KG/M2 | HEIGHT: 68 IN | HEART RATE: 100 BPM

## 2025-02-04 DIAGNOSIS — C34.11 MALIGNANT NEOPLASM OF UPPER LOBE OF RIGHT LUNG: ICD-10-CM

## 2025-02-04 DIAGNOSIS — C34.90 NON-SMALL CELL LUNG CANCER METASTATIC TO INTRATHORACIC LYMPH NODE: ICD-10-CM

## 2025-02-04 DIAGNOSIS — C77.1 NON-SMALL CELL LUNG CANCER METASTATIC TO INTRATHORACIC LYMPH NODE: ICD-10-CM

## 2025-02-04 DIAGNOSIS — C34.11 MALIGNANT NEOPLASM OF UPPER LOBE OF RIGHT LUNG: Primary | ICD-10-CM

## 2025-02-04 DIAGNOSIS — R11.2 CHEMOTHERAPY INDUCED NAUSEA AND VOMITING: ICD-10-CM

## 2025-02-04 DIAGNOSIS — M54.6 ACUTE BILATERAL THORACIC BACK PAIN: ICD-10-CM

## 2025-02-04 DIAGNOSIS — Z79.899 ENCOUNTER FOR LONG-TERM (CURRENT) USE OF HIGH-RISK MEDICATION: Primary | ICD-10-CM

## 2025-02-04 DIAGNOSIS — T45.1X5A CHEMOTHERAPY INDUCED NAUSEA AND VOMITING: ICD-10-CM

## 2025-02-04 DIAGNOSIS — Z29.89 ENCOUNTER FOR IMMUNOTHERAPY: ICD-10-CM

## 2025-02-04 DIAGNOSIS — Z51.81 ENCOUNTER FOR THERAPEUTIC DRUG MONITORING: ICD-10-CM

## 2025-02-04 LAB
ALBUMIN SERPL-MCNC: 4.1 G/DL (ref 3.5–5.2)
ALBUMIN/GLOB SERPL: 1.3 G/DL
ALP SERPL-CCNC: 91 U/L (ref 39–117)
ALT SERPL W P-5'-P-CCNC: 18 U/L (ref 1–33)
ANION GAP SERPL CALCULATED.3IONS-SCNC: 9.6 MMOL/L (ref 5–15)
AST SERPL-CCNC: 15 U/L (ref 1–32)
BASOPHILS # BLD AUTO: 0.02 10*3/MM3 (ref 0–0.2)
BASOPHILS NFR BLD AUTO: 0.3 % (ref 0–1.5)
BILIRUB SERPL-MCNC: <0.2 MG/DL (ref 0–1.2)
BUN SERPL-MCNC: 14 MG/DL (ref 8–23)
BUN/CREAT SERPL: 16.7 (ref 7–25)
CALCIUM SPEC-SCNC: 10 MG/DL (ref 8.6–10.5)
CHLORIDE SERPL-SCNC: 102 MMOL/L (ref 98–107)
CO2 SERPL-SCNC: 26.4 MMOL/L (ref 22–29)
CREAT SERPL-MCNC: 0.84 MG/DL (ref 0.57–1)
DEPRECATED RDW RBC AUTO: 49.3 FL (ref 37–54)
EGFRCR SERPLBLD CKD-EPI 2021: 77.2 ML/MIN/1.73
EOSINOPHIL # BLD AUTO: 0.06 10*3/MM3 (ref 0–0.4)
EOSINOPHIL NFR BLD AUTO: 0.9 % (ref 0.3–6.2)
ERYTHROCYTE [DISTWIDTH] IN BLOOD BY AUTOMATED COUNT: 14.9 % (ref 12.3–15.4)
GLOBULIN UR ELPH-MCNC: 3.2 GM/DL
GLUCOSE SERPL-MCNC: 127 MG/DL (ref 65–99)
HCT VFR BLD AUTO: 38.5 % (ref 34–46.6)
HGB BLD-MCNC: 12.3 G/DL (ref 12–15.9)
LYMPHOCYTES # BLD AUTO: 1.26 10*3/MM3 (ref 0.7–3.1)
LYMPHOCYTES NFR BLD AUTO: 19.4 % (ref 19.6–45.3)
MAGNESIUM SERPL-MCNC: 2.1 MG/DL (ref 1.6–2.4)
MCH RBC QN AUTO: 29.6 PG (ref 26.6–33)
MCHC RBC AUTO-ENTMCNC: 31.9 G/DL (ref 31.5–35.7)
MCV RBC AUTO: 92.8 FL (ref 79–97)
MONOCYTES # BLD AUTO: 0.61 10*3/MM3 (ref 0.1–0.9)
MONOCYTES NFR BLD AUTO: 9.4 % (ref 5–12)
NEUTROPHILS NFR BLD AUTO: 4.55 10*3/MM3 (ref 1.7–7)
NEUTROPHILS NFR BLD AUTO: 70 % (ref 42.7–76)
PLATELET # BLD AUTO: 271 10*3/MM3 (ref 140–450)
PMV BLD AUTO: 8.1 FL (ref 6–12)
POTASSIUM SERPL-SCNC: 4.1 MMOL/L (ref 3.5–5.2)
PROT SERPL-MCNC: 7.3 G/DL (ref 6–8.5)
RBC # BLD AUTO: 4.15 10*6/MM3 (ref 3.77–5.28)
SODIUM SERPL-SCNC: 138 MMOL/L (ref 136–145)
T4 FREE SERPL-MCNC: 1.07 NG/DL (ref 0.93–1.7)
TSH SERPL DL<=0.05 MIU/L-ACNC: 1.81 UIU/ML (ref 0.27–4.2)
WBC NRBC COR # BLD AUTO: 6.5 10*3/MM3 (ref 3.4–10.8)

## 2025-02-04 PROCEDURE — 83735 ASSAY OF MAGNESIUM: CPT | Performed by: INTERNAL MEDICINE

## 2025-02-04 PROCEDURE — 1125F AMNT PAIN NOTED PAIN PRSNT: CPT | Performed by: INTERNAL MEDICINE

## 2025-02-04 PROCEDURE — 96413 CHEMO IV INFUSION 1 HR: CPT

## 2025-02-04 PROCEDURE — 99214 OFFICE O/P EST MOD 30 MIN: CPT | Performed by: INTERNAL MEDICINE

## 2025-02-04 PROCEDURE — 84443 ASSAY THYROID STIM HORMONE: CPT | Performed by: INTERNAL MEDICINE

## 2025-02-04 PROCEDURE — 25010000002 DURVALUMAB 50 MG/ML SOLUTION 10 ML VIAL: Performed by: INTERNAL MEDICINE

## 2025-02-04 PROCEDURE — 85025 COMPLETE CBC W/AUTO DIFF WBC: CPT | Performed by: INTERNAL MEDICINE

## 2025-02-04 PROCEDURE — 25010000002 HEPARIN LOCK FLUSH PER 10 UNITS: Performed by: INTERNAL MEDICINE

## 2025-02-04 PROCEDURE — 36415 COLL VENOUS BLD VENIPUNCTURE: CPT | Performed by: INTERNAL MEDICINE

## 2025-02-04 PROCEDURE — 25810000003 SODIUM CHLORIDE 0.9 % SOLUTION 250 ML FLEX CONT: Performed by: INTERNAL MEDICINE

## 2025-02-04 PROCEDURE — 80053 COMPREHEN METABOLIC PANEL: CPT | Performed by: INTERNAL MEDICINE

## 2025-02-04 PROCEDURE — 84439 ASSAY OF FREE THYROXINE: CPT | Performed by: INTERNAL MEDICINE

## 2025-02-04 RX ORDER — OXYCODONE HYDROCHLORIDE 5 MG/1
7.5 TABLET ORAL EVERY 8 HOURS PRN
Qty: 65 TABLET | Refills: 0 | Status: SHIPPED | OUTPATIENT
Start: 2025-02-04 | End: 2025-02-18

## 2025-02-04 RX ORDER — HEPARIN SODIUM (PORCINE) LOCK FLUSH IV SOLN 100 UNIT/ML 100 UNIT/ML
500 SOLUTION INTRAVENOUS AS NEEDED
OUTPATIENT
Start: 2025-02-04

## 2025-02-04 RX ORDER — SODIUM CHLORIDE 0.9 % (FLUSH) 0.9 %
10 SYRINGE (ML) INJECTION AS NEEDED
OUTPATIENT
Start: 2025-02-04

## 2025-02-04 RX ORDER — SODIUM CHLORIDE 0.9 % (FLUSH) 0.9 %
10 SYRINGE (ML) INJECTION AS NEEDED
Status: DISCONTINUED | OUTPATIENT
Start: 2025-02-04 | End: 2025-02-05 | Stop reason: HOSPADM

## 2025-02-04 RX ORDER — SODIUM CHLORIDE 9 MG/ML
20 INJECTION, SOLUTION INTRAVENOUS ONCE
Status: CANCELLED | OUTPATIENT
Start: 2025-02-04

## 2025-02-04 RX ORDER — SODIUM CHLORIDE 9 MG/ML
20 INJECTION, SOLUTION INTRAVENOUS ONCE
Status: DISCONTINUED | OUTPATIENT
Start: 2025-02-04 | End: 2025-02-05 | Stop reason: HOSPADM

## 2025-02-04 RX ORDER — HEPARIN SODIUM (PORCINE) LOCK FLUSH IV SOLN 100 UNIT/ML 100 UNIT/ML
500 SOLUTION INTRAVENOUS AS NEEDED
Status: DISCONTINUED | OUTPATIENT
Start: 2025-02-04 | End: 2025-02-05 | Stop reason: HOSPADM

## 2025-02-04 RX ADMIN — SODIUM CHLORIDE 1500 MG: 9 INJECTION, SOLUTION INTRAVENOUS at 14:58

## 2025-02-04 RX ADMIN — HEPARIN 500 UNITS: 100 SYRINGE at 16:07

## 2025-02-04 RX ADMIN — Medication 10 ML: at 16:07

## 2025-02-04 NOTE — PROGRESS NOTES
Pharmacy Patient Education Note          Laura Higginbotham is a 65 y.o. female being seen at [facility]  by Dr. Huynh for a diagnosis of Non-small cell lung cancer with a plan to begin treatment with Durvalumab. Pharmacist reviewed regimen, as detailed below, with patient.     The discussion included the dose, route, and frequency of administration. Most common side and clinically significant effects were discussed including   Fatigue, rash/itchy skin,  cough/shortness of breath, diarrhea, changes in liver function, changes in electrolyte levels, thyroid dysfunction, muscle or joint pain and other immune mediated side effects.       Patient was counseled on when to notify a provider including in the event of the following:  Signs and symptoms of infection, including temperature >100.4  Signs and symptoms of serious bleeding including blood in the urine or stool  Changes in urinary output  Frequent nausea/vomiting or diarrhea or severe abdominal pain  Development of mouth sores or ulcerations  The patient was provided with general information on when to call the office for adverse events.     Patient provided with handout regarding medications, and reviewed general plan for immunotherapy administration. Patient engaged in counseling throughout. Allowed time for patient to ask questions. Patient without any further questions at this time and verbalized understanding.    Trish Zamora RPH  14:44 EST

## 2025-02-04 NOTE — PATIENT INSTRUCTIONS
Pain med sent in  NM bone scan, likely done before your scheduled Cts and RadOnc follow up  RTC in 4 weeks with labs prior for 2nd immunotherapy    Call us if fevers or change in sputum, new rash, worsening shortness of breath, new diarrhea, anything wierd

## 2025-02-04 NOTE — PROGRESS NOTES
Pt came from seeing Dr. Huynh for C1D1 imfinzi with labs already drawn. Port accessed and flushed with good blood return noted. No labs collected. Port flushed with saline and heparin prior to needle removal. Susan came and did teaching. Patient tolerated treatment and was discharged with AVS.

## 2025-02-05 ENCOUNTER — PATIENT OUTREACH (OUTPATIENT)
Dept: ONCOLOGY | Facility: CLINIC | Age: 65
End: 2025-02-05
Payer: MEDICARE

## 2025-02-13 ENCOUNTER — HOSPITAL ENCOUNTER (OUTPATIENT)
Dept: NUCLEAR MEDICINE | Facility: HOSPITAL | Age: 65
Discharge: HOME | End: 2025-02-13
Payer: MEDICARE

## 2025-02-13 DIAGNOSIS — M54.6 ACUTE BILATERAL THORACIC BACK PAIN: ICD-10-CM

## 2025-02-13 DIAGNOSIS — C34.90 NON-SMALL CELL LUNG CANCER METASTATIC TO INTRATHORACIC LYMPH NODE: ICD-10-CM

## 2025-02-13 DIAGNOSIS — C77.1 NON-SMALL CELL LUNG CANCER METASTATIC TO INTRATHORACIC LYMPH NODE: ICD-10-CM

## 2025-02-13 DIAGNOSIS — C34.11 MALIGNANT NEOPLASM OF UPPER LOBE OF RIGHT LUNG: ICD-10-CM

## 2025-02-13 PROCEDURE — 78306 BONE IMAGING WHOLE BODY: CPT

## 2025-02-13 RX ORDER — TC 99M MEDRONATE 20 MG/10ML
2626 INJECTION, POWDER, LYOPHILIZED, FOR SOLUTION INTRAVENOUS
Status: COMPLETED | OUTPATIENT
Start: 2025-02-13 | End: 2025-02-13

## 2025-02-13 RX ADMIN — TC 99M MEDRONATE 2626 MILLICURIE: 20 INJECTION, POWDER, LYOPHILIZED, FOR SOLUTION INTRAVENOUS at 10:39

## 2025-02-24 ENCOUNTER — HOSPITAL ENCOUNTER (OUTPATIENT)
Dept: PET IMAGING | Facility: HOSPITAL | Age: 65
Discharge: HOME | End: 2025-02-24
Admitting: INTERNAL MEDICINE
Payer: MEDICARE

## 2025-02-24 DIAGNOSIS — C34.11 PRIMARY ADENOCARCINOMA OF UPPER LOBE OF RIGHT LUNG: ICD-10-CM

## 2025-02-24 DIAGNOSIS — C77.1 NON-SMALL CELL LUNG CANCER METASTATIC TO INTRATHORACIC LYMPH NODE: ICD-10-CM

## 2025-02-24 DIAGNOSIS — C34.90 NON-SMALL CELL LUNG CANCER METASTATIC TO INTRATHORACIC LYMPH NODE: ICD-10-CM

## 2025-02-24 PROCEDURE — 71260 CT THORAX DX C+: CPT

## 2025-02-24 PROCEDURE — 25510000001 IOPAMIDOL PER 1 ML: Performed by: INTERNAL MEDICINE

## 2025-02-24 PROCEDURE — 74177 CT ABD & PELVIS W/CONTRAST: CPT

## 2025-02-24 RX ORDER — IOPAMIDOL 755 MG/ML
100 INJECTION, SOLUTION INTRAVASCULAR
Status: COMPLETED | OUTPATIENT
Start: 2025-02-24 | End: 2025-02-24

## 2025-02-24 RX ADMIN — IOPAMIDOL 100 ML: 755 INJECTION, SOLUTION INTRAVENOUS at 09:19

## 2025-02-26 NOTE — PROGRESS NOTES
[facility] RADIATION ONCOLOGY  FOLLOW-UP    Name: Laura Higginbotham  YOB: 1960  MRN #: [id number]  Date of Service: 3/1/2025    Chief Complaint:      Diagnosis:   Encounter Diagnosis   Name Primary?    Malignant neoplasm of upper lobe of right lung Yes        Radiation Treatment Course     Treating Physician: Dr. Ivan Dasilva     Start: 10/14/2024     End: 11/22/2024   Site: Right upper lobe    Total Dose: 6000 cGy    Dose/Fraction: 200 cGy    Total Fractions: 30 daily Daily or BID: Daily  Modality: Photon  Technique: IMRT/VMAT/Rapid Arc  Bolus: No       Interval History     Laura Higginbotham is a 65 y.o. female who was diagnosed with stage IIIb (cT4 cN2 cM0) right upper lobe lung adenocarcinoma in August 2024.  She completed definitive chemoradiation on 11/22/2024.  She returns to our clinic today for 3-month follow-up and interval imaging review.    2/13/2025: Bone scan showed increased uptake in the right third rib posteriorly times corresponding the area of tumor invasion locally in the right upper lobe, no evidence of distant metastatic disease.    2/24/2025: CT CAP showed the RUL mass has mildly decreased in size from prior study, mass continues to extend into posterior right third rib with erosive changes, no new chest wall involvement, decreased size of paratracheal lymph node and no new intrathoracic adenopathy, new mild superior endplate compression fracture at T2, mild T4 compression fracture unchanged, right port catheter is coiled in the IJ with fibrin sheath or clot surrounding catheter, no metastatic findings in the abdomen or pelvis.     Patient reports new and increasing upper back pain. She is getting pain medicine from Dr. Huynh. She is here to review recent imaging.       Imaging     CT CAP With Contrast Diagnostic findings  [facility]   Result Date: 2/24/2025  Impression: Chest: 1. Right upper lobe mass extending to right hilum and involving the superior segment of the  right lower lobe consistent with malignancy mildly decreased in size from prior study. 2. Mass again extends to involve posterior right third rib with erosive changes. No new chest wall involvement. 3. Decrease in size of pretracheal lymph node. No new intrathoracic adenopathy. 4. New mild superior endplate compression fracture at T2. Mild T4 compression fracture unchanged. 5. Right port catheter coiled in internal jugular vein with small amount of fibrin sheath or clot surrounding catheter similar to the prior study. 6. Additional incidental findings above. Abdomen and pelvis: 1. No findings of metastatic disease in the abdomen or pelvis. 2. Hepatomegaly and additional incidental findings above. Electronically Signed: Jostin Albert MD  2/26/2025 8:59 AM EST  Workstation ID: [id number]    NM Bone Scan Whole Body  [facility]   Result Date: 2/13/2025  Impression: 1. Increased uptake in the right third rib posteriorly corresponding to the area of tumor invasion locally in the right upper lobe. No evidence of distant metastatic disease. Electronically Signed: Russell Silva MD  2/18/2025 10:29 AM EST  Workstation ID: [id number]      Pathology     No new pathology to review.      Labs     Hematology:  WBC   Date Value Ref Range Status   02/04/2025 6.50 3.40 - 10.80 10*3/mm3 Final     RBC   Date Value Ref Range Status   02/04/2025 4.15 3.77 - 5.28 10*6/mm3 Final     Hemoglobin   Date Value Ref Range Status   02/04/2025 12.3 12.0 - 15.9 g/dL Final     Hematocrit   Date Value Ref Range Status   02/04/2025 38.5 34.0 - 46.6 % Final     Platelets   Date Value Ref Range Status   02/04/2025 271 140 - 450 10*3/mm3 Final     Chemistry:  Lab Results   Component Value Date    GLUCOSE 127 (H) 02/04/2025    BUN 14 02/04/2025    CREATININE 0.84 02/04/2025    BCR 16.7 02/04/2025    K 4.1 02/04/2025    CO2 26.4 02/04/2025    CALCIUM 10.0 02/04/2025    ALBUMIN 4.1 02/04/2025    AST 15 02/04/2025    ALT 18 02/04/2025       Problem List      Patient Active Problem List   Diagnosis    Malignant neoplasm of upper lobe of right lung    Moderate major depression, single episode    Organic anxiety disorder    Polyneuropathy    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    Chemotherapy-induced nausea    Choking    Encounter for therapeutic drug monitoring    Encounter for immunotherapy    Encounter for long-term (current) use of high-risk medication        Current Medications     Current Outpatient Medications   Medication Instructions    albuterol sulfate  (90 Base) MCG/ACT inhaler 1 puff, Every 4 Hours PRN    Breztri Aerosphere 160-9-4.8 MCG/ACT aerosol inhaler 2 puffs, 2 Times Daily    famotidine (PEPCID) 20 mg, Oral, 2 Times Daily    folic acid (FOLATE) 400 mcg, Oral, Daily    gabapentin (NEURONTIN) 100 mg, 2 Times Daily - RT    Glucose Blood (Blood Glucose Test Strips 333) strip Check sugar twice a day and PRN and keep a log In Vitro    lidocaine-prilocaine (EMLA) 2.5-2.5 % cream 1 Application, Topical, Every 2 Hours PRN, To port site before chemo/blood draw    metFORMIN (GLUCOPHAGE) 1000 MG tablet Every 12 Hours Scheduled    naloxone (NARCAN) 4 MG/0.1ML nasal spray Call 911. Don't prime. [place] in 1 nostril for overdose. Repeat in 2-3 minutes in other nostril if no or minimal breathing/responsiveness.    ondansetron ODT (ZOFRAN-ODT) 8 mg, Oral, Every 8 Hours PRN    oxyCODONE (ROXICODONE) 7.5 mg, Oral, Every 4 Hours PRN    prochlorperazine (COMPAZINE) 10 mg, Oral, Every 6 Hours PRN        Allergies     No Known Allergies      Review of Systems     Review of Systems   Constitutional:  Negative for activity change and fatigue.   Musculoskeletal:  Positive for back pain.      Vitals:    02/27/25 0855   BP: 114/74   Pulse: 97   Resp: 18   SpO2: 97%      Physical Exam  Constitutional:       General: She is not in acute distress.  HENT:      Head: Normocephalic and atraumatic.   Pulmonary:      Effort: Pulmonary effort is  normal. No respiratory distress.   Musculoskeletal:      Comments: Back pain reported at midline around the upper thoracic spine.    Neurological:      Mental Status: She is alert and oriented to person, place, and time. Mental status is at baseline.   Psychiatric:         Mood and Affect: Mood normal.         Behavior: Behavior normal.          ECOG:  Restricted in physically strenuous activity but ambulatory and able to carry out work of a light or sedentary nature, e.g., light house work, office work = 1        Assessment and Plan     Assessment:      Laura Higginbotham is a 65 y.o. female who was diagnosed with stage IIIb (cT4 cN2 cM0) right upper lobe lung adenocarcinoma in August 2024.  She completed definitive chemoradiation on 11/22/2024.  She returns to our clinic today for 3-month follow-up and interval imaging review.        Diagnoses and all orders for this visit:    1. Malignant neoplasm of upper lobe of right lung (Primary)  -     MRI Thoracic Spine With & Without Contrast; Future  -     CT Chest With Contrast Diagnostic; Future  -     CT Abdomen Pelvis With Contrast; Future       Plan:      Orders:  - MRI T spine for further work-up of compression fractures  - Refer to IR for evaluation/consideration of kyphoplasty  - CT CAP in 4 months  - Follow-up after imaging or sooner as clinically indicated    We reviewed recent imaging. We discussed there were no signs of disease progression. We reviewed the findings of a T2 and T4 compression fracture which appear to be causing pain. We discussed plan for MRI imaging for further work-up and potential referral to IR for consideration of kyphoplasty. We will repeat body imaging in 4 months. Patient encouraged to reach out with questions or concerns prior to next appointment.     I spent 30 minutes caring for Laura on this date of service. This time includes time spent by me in the following activities:preparing for the visit, reviewing tests, obtaining and/or reviewing  a separately obtained history, performing a medically appropriate examination and/or evaluation , counseling and educating the patient/family/caregiver, ordering medications, tests, or procedures, referring and communicating with other health care professionals , and independently interpreting results and communicating that information with the patient/family/caregiver      Follow-Up     No follow-ups on file.     CC: Laura Archer

## 2025-02-27 ENCOUNTER — OFFICE VISIT (OUTPATIENT)
Dept: RADIATION ONCOLOGY | Facility: HOSPITAL | Age: 65
End: 2025-02-27
Payer: MEDICARE

## 2025-02-27 VITALS
RESPIRATION RATE: 18 BRPM | DIASTOLIC BLOOD PRESSURE: 74 MMHG | SYSTOLIC BLOOD PRESSURE: 114 MMHG | BODY MASS INDEX: 24.49 KG/M2 | HEART RATE: 97 BPM | WEIGHT: 161 LBS | OXYGEN SATURATION: 97 %

## 2025-02-27 DIAGNOSIS — C34.11 MALIGNANT NEOPLASM OF UPPER LOBE OF RIGHT LUNG: Primary | ICD-10-CM

## 2025-02-27 PROCEDURE — G0463 HOSPITAL OUTPT CLINIC VISIT: HCPCS | Performed by: INTERNAL MEDICINE

## 2025-02-28 ENCOUNTER — HOSPITAL ENCOUNTER (OUTPATIENT)
Dept: MRI IMAGING | Facility: HOSPITAL | Age: 65
Discharge: HOME | End: 2025-02-28
Payer: MEDICARE

## 2025-02-28 DIAGNOSIS — C34.11 MALIGNANT NEOPLASM OF UPPER LOBE OF RIGHT LUNG: ICD-10-CM

## 2025-02-28 PROCEDURE — A9579 GAD-BASE MR CONTRAST NOS,1ML: HCPCS | Performed by: INTERNAL MEDICINE

## 2025-02-28 PROCEDURE — 72157 MRI CHEST SPINE W/O & W/DYE: CPT

## 2025-02-28 PROCEDURE — 25010000002 GADOTERIDOL PER 1 ML: Performed by: INTERNAL MEDICINE

## 2025-02-28 RX ADMIN — GADOTERIDOL 15 ML: 279.3 INJECTION, SOLUTION INTRAVENOUS at 14:07

## 2025-02-28 NOTE — PROGRESS NOTES
HEMATOLOGY ONCOLOGY OUTPATIENT FOLLOW UP       Patient name: Laura Higginbotham  : 1960  MRN: [id number]  Primary Care Physician: Laura Archer  Referring Physician: Laura Archer  Reason For Consult: right hilar adenocarcinoma of the lung    History of Present Illness:  Patient is a 64 y.o. PMH of smoking, skin cancer, arthritis, GERD who presents for newly diagnosed lung cancer.    -2024 Pt went to Dr Archer for increased nonproductive coughing and feeling more dysnpeic on exertion. Was dx with pleurisy and given prednisone which helped somewhat.  -2024 Since cough hadn't completely gone away in 6 weeks, patient went back to Dr. Archer who became concerned and ordered a CXR and antibiotics.  -CXR showed a right hilar mass, CT was ordered.    -2024 CT chest at [facility]: Revealed a very large right hilar mass measuring 9.2 x 9.5 cm in diameter with hypodensity centrally suggesting a necrotic center.  There is obstruction of the right upper lobe bronchus with the infiltrate seen extending peripheral to that area and marked elevation of the right hilum.  Mass extends into the right hilar lymph nodes.    Patient was seen in consultation by Dr. Crystal Powell, pulmonology. She denied having a cough, hemoptysis, wheezing, dyspnea, fever, unintentional weight loss.    -2020 for EBUS bronchoscopy by Dr. Crystal Powell with FNA to the right hilar node 10R and to the right hilar mass revealing inés revealing malignant cells favoring non-small cell.    Pathology showed in the right hilar mass IHC positive for CK7, TTF-1, and Napsin, negative for p63 and CK/6 consistent with pulmonary adenocarcinoma negative for CD56, chromogranin, synaptophysin excluding neuroendocrine component.  Per discussion with pathology: Passes from the right hilar mass were found to be acellular, viable tissue was from the lymph node biopsied and 1 cassette is available for future testing.  -Pneumocystis was negative,  respiratory  culture showing rare growth of normal respiratory alexi with no S. aureus or Pseudomonas aeruginosa detected.,  Respiratory panel PCR negative, PRELIM: no acid-fast bacilli seen on concentrated smear at 1 week, No isolated fungus at 1 week,.  -8/30/2024 CBC showed WBC 11.53, hemoglobin 13.1, hematocrit 40.7 with essentially normal indices, platelets 332K    -9/12/2024 Patient presented for initial consultation with her niece reporting constant nonproductive cough, she still denies having hemoptysis, wheezing, dyspnea, fever, or unintentional weight loss, new bony pains, no new H/A or focal neuro deficit except right under arm new tingling/burning.  She has a history of smoking 1 PPD- started at 17, this summer cut down to 1/2 PPD mostly because of the coughing (47+ years).    -9/20/2024 MRI brain with without contrast with no acute intracranial process identified, findings suggestive of minimal chronic small vessel ischemic disease but no evidence of intracranial metastasis    -9/23/2025 Caris shows PD-L1 positive with TPS 80% by PDL1 test 22C3, with level 1 evidence for cemiplimab and pembrolizumab, positive by PD-L1 28-8 with level 1 evidence for nivolumab/ipilimumab combination, positive by PD-L1  with TC 1+, 50% with atezolizumab in the metastatic setting level 1 evidence, and PD-L1 positive and  positive TC 1+60% with a benefit of atezolizumab in the adjuvant setting, and cemiplimab level 1 evidence.  -ALK negative/fusion not detected by RNA in the tumor, BRAF mutation not detected, EGFR mutation not detected, MET variant transcript not detected, RET fusion not detected, ROS1 fusion not detected.  Tumor mutation burden was high at 18, microsatellite stable.  KRAS pG12V found and 61%, TP53 oD622D found in 56%, NFKBIA was amplified    -9/25/2024 NM PET/CT skull initial staging: Heterogeneous right upper lobe lung mass extending into the superior right lower lobe, encasing the right mainstem bronchus,  encasing and narrowing the right upper lobe bronchus, is hypermetabolic (mSUV 8.76), consistent with malignancy. It has heterogeneous areas of photopenia, suggesting internal necrosis. Pre-carinal lymph node measuring 14 mm short axis is FDG avid (mSUV 4.0), consistent with malignancy.  No hybrid avidity in the neck, abdomen, pelvis, or skeleton. No left hilar adenopathy. No suspicious left lung nodules. Normal heart size with coronary calcifications. Benign calcified mediastinal and left hilar lymph nodes are present.    -10/2/2024 Patient presents in follow up with her niece reporting better nonproductive cough and rib pain since being oxycodone low dose, she still denies having hemoptysis, wheezing, dyspnea, fever, or unintentional weight loss, new bony pains, no new H/A or focal neuro deficit except right under arm new tingling/burning. Got SIMMED yesterday for radiation.  She has a history of smoking 1 PPD- started at 17, still on 1/2 PPD - gum stuck to dentures.    -10/3/2024 B12 1000 mcg given IM  -10/10 s/p Mediport placement    -10/14/2024 started chemoradiation (cisplatin +pemetrexed) reporting still with nonproductive cough and rib pain since being oxycodone low dose. Picked up her nausea medications, got her port,  her Emla cream (ports still a little tender), started her folate soon as she got it.  Did get her chemo teach and got her B12 shot as well she is ready to go.    -11/4/2024 Follow up for C2 chemoradiation reporting nonproductive cough and rib pain both better since being oxycodone low dose, using stool softener and yogurt which has greatly helped with stooling appropriately (EOD), patient had a lot of nausea the first 9 days, needing the zofran Q8H that time, a lot of fatigue.  -11/22/2024.  Patient completed radiation therapy to a total dose of 60 Gray (2 Gray per fraction with total of 30 fractions (under Dr. Ivan Dasilva).    -12/2/2024 Follow up for C3 chemotherapy, completed  radiation last week.  She is reporting more productive cough (mostly clear and thick occ some green); reports when she is drinking warm liquids or cold liquids he is still having pain when she tries to swallow the temperature seems to go down her throat where she chokes a little bit and then seems to radiate over to the upper right side of her chest this is relatively new.  Denies having any fevers, chills, pleuritic chest pain, shortness of breath in fact breathing seems to be easier.  She feels wiped out and rib pain better since being oxycodone low dose, using stool softener and yogurt which has greatly helped with stooling appropriately (EOD), patient still had a lot of nausea the first 9 days, even with addition of Zyprexa, needing the zofran Q8H that time, and admits that she is still taking the dexamethasone as prescribed day before, day of, day after and those are proximal to starting day of.    -Reviewed the report of the CT chest done at [facility] a few days ago shows some improvement of the size of her tumor and no concern of PNA or TE fistula.     -12/19/2024 Patient presents in follow up to ensure she will be ready for C4 chemotherapy on 12/31.  She is reporting the farther she gets from radiation, the more improvement she has in the productive cough (mostly clear and thick occ some green); reports also some improvement in the sensation of when she is drinking warm liquids or cold liquids not having as frequent or as severe pain when she tries to swallow the temperature seems to go down her throat, nor is she choking as much or radiating- over to the upper right side of her chest.  Denies having any fevers, chills, pleuritic chest pain, shortness of breath in fact breathing seems to be easier.  Her energy is a littler better, and rib pain better since completion of radiation;   -medicare finally filling the next oxycodone low dose Rx ordered by Dr Dasilva (also has Narcan)  -using stool softener and  yogurt which has greatly helped with stooling appropriately (EOD)  -patient still had a lot of nausea the first 9 days, even with addition of Zyprexa (needs refill for C4), needing the zofran Q8H that time,   -still taking the dexamethasone as prescribed day before, day of, day after and those are proximal to starting day of (needs refill for C4).    -1/16/2025 Got COVID H/A sore throat, body aches-all better except for back- in the middle, down towards the bottom (never ran fever) Her original productive cough-(mostly clear and thick occ some green); got worse with COVID- still worse, still not better. No shortness of breath. Because of this, has missed her 4th and last dose of chemo. Still not feeling well enough for in person visit today.    2/4/2025 1st imfinzi    Subjective:  -2/13/2025  NM bone scan:Increased uptake in the right third rib posteriorly corresponding to the area of tumor invasion locally in the right upper lobe. No evidence of distant metastatic disease.     -2/24/2025 CT C/A/P w/contrast: Visualized soft tissue of the lower neck demonstrate right-sided port catheter which is coiled within the internal jugular vein with a small amount of fibrin sheath or clot surrounding the catheter. The catheter tip terminates at the junction of the IJ vein and the right subclavian vein, stable prior. Precarinal lymph node decreased in size measuring 8 mm, previously 13 mm. No new mediastinal adenopathy. No pulmonary embolus. The thoracic aortic arch branch vasculature is patent. Within the RUL redemonstration of a mass extending to the right apex with size mildly decreased in the prior study with a representative measurement of 6.8 x 6.5 cm, previously 7.2 x 7.8 cm (4/45) with extension through the pleura to involve the right posterior central third rib as seen on the prior study with increased sclerosis at this site with no new rib involvement. Mass again extends to the right hilum with narrowing of the RUL  "bronchus and obstruction of the RUL bronchus posterior segment and extends across the major pulmonary fissure to involve the medial superior segment of the RLL. No new or enlarging nodule. Mild superior endplate fracture at T4 level unchanged. Mild superior endplate compression fracture at T2 new from the prior study. No retropulsed fracture fragment. Liver is mildly enlarged measuring 20 cm in craniocaudal length. No suspicious liver lesion. The spleen is normal in size. The adrenal glands are normal. Moderate amount of stool in the colon. Small fat-containing paraumbilical hernia. No inguinal adenopathy. No retroperitoneal or central mesenteric adenopathy. No aggressive osseous lesion or acute fracture.     IMPRESSION:  1. Right upper lobe mass extending to right hilum and involving the superior segment of the right lower lobe consistent with malignancy mildly decreased in size from prior study.  2. Mass again extends to involve posterior right third rib with erosive changes. No new chest wall involvement.  3. Decrease in size of pretracheal lymph node. No new intrathoracic adenopathy.  4. New mild superior endplate compression fracture at T2. Mild T4 compression fracture unchanged.  5. Right port catheter coiled in internal jugular vein with small amount of fibrin sheath or clot surrounding catheter similar to the prior study.  6. No findings of metastatic disease in the abdomen or pelvis.  7. Hepatomegaly and additional incidental findings above.    -2/28/2025 MRI T spine done- not read yet    -3/4/2025 Pt reports for 2nd imfinzi, her prior COVID s/s (H/A, sore throat, body aches)-still resolved   -Back pain is continued-still different pain from what she had on presentation and still ongoing- in the middle, can radiate down towards the bottom, denies having any popping sensations or crunching feeling. Asking for more pain medication \"Dr Dasilva wouldn't refill it because you did last time.\" Dr Dasilva had " scheduled an MRI of the back last week and referred for ?IR kyphoplasty +/- bone biopsy.  -Her original productive cough- (mostly clear and thick occ some green-unchanged); No shortness of breath unless it is a long cough.        Past Medical History:   Diagnosis Date    Arthritis     Cancer     skin cancer on leg right    GERD (gastroesophageal reflux disease)      Past Surgical History:   Procedure Laterality Date    BRONCHOSCOPY N/A 2024    Procedure: BRONCHOSCOPY WITH BRONCHIAL WASHING ,ENDOBRONCHIAL ULTRASOUND WITH FINE NEEDLE ASPIRATION X2 AREAS;  Surgeon: Crystal Powell MD;  Location: [facility] ENDOSCOPY;  Service: Pulmonary;  Laterality: N/A;     SECTION      x3    KNEE SURGERY Left     ORIF, screws and plates, has had Operation on it x 3       Current Outpatient Medications:     albuterol sulfate  (90 Base) MCG/ACT inhaler, Inhale 1 puff Every 4 (Four) Hours As Needed for Wheezing., Disp: , Rfl:     Breztri Aerosphere 160-9-4.8 MCG/ACT aerosol inhaler, Inhale 2 puffs 2 (Two) Times a Day., Disp: , Rfl:     famotidine (PEPCID) 20 MG tablet, Take 1 tablet by mouth 2 (Two) Times a Day., Disp: 30 tablet, Rfl: 2    gabapentin (NEURONTIN) 100 MG capsule, Take 1 capsule by mouth 2 (Two) Times a Day., Disp: , Rfl:     Glucose Blood (Blood Glucose Test Strips 333) strip, Check sugar twice a day and PRN and keep a log In Vitro, Disp: , Rfl:     lidocaine-prilocaine (EMLA) 2.5-2.5 % cream, Apply 1 Application topically to the appropriate area as directed Every 2 (Two) Hours As Needed for Mild Pain. To port site before chemo/blood draw, Disp: 30 g, Rfl: 2    metFORMIN (GLUCOPHAGE) 1000 MG tablet, Every 12 (Twelve) Hours., Disp: , Rfl:     naloxone (NARCAN) 4 MG/0.1ML nasal spray, Call 911. Don't prime. Tucker in 1 nostril for overdose. Repeat in 2-3 minutes in other nostril if no or minimal breathing/responsiveness., Disp: 2 each, Rfl: 0    ondansetron ODT (ZOFRAN-ODT) 8 MG disintegrating tablet, Take 1  tablet by mouth Every 8 (Eight) Hours As Needed for Nausea or Vomiting., Disp: 45 tablet, Rfl: 3    oxyCODONE (Roxicodone) 5 MG immediate release tablet, Take 1.5 tablets by mouth Every 6 (Six) Hours As Needed for Moderate Pain for up to 14 days., Disp: 65 tablet, Rfl: 0    oxyCODONE (ROXICODONE) 5 MG/5ML solution, Take 7.5 mL by mouth Every 4 (Four) Hours As Needed for Moderate Pain., Disp: 300 mL, Rfl: 0    prochlorperazine (COMPAZINE) 10 MG tablet, Take 1 tablet by mouth Every 6 (Six) Hours As Needed for Nausea or Vomiting., Disp: 60 tablet, Rfl: 1  No current facility-administered medications for this visit.    Facility-Administered Medications Ordered in Other Visits:     heparin injection 500 Units, 500 Units, Intravenous, PRN, Veronica Huynh MD PhD    heparin injection 500 Units, 500 Units, Intravenous, PRN, Veronica Huynh MD PhD, 500 Units at 03/04/25 1525    sodium chloride 0.9 % flush 10 mL, 10 mL, Intravenous, PRN, Veronica Huynh MD PhD, 10 mL at 03/04/25 1255    sodium chloride 0.9 % flush 10 mL, 10 mL, Intravenous, PRAmina HORTON Jennifer Leigh, MD PhD, 10 mL at 03/04/25 1525    No Known Allergies    Family History   Problem Relation Age of Onset    Lung cancer Brother         Lung cancer/Leukemia    Cancer Brother      Cancer-related family history includes Cancer in her brother; Lung cancer in her brother.    Social History     Tobacco Use    Smoking status: Every Day     Current packs/day: 0.50     Average packs/day: 0.5 packs/day for 48.2 years (24.1 ttl pk-yrs)     Types: Cigarettes     Start date: 1977    Smokeless tobacco: Never   Vaping Use    Vaping status: Never Used   Substance Use Topics    Alcohol use: Not Currently    Drug use: Never     Social History     Social History Narrative    Not on file      Review of Systems:  Constitutional: +fatigue (slightly improved), Denies any weakness, lack of appetite, excessive appetite, weight change, chills or fever.  Eyes:  Reports no blurry vision, no double vision, no pain in the eyes, dry eyes or tearing.  ENT: Reports no decreased hearing capacity, ear pain or tinnitus. Denies any epistaxis, nasal congestion, mouth sores or bleeding, tooth or jaw pain, sore throat or hoarseness.  Respiratory: + chronic cough (better with oxy)  Denies any sputum production or hemoptysis. There is no wheezing, shortness of breath or any other respiratory complaints.  Cardiovascular: + shortness of breath with activity (slightly improved unless long coughing fit), Denies any chest pain, shortness of breath when lying down, palpitations, or leg swelling.  Breasts: Denies any lumps, bumps, pain, nipple changes or discharge in the breasts.  Gastrointestinal: painful swallowing (resolved), nausea (resolved), vomiting (resolved), constipation (better per HPI) There are no complaints of abdominal pain, difficulty swallowing or anorexia, diarrhea,  heartburn, blood in the stools, dark stools, or change in bowel habits or hemorrhoids.  Genitourinary: Denies any pain or burning on urination, blood in the urine or frequent urination.   Musculoskeletal: +mid back pain (continued per HPI), Denies painful joints, no swelling of the joints, muscle pain. Denies any pain or tingling in the legs, hands or feet.  Neuropsychiatric: Denies any nervousness, depressed mood, headaches, blackouts, dizziness, weakness of limbs, loss of sensation, loss of balance, loss of coordination or difficulty in speaking.  Cutaneous: Denies any dry skin, itching, rash, change in the color of the skin, or any other cutaneous complaints.  Hematologic/Lymphatic: Denies any bruising or bleeding. Report no recent development of palpable or painful lymph nodes.  Allergy/Immunology: Denies rash/hayfever symptoms or any recent history of pneumonia, recurrent sinusitis, urinary infection or any other history of an ongoing infection.  Endocrine: Reports no intolerance to heat or cold, excessive  "thirst or excessive urination.    Objective:  Vital signs:  Vitals:    25 1237   BP: 112/74   Pulse: 93   Temp: 98 °F (36.7 °C)   TempSrc: Oral   SpO2: 99%   Weight: 73 kg (161 lb)   Height: 172.7 cm (67.99\")   PainSc: 4    PainLoc: Back  Comment: middle     Body mass index is 24.49 kg/m².      Physical Exam:   ECO  GENERAL: The patient is a pleasant middle aged white female who appears their stated age and is awake, in no acute distress, alert and oriented x3.  SKIN: No rash or ulcers.  HEME/LYMPHATICS: No bruising or petechiae on visual inspection. No lymphadenopathy on visual inspection and palpation of cervical, supraclavicular, infraclavicular lymph nodes.  HEAD/FACE: atraumatic and normocephalic. Normal hair.  EYES: PERRLA/EOMI. No scleral icterus. No tearing or dry eyes.  ENT: External ears normal with no evidence of discharge. No evidence of ulceration or bleeding in the nostrils. Mouth has no ulcers, bleeding or inflammation of the gums, floor or roof of the mouth with normal dentition.  NECK: Supple, symmetric.   CHEST/RESPIRATORY: Expansion maintained bilaterally and symmetrically. On auscultation, clear breath sounds in left lung, decreased breath sounds in upper right lung field (unchanged). No wheezes, rhonchi or rales.  BREAST: Deferred.  CARDIOVASCULAR: On auscultation, regular rate and rhythm. No murmurs, gallops or rubs are heard.  GASTROINTESTINAL/ABDOMEN: Symmetric. On auscultation of the abdomen, there are normal bowel sounds in all four quadrants. There are no surgical scars in the abdomen. Nontender to palpation.  GENITOURINARY: Deferred.  NEUROLOGIC: Alert and oriented time, person, and place, with no apparent changes in recent or remote memory. Cranial nerves II-XII are grossly intact. Sensation is maintained in the extremities. Strength and tone appear normal for age and equal in the extremities. Gait is normal.  MUSCULOSKELETAL: + TTP on midback for most of thoracic spine and " "paraspinous mm. No cyanosis, clubbing or edema in the extremities. There are no surgical scars on the extremities.  PSYCHIATRIC: The patient maintains judgment and has good insight. The patient has no changes in mood or affection.  IV: right chest Mediport accessed, NTTP, no erythema or edema.    Lab Results - Last 18 Months   Lab Units 03/04/25  1259 02/04/25  1240 12/02/24  0815   WBC 10*3/mm3 5.20 6.50 5.38   HEMOGLOBIN g/dL 11.6* 12.3 10.9*   HEMATOCRIT % 36.6 38.5 33.0*   PLATELETS 10*3/mm3 283 271 334   MCV fL 92.7 92.8 88.0     Lab Results - Last 18 Months   Lab Units 03/04/25  1259 02/04/25  1240 12/02/24  0815   SODIUM mmol/L 136 138 132*   POTASSIUM mmol/L 4.0 4.1 4.6   CHLORIDE mmol/L 100 102 97*   CO2 mmol/L 25.6 26.4 21.8*   BUN mg/dL 12 14 12   CREATININE mg/dL 0.82 0.84 0.78   CALCIUM mg/dL 10.0 10.0 9.8   BILIRUBIN mg/dL 0.2 <0.2 0.2   ALK PHOS U/L 87 91 106   ALT (SGPT) U/L 17 18 15   AST (SGOT) U/L 15 15 15   GLUCOSE mg/dL 129* 127* 406*       Lab Results   Component Value Date    GLUCOSE 129 (H) 03/04/2025    BUN 12 03/04/2025    CREATININE 0.82 03/04/2025    BCR 14.6 03/04/2025    K 4.0 03/04/2025    CO2 25.6 03/04/2025    CALCIUM 10.0 03/04/2025    ALBUMIN 3.9 03/04/2025    AST 15 03/04/2025    ALT 17 03/04/2025       Lab Results - Last 18 Months   Lab Units 10/10/24  0810 08/26/24  0945   INR  <0.93* 0.93   APTT seconds 30.8 27.1       No results found for: \"IRON\", \"TIBC\", \"FERRITIN\"    No results found for: \"FOLATE\"    No results found for: \"OCCULTBLD\"    No results found for: \"RETICCTPCT\"  No results found for: \"KFMHPUBL46\"  No results found for: \"SPEP\", \"UPEP\"  No results found for: \"LDH\", \"URICACID\"  No results found for: \"BEBETO\", \"RF\", \"SEDRATE\"  No results found for: \"FIBRINOGEN\", \"HAPTOGLOBIN\"  Lab Results   Component Value Date    PTT 30.8 10/10/2024    INR <0.93 (L) 10/10/2024     No results found for: \"\"  No results found for: \"CEA\"  No components found for: \"CA-19-9\"  No results " "found for: \"PSA\"  No results found for: \"SEDRATE\"    Assessment & Plan     65 y.o. female with PMH of smoking, skin cancer, arthritis, GERD who presented 8/2024  for newly diagnosed lung cancer, s/p definitive chemoradiation (completed 11/22/2024), currently on immunotherapy.    Right hilar mass/right upper lobe adenocarcinoma, at minimum T4 N2 (IIIB)     Previously discussed the above results which include imaging and pathology with the patient.  -Staging PET/CT no metastatic disease but confirmed IIIB disease, nonsurgical candidate-previously discussed this in detail with the patient and her family and explained that the standard of care at this point would be chemoradiation with curative intent.  Discussed the couple chemotherapy regimens that can be used for radiation.  -Staging brain MRI negative for metastasis    -Radiology Oncology on board: pt completed her concurrent chemoradiation -11/22/2024  -PFTs from pulmonologist done per patient- per patient 50-60%    -Biomarker testing +PD-L1 testing (category 1), +KRAS G12C, negative EGFR mutation including exon 19 del, L858R and other deletions, exon 20 insertions, (category 1), ALK fusions (category 1), ROS1 fusions, BRAF V600E, NTRK 1/2/3 fusions, MET exon 14 skipping or amplifications, RET fusions, ErbB2 (HER2) alterations,    -10/10/24 s/p Mediport placement with EMLA cream available  Didn't get cycle 4 chemotherapy on 12/31/2024 given COVID    -12/18/2024 [facility] CT chest scan already showing improvement in lung cancer size with no signs of progression near end of radiation completion showing improvement in lung cancer. Cannot do repeat imaging at this time given recent COVID infection (has been known to make lung nodules on imaging)  -1/16/25 Discussed durvalumab maintenance therapy every 2-4 weeks for one year per NCCN guidelines and FDA approval. We discussed that from the [facility] trial data, in stage III NSCLC nonresectable patients that receive " durvalumab post definitive chemoradiation vs. those that did not, have a 48% reduction in risk of progression. 5 year post hoc analysis showed median overall survival with durvalumab was 47.5 months vs. 29.1 months with placebo. HR 0.72. We previously discussed that durvalumab is a PD-L1 inhibitor and classified as immunotherapy. Patient was provided a [facility] information sheet on Durvalumab. We discussed side effects including but not limited to immune mediated effects such as pneumonitis, colitis and hepatitis as well as nausea, diarrhea, fatigue, electrolyte abnormalities and infusion reactions.    PLAN: cisplatin plus pemetrexed Q21 days X 3 cycles with concurrent radiation with repeat imaging showing improvement no progression (completed), followed by durvalumab 1500 mg Q4 weeks x 12 cycles (category 1 for stage III)     -2/24/2024 CT C/A/P w/contrast: Right-sided port catheter coiled within the IJ vein with a small amount of fibrin sheath or clot surrounding the catheter. The catheter tip terminates at the junction of the IJ vein and the right subclavian vein, stable prior. Precarinal LN decreased in size measuring 8 mm, previously 13 mm. No new mediastinal adenopathy. RUL redemonstration of a mass extending to the right apex with size mildly decreased in the prior study with a representative measurement of 6.8 x 6.5 cm, previously 7.2 x 7.8 cm with extension through the pleura to involve the right posterior central third rib as seen on the prior study with increased sclerosis at this site with no new rib involvement. Mass again extends to the right hilum with narrowing of the RUL bronchus and obstruction of the RUL bronchus posterior segment and extends across the major pulmonary fissure to involve the medial superior segment of the RLL. No new or enlarging nodule. Mild superior endplate fracture at T4 level unchanged. Mild superior endplate compression fracture at T2 new from the prior study. No  "retropulsed fracture fragment. Liver is mildly enlarged measuring 20 cm in craniocaudal length. No aggressive osseous lesion or acute fracture.    -precert approved 1/17/25, given labs within acceptable parameters and patient H&P within acceptable parameters, proceed with C2 today and RTC for RT2 in 4 weeks with labs for C3D1  -*Data has shown a Palliative consult to help manage symptoms early on in care for advanced stage lung cancer patients.    2. Worsening mid-level back pain, sub-acute  -2/4/2025 patient reporting this is not the same kind of pain she had on presentation (with the local invasion of the lung cancer into local tissues), this is a new or, getting more consistent aching pain \"I almost feel like I need to stretch or pull\" and she went through all of the medication Dr. Dasilva had given her.  -Was previously getting Светлана 7.5 liquid which she was using 1-2 times a day, occasionally up to 3 times a day.    -3/4/2025: Re-ordered Светлана 7.5 mg (1.5 tablets) every 8 hours as needed for pain, 14-day supply.  -Patient already knows about bowel regimens do not get constipated.  -2/13/2025 NM bone scan: Increased uptake in the right third rib posteriorly corresponding to the area of tumor invasion locally in the right upper lobe. No evidence of distant metastatic disease.   -2/24/25 CT C/A/P as in #1: New mild superior endplate compression fracture at T2; MRI done 2/28 and still pending read. Dr Dasilva had ordered MRI of the back last week (READ PENDING TODAY); and referred for ?IR kyphoplasty +/- bone biopsy per patient.    3. Tobacco dependence  -tried nicotine gum but stuck to dentures, is 1/2 ppd (but only 1/2 a cigarette when she smokes)    4.  Hyperglycemia, noted after start of treatment to 300s  -Patient started on 1000 mg metformin daily by PCP, BS still over 300 today, patient had eaten.  -Continue to monitor; patient has had metformin changed around a bit, says she has follow-up with PCP " coming      Thank you very much for providing the opportunity to participate in this patient’s care. Please do not hesitate to call if there are any other questions.

## 2025-03-04 ENCOUNTER — HOSPITAL ENCOUNTER (OUTPATIENT)
Dept: ONCOLOGY | Facility: HOSPITAL | Age: 65
Discharge: HOME | End: 2025-03-04
Payer: MEDICARE

## 2025-03-04 ENCOUNTER — OFFICE VISIT (OUTPATIENT)
Dept: ONCOLOGY | Facility: CLINIC | Age: 65
End: 2025-03-04
Payer: MEDICARE

## 2025-03-04 VITALS
TEMPERATURE: 98 F | HEART RATE: 93 BPM | WEIGHT: 161 LBS | DIASTOLIC BLOOD PRESSURE: 74 MMHG | OXYGEN SATURATION: 99 % | HEIGHT: 68 IN | SYSTOLIC BLOOD PRESSURE: 112 MMHG | BODY MASS INDEX: 24.4 KG/M2

## 2025-03-04 DIAGNOSIS — C77.1 NON-SMALL CELL LUNG CANCER METASTATIC TO INTRATHORACIC LYMPH NODE: ICD-10-CM

## 2025-03-04 DIAGNOSIS — C34.11 MALIGNANT NEOPLASM OF UPPER LOBE OF RIGHT LUNG: Primary | ICD-10-CM

## 2025-03-04 DIAGNOSIS — M54.6 ACUTE BILATERAL THORACIC BACK PAIN: ICD-10-CM

## 2025-03-04 DIAGNOSIS — C34.90 NON-SMALL CELL LUNG CANCER METASTATIC TO INTRATHORACIC LYMPH NODE: ICD-10-CM

## 2025-03-04 DIAGNOSIS — Z51.81 ENCOUNTER FOR THERAPEUTIC DRUG MONITORING: ICD-10-CM

## 2025-03-04 DIAGNOSIS — Z29.89 ENCOUNTER FOR IMMUNOTHERAPY: ICD-10-CM

## 2025-03-04 LAB
ALBUMIN SERPL-MCNC: 3.9 G/DL (ref 3.5–5.2)
ALBUMIN/GLOB SERPL: 1.2 G/DL
ALP SERPL-CCNC: 87 U/L (ref 39–117)
ALT SERPL W P-5'-P-CCNC: 17 U/L (ref 1–33)
ANION GAP SERPL CALCULATED.3IONS-SCNC: 10.4 MMOL/L (ref 5–15)
AST SERPL-CCNC: 15 U/L (ref 1–32)
BASOPHILS # BLD AUTO: 0.02 10*3/MM3 (ref 0–0.2)
BASOPHILS NFR BLD AUTO: 0.4 % (ref 0–1.5)
BILIRUB SERPL-MCNC: 0.2 MG/DL (ref 0–1.2)
BUN SERPL-MCNC: 12 MG/DL (ref 8–23)
BUN/CREAT SERPL: 14.6 (ref 7–25)
CALCIUM SPEC-SCNC: 10 MG/DL (ref 8.6–10.5)
CHLORIDE SERPL-SCNC: 100 MMOL/L (ref 98–107)
CO2 SERPL-SCNC: 25.6 MMOL/L (ref 22–29)
CREAT SERPL-MCNC: 0.82 MG/DL (ref 0.57–1)
DEPRECATED RDW RBC AUTO: 41.8 FL (ref 37–54)
EGFRCR SERPLBLD CKD-EPI 2021: 79.5 ML/MIN/1.73
EOSINOPHIL # BLD AUTO: 0.03 10*3/MM3 (ref 0–0.4)
EOSINOPHIL NFR BLD AUTO: 0.6 % (ref 0.3–6.2)
ERYTHROCYTE [DISTWIDTH] IN BLOOD BY AUTOMATED COUNT: 12.8 % (ref 12.3–15.4)
GLOBULIN UR ELPH-MCNC: 3.2 GM/DL
GLUCOSE SERPL-MCNC: 129 MG/DL (ref 65–99)
HCT VFR BLD AUTO: 36.6 % (ref 34–46.6)
HGB BLD-MCNC: 11.6 G/DL (ref 12–15.9)
LYMPHOCYTES # BLD AUTO: 0.87 10*3/MM3 (ref 0.7–3.1)
LYMPHOCYTES NFR BLD AUTO: 16.7 % (ref 19.6–45.3)
MCH RBC QN AUTO: 29.4 PG (ref 26.6–33)
MCHC RBC AUTO-ENTMCNC: 31.7 G/DL (ref 31.5–35.7)
MCV RBC AUTO: 92.7 FL (ref 79–97)
MONOCYTES # BLD AUTO: 0.47 10*3/MM3 (ref 0.1–0.9)
MONOCYTES NFR BLD AUTO: 9 % (ref 5–12)
NEUTROPHILS NFR BLD AUTO: 3.81 10*3/MM3 (ref 1.7–7)
NEUTROPHILS NFR BLD AUTO: 73.3 % (ref 42.7–76)
PLATELET # BLD AUTO: 283 10*3/MM3 (ref 140–450)
PMV BLD AUTO: 8.3 FL (ref 6–12)
POTASSIUM SERPL-SCNC: 4 MMOL/L (ref 3.5–5.2)
PROT SERPL-MCNC: 7.1 G/DL (ref 6–8.5)
RBC # BLD AUTO: 3.95 10*6/MM3 (ref 3.77–5.28)
SODIUM SERPL-SCNC: 136 MMOL/L (ref 136–145)
T4 FREE SERPL-MCNC: 1.24 NG/DL (ref 0.93–1.7)
TSH SERPL DL<=0.05 MIU/L-ACNC: 2.71 UIU/ML (ref 0.27–4.2)
WBC NRBC COR # BLD AUTO: 5.2 10*3/MM3 (ref 3.4–10.8)

## 2025-03-04 PROCEDURE — 99214 OFFICE O/P EST MOD 30 MIN: CPT | Performed by: INTERNAL MEDICINE

## 2025-03-04 PROCEDURE — 25010000002 HEPARIN LOCK FLUSH PER 10 UNITS: Performed by: INTERNAL MEDICINE

## 2025-03-04 PROCEDURE — 84439 ASSAY OF FREE THYROXINE: CPT | Performed by: INTERNAL MEDICINE

## 2025-03-04 PROCEDURE — 84443 ASSAY THYROID STIM HORMONE: CPT | Performed by: INTERNAL MEDICINE

## 2025-03-04 PROCEDURE — 1125F AMNT PAIN NOTED PAIN PRSNT: CPT | Performed by: INTERNAL MEDICINE

## 2025-03-04 PROCEDURE — 25810000003 SODIUM CHLORIDE 0.9 % SOLUTION 250 ML FLEX CONT: Performed by: INTERNAL MEDICINE

## 2025-03-04 PROCEDURE — 96413 CHEMO IV INFUSION 1 HR: CPT

## 2025-03-04 PROCEDURE — 80053 COMPREHEN METABOLIC PANEL: CPT | Performed by: INTERNAL MEDICINE

## 2025-03-04 PROCEDURE — 85025 COMPLETE CBC W/AUTO DIFF WBC: CPT | Performed by: INTERNAL MEDICINE

## 2025-03-04 PROCEDURE — 25010000002 DURVALUMAB 50 MG/ML SOLUTION 10 ML VIAL: Performed by: INTERNAL MEDICINE

## 2025-03-04 RX ORDER — HEPARIN SODIUM (PORCINE) LOCK FLUSH IV SOLN 100 UNIT/ML 100 UNIT/ML
500 SOLUTION INTRAVENOUS AS NEEDED
Status: DISCONTINUED | OUTPATIENT
Start: 2025-03-04 | End: 2025-03-05 | Stop reason: HOSPADM

## 2025-03-04 RX ORDER — HEPARIN SODIUM (PORCINE) LOCK FLUSH IV SOLN 100 UNIT/ML 100 UNIT/ML
500 SOLUTION INTRAVENOUS AS NEEDED
OUTPATIENT
Start: 2025-03-04

## 2025-03-04 RX ORDER — SODIUM CHLORIDE 0.9 % (FLUSH) 0.9 %
10 SYRINGE (ML) INJECTION AS NEEDED
Status: DISCONTINUED | OUTPATIENT
Start: 2025-03-04 | End: 2025-03-05 | Stop reason: HOSPADM

## 2025-03-04 RX ORDER — SODIUM CHLORIDE 0.9 % (FLUSH) 0.9 %
10 SYRINGE (ML) INJECTION AS NEEDED
Status: CANCELLED | OUTPATIENT
Start: 2025-03-04

## 2025-03-04 RX ORDER — OXYCODONE HYDROCHLORIDE 5 MG/1
7.5 TABLET ORAL EVERY 6 HOURS PRN
Qty: 65 TABLET | Refills: 0 | Status: SHIPPED | OUTPATIENT
Start: 2025-03-04 | End: 2025-03-18

## 2025-03-04 RX ORDER — SODIUM CHLORIDE 9 MG/ML
20 INJECTION, SOLUTION INTRAVENOUS ONCE
Status: CANCELLED | OUTPATIENT
Start: 2025-03-04

## 2025-03-04 RX ORDER — HEPARIN SODIUM (PORCINE) LOCK FLUSH IV SOLN 100 UNIT/ML 100 UNIT/ML
500 SOLUTION INTRAVENOUS AS NEEDED
Status: CANCELLED | OUTPATIENT
Start: 2025-03-04

## 2025-03-04 RX ORDER — SODIUM CHLORIDE 0.9 % (FLUSH) 0.9 %
10 SYRINGE (ML) INJECTION AS NEEDED
OUTPATIENT
Start: 2025-03-04

## 2025-03-04 RX ADMIN — HEPARIN 500 UNITS: 100 SYRINGE at 15:25

## 2025-03-04 RX ADMIN — Medication 10 ML: at 12:55

## 2025-03-04 RX ADMIN — Medication 10 ML: at 15:25

## 2025-03-04 RX ADMIN — SODIUM CHLORIDE 1500 MG: 9 INJECTION, SOLUTION INTRAVENOUS at 14:18

## 2025-03-04 NOTE — PROGRESS NOTES
Patient to infusion for C2 durvalumab after seeing Dr Huynh today in clinic. VSS, patient has met parameters. Tolerated infusion well, printed AVS.

## 2025-03-04 NOTE — PROGRESS NOTES
Pt here for labs, a f/u visit with a provider, and infusion. Port accessed and flushed with good blood return noted. 10cc of blood wasted prior to specimen collection. Blood specimen obtained and sent to lab for processing per protocol. Port flushed with NS, saline locked, and curos caps placed. Pt sent back to waiting room and [facility] [place] staff notified.

## 2025-03-04 NOTE — PATIENT INSTRUCTIONS
Plan for txt today pending your CMP    Agree with seeing IR for possible kyphoplasty +/- biopsy of the sight    Светлана sent for refill  Take the 100 mg gabapentins as discussed, slowly increasing PM dose first, then AM dose; call Dr Huynh's office next week with update    RTC in 4 weeks with labs and MD visit prior to next txt

## 2025-03-05 ENCOUNTER — PATIENT OUTREACH (OUTPATIENT)
Dept: ONCOLOGY | Facility: CLINIC | Age: 65
End: 2025-03-05
Payer: MEDICARE

## 2025-03-06 DIAGNOSIS — C34.90 NON-SMALL CELL LUNG CANCER METASTATIC TO INTRATHORACIC LYMPH NODE: Primary | ICD-10-CM

## 2025-03-06 DIAGNOSIS — C77.1 NON-SMALL CELL LUNG CANCER METASTATIC TO INTRATHORACIC LYMPH NODE: Primary | ICD-10-CM

## 2025-03-10 ENCOUNTER — HOSPITAL ENCOUNTER (OUTPATIENT)
Dept: PET IMAGING | Facility: HOSPITAL | Age: 65
Discharge: HOME | End: 2025-03-10
Payer: MEDICARE

## 2025-03-10 DIAGNOSIS — C34.90 NON-SMALL CELL LUNG CANCER METASTATIC TO INTRATHORACIC LYMPH NODE: ICD-10-CM

## 2025-03-10 DIAGNOSIS — C77.1 NON-SMALL CELL LUNG CANCER METASTATIC TO INTRATHORACIC LYMPH NODE: ICD-10-CM

## 2025-03-10 LAB — GLUCOSE BLDC GLUCOMTR-MCNC: 132 MG/DL (ref 70–105)

## 2025-03-10 PROCEDURE — 78815 PET IMAGE W/CT SKULL-THIGH: CPT

## 2025-03-10 PROCEDURE — 82948 REAGENT STRIP/BLOOD GLUCOSE: CPT

## 2025-03-10 PROCEDURE — 34310000005 FLUDEOXYGLUCOSE F18 SOLUTION: Performed by: INTERNAL MEDICINE

## 2025-03-10 PROCEDURE — A9552 F18 FDG: HCPCS | Performed by: INTERNAL MEDICINE

## 2025-03-10 RX ADMIN — FLUDEOXYGLUCOSE F 18 1 DOSE: 200 INJECTION, SOLUTION INTRAVENOUS at 12:32

## 2025-03-13 ENCOUNTER — OFFICE VISIT (OUTPATIENT)
Dept: RADIATION ONCOLOGY | Facility: HOSPITAL | Age: 65
End: 2025-03-13
Payer: MEDICARE

## 2025-03-13 VITALS
OXYGEN SATURATION: 97 % | RESPIRATION RATE: 18 BRPM | BODY MASS INDEX: 24.03 KG/M2 | DIASTOLIC BLOOD PRESSURE: 65 MMHG | HEART RATE: 106 BPM | WEIGHT: 158 LBS | SYSTOLIC BLOOD PRESSURE: 125 MMHG

## 2025-03-13 DIAGNOSIS — C34.90 NON-SMALL CELL LUNG CANCER METASTATIC TO INTRATHORACIC LYMPH NODE: ICD-10-CM

## 2025-03-13 DIAGNOSIS — C34.11 MALIGNANT NEOPLASM OF UPPER LOBE OF RIGHT LUNG: Primary | ICD-10-CM

## 2025-03-13 DIAGNOSIS — S22.020A COMPRESSION FRACTURE OF T2 VERTEBRA, INITIAL ENCOUNTER: ICD-10-CM

## 2025-03-13 DIAGNOSIS — C77.1 NON-SMALL CELL LUNG CANCER METASTATIC TO INTRATHORACIC LYMPH NODE: ICD-10-CM

## 2025-03-13 PROCEDURE — G0463 HOSPITAL OUTPT CLINIC VISIT: HCPCS | Performed by: INTERNAL MEDICINE

## 2025-03-13 NOTE — PROGRESS NOTES
[facility] RADIATION ONCOLOGY  FOLLOW-UP    Name: Laura Higginbotham  YOB: 1960  MRN #: [id number]  Date of Service: 3/13/2025    Chief Complaint:  PET imaging follow-up    Diagnosis:   Encounter Diagnoses   Name Primary?    Malignant neoplasm of upper lobe of right lung Yes    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         Radiation Treatment Course     Treating Physician: Dr. Ivan Dasilva     Start: 10/14/2024     End: 11/22/2024   Site: Right upper lobe    Total Dose: 6000 cGy    Dose/Fraction: 200 cGy    Total Fractions: 30 daily Daily or BID: Daily  Modality: Photon  Technique: IMRT/VMAT/Rapid Arc  Bolus: No       Interval History     Laura Higginbotham is a 65 y.o. female who was diagnosed with stage IIIb (cT4 cN2 cM0) right upper lobe lung adenocarcinoma in August 2024.  She completed definitive chemoradiation with 3 cycles cisplatin/pemetrexed and 6000 cGy in 30 fractions on 12/2/2024.  Immunotherapy with durvalumab was initiated on 2/4/2025.  She developed worsening mid back pain in February 2025.  CT CAP showed a new mild superior endplate compression fracture at T2 and unchanged mild T4 compression fracture.  Additional imaging was ordered for further evaluation.  She returns to our clinic today to review recent MRI and PET/CT imaging and for treatment planning.    2/28/2025: MRI thoracic spine showed subacute appearing compression deformity of the superior endplate of T2 with chronic mild compression deformity of the superior endplate of T4, no indication of bony repulsion, subluxation, or canal compromise, findings suspicious for metastatic disease involving T2, T3, T5, and questionably T7 vertebrae, malignancy involving the right third rib, right upper lobe lung mass with contiguous extension from right hilum and pleural malignant involvement.    3/10/2025: PET/CT report was pending at time of her appointment. Her PET did not appear to show any PET avid  malignancy at the site of her MRI noted disease.     Patient reports continued pain in her back. Her pain medicine is being managed by Dr. Huynh in Med Onc. She is here to review recent imaging.       Imaging     PET/CT Scan  ADDENDUM. Report Available after patient visit  Findings:  Head and neck: No abnormal FDG activity identified.     Chest: Increased uptake within the right lobe of the thyroid may represent a thyroid nodule, though no nodule is identified on CT scan from today or prior. The SUV max is 6. Redemonstration of right upper lobe mass with cavitation. There is significant   decrease in size and FDG activity compared to the prior exam. The right upper lobe component has an SUV maximum of 12.5. The mass is difficult to measure precisely, but measures approximately 3.8 cm in diameter, decreased compared to prior exam of 4.6   cm. There is rim of mild FDG activity surrounding the cavitary portion, which is also decreased in size and activity compared to prior. There remains mild, persistent erosion into the right posterior third rib with mild residual FDG activity and SUV   maximum of 3.7. No extension into the chest wall. No new pulmonary nodules are identified. The pretracheal lymph node remains nonenlarged without FDG activity.     Abdomen and pelvis: No abnormal FDG activity identified.     Musculoskeletal: No abnormal FDG activity is identified to correspond to lesion seen on recent MRI. In addition there is no abnormal CT appearance to correspond to the lesions on MRI. No osseous lesions in the pelvis are identified. Mild compression   deformities of T2 and T4 superior endplates.        IMPRESSION:  Impression:     1. Interval treatment response with continued decrease in size and activity of right upper lobe mass with cavitation, as well as extension into the right posterior third rib  2. The lesion seen on recent MRI do not have a CT, or PET correlate, but do remain suspicious for metastatic  disease  3. Asymmetric focal uptake within the right lobe of the thyroid, without correlate on CT. Finding is nonspecific, consider correlation with thyroid ultrasound to exclude nodule.       MRI Thoracic Spine With & Without Contrast  Result Date: 3/5/2025  1. Subacute appearing compression deformity of the superior endplate of T2. Chronic mild compression deformity of the superior plate of T4. No indication of bony retropulsion, subluxation, or canal compromise. 2. Findings suspicious for metastatic disease involving T2, T3, T5, and questionably T7 vertebrae. Malignant involvement of the right third rib. 3. Right upper lobe lung mass with contiguous extension to the right hilum and pleural malignant involvement. Electronically Signed: Megan Mcgill MD  3/5/2025 8:34 AM EST  Workstation ID: [id number]    CT Chest With Contrast Diagnostic  Result Date: 2/26/2025  Impression: Chest: 1. Right upper lobe mass extending to right hilum and involving the superior segment of the right lower lobe consistent with malignancy mildly decreased in size from prior study. 2. Mass again extends to involve posterior right third rib with erosive changes. No new chest wall involvement. 3. Decrease in size of pretracheal lymph node. No new intrathoracic adenopathy. 4. New mild superior endplate compression fracture at T2. Mild T4 compression fracture unchanged. 5. Right port catheter coiled in internal jugular vein with small amount of fibrin sheath or clot surrounding catheter similar to the prior study. 6. Additional incidental findings above. Abdomen and pelvis: 1. No findings of metastatic disease in the abdomen or pelvis. 2. Hepatomegaly and additional incidental findings above. Electronically Signed: Jostin Albert MD  2/26/2025 8:59 AM EST  Workstation ID: [id number]    CT Abdomen Pelvis With Contrast  Result Date: 2/26/2025  Impression: Chest: 1. Right upper lobe mass extending to right hilum and involving the superior segment of  the right lower lobe consistent with malignancy mildly decreased in size from prior study. 2. Mass again extends to involve posterior right third rib with erosive changes. No new chest wall involvement. 3. Decrease in size of pretracheal lymph node. No new intrathoracic adenopathy. 4. New mild superior endplate compression fracture at T2. Mild T4 compression fracture unchanged. 5. Right port catheter coiled in internal jugular vein with small amount of fibrin sheath or clot surrounding catheter similar to the prior study. 6. Additional incidental findings above. Abdomen and pelvis: 1. No findings of metastatic disease in the abdomen or pelvis. 2. Hepatomegaly and additional incidental findings above. Electronically Signed: Jostin Albert MD  2/26/2025 8:59 AM EST  Workstation ID: [id number]      Pathology     No new pathology to review.      Labs     Hematology:  WBC   Date Value Ref Range Status   03/04/2025 5.20 3.40 - 10.80 10*3/mm3 Final     RBC   Date Value Ref Range Status   03/04/2025 3.95 3.77 - 5.28 10*6/mm3 Final     Hemoglobin   Date Value Ref Range Status   03/04/2025 11.6 (L) 12.0 - 15.9 g/dL Final     Hematocrit   Date Value Ref Range Status   03/04/2025 36.6 34.0 - 46.6 % Final     Platelets   Date Value Ref Range Status   03/04/2025 283 140 - 450 10*3/mm3 Final     Chemistry:  Lab Results   Component Value Date    GLUCOSE 129 (H) 03/04/2025    BUN 12 03/04/2025    CREATININE 0.82 03/04/2025    BCR 14.6 03/04/2025    K 4.0 03/04/2025    CO2 25.6 03/04/2025    CALCIUM 10.0 03/04/2025    ALBUMIN 3.9 03/04/2025    AST 15 03/04/2025    ALT 17 03/04/2025       Problem List     Patient Active Problem List   Diagnosis    Malignant neoplasm of upper lobe of right lung    Moderate major depression, single episode    Organic anxiety disorder    Polyneuropathy    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    Chemotherapy-induced nausea    Choking    Encounter for therapeutic drug monitoring     Encounter for immunotherapy    Encounter for long-term (current) use of high-risk medication    Acute bilateral thoracic back pain        Current Medications     Current Outpatient Medications   Medication Instructions    albuterol sulfate  (90 Base) MCG/ACT inhaler 1 puff, Every 4 Hours PRN    Breztri Aerosphere 160-9-4.8 MCG/ACT aerosol inhaler 2 puffs, 2 Times Daily    famotidine (PEPCID) 20 mg, Oral, 2 Times Daily    gabapentin (NEURONTIN) 100 mg, 2 Times Daily - RT    Glucose Blood (Blood Glucose Test Strips 333) strip Check sugar twice a day and PRN and keep a log In Vitro    lidocaine-prilocaine (EMLA) 2.5-2.5 % cream 1 Application, Topical, Every 2 Hours PRN, To port site before chemo/blood draw    metFORMIN (GLUCOPHAGE) 1000 MG tablet Every 12 Hours Scheduled    naloxone (NARCAN) 4 MG/0.1ML nasal spray Call 911. Don't prime. [place] in 1 nostril for overdose. Repeat in 2-3 minutes in other nostril if no or minimal breathing/responsiveness.    ondansetron ODT (ZOFRAN-ODT) 8 mg, Oral, Every 8 Hours PRN    oxyCODONE (ROXICODONE) 7.5 mg, Oral, Every 4 Hours PRN    oxyCODONE (ROXICODONE) 7.5 mg, Oral, Every 6 Hours PRN    prochlorperazine (COMPAZINE) 10 mg, Oral, Every 6 Hours PRN        Allergies     No Known Allergies      Review of Systems     Review of Systems   Constitutional:  Positive for activity change and fatigue.   Musculoskeletal:  Positive for back pain.   Psychiatric/Behavioral:  The patient is nervous/anxious.       There were no vitals filed for this visit.   Physical Exam  Constitutional:       General: She is not in acute distress.  HENT:      Head: Normocephalic and atraumatic.   Pulmonary:      Effort: Pulmonary effort is normal. No respiratory distress.   Musculoskeletal:      Comments: Pain in central upper back    Neurological:      General: No focal deficit present.      Mental Status: She is alert and oriented to person, place, and time. Mental status is at baseline.   Psychiatric:          Mood and Affect: Mood normal.         Behavior: Behavior normal.          ECOG:  Restricted in physically strenuous activity but ambulatory and able to carry out work of a light or sedentary nature, e.g., light house work, office work = 1        Assessment and Plan     Assessment:      Laura Higginbotham is a 65 y.o. female who was diagnosed with stage IIIb (cT4 cN2 cM0) right upper lobe lung adenocarcinoma in August 2024.  She completed definitive chemoradiation with 3 cycles cisplatin/pemetrexed and 6000 cGy in 30 fractions on 12/2/2024.  Immunotherapy with durvalumab was initiated on 2/4/2025.  She developed worsening mid back pain in February 2025.  CT CAP showed a new mild superior endplate compression fracture at T2 and unchanged mild T4 compression fracture.  Additional imaging was ordered for further evaluation.  She returns to our clinic today to review recent MRI and PET/CT imaging and for treatment planning.      Diagnoses and all orders for this visit:    1. Malignant neoplasm of upper lobe of right lung (Primary)    2. 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       Plan:      Orders:  - Wait for PET results and discuss case with Dr. Huynh and Dr. Jefferson in Radiology    ADDENDUM  -I reviewed the case with Dr. Jefferson and radiology.  We discussed the discordant findings between the MRI and the PET scan.  Given the discordance, Dr. Jefferson recommended biopsy to provide a definitive diagnosis.  I will notify the patient of the recommendation.  The patient can also be considered for kyphoplasty for her back pain which can be further discussed with Dr. Jefferson.    I spent 30 minutes caring for Laura on this date of service. This time includes time spent by me in the following activities:preparing for the visit, reviewing tests, obtaining and/or reviewing a separately obtained history, performing a medically appropriate examination and/or evaluation , counseling and educating the  patient/family/caregiver, ordering medications, tests, or procedures, referring and communicating with other health care professionals , documenting information in the medical record, and independently interpreting results and communicating that information with the patient/family/caregiver      Follow-Up     No follow-ups on file.     CC: Laura Archer

## 2025-03-15 DIAGNOSIS — M89.9 LESION OF BONE OF THORACIC SPINE: Primary | ICD-10-CM

## 2025-03-27 PROBLEM — C34.11 PRIMARY ADENOCARCINOMA OF UPPER LOBE OF RIGHT LUNG: Status: ACTIVE | Noted: 2025-03-27

## 2025-03-27 NOTE — PROGRESS NOTES
HEMATOLOGY ONCOLOGY OUTPATIENT FOLLOW UP       Patient name: Laura Higginbotham  : 1960  MRN: [id number]  Primary Care Physician: Laura Archer  Referring Physician: Laura Archer  Reason For Consult: right hilar adenocarcinoma of the lung    History of Present Illness:  Patient is a 64 y.o. PMH of smoking, skin cancer, arthritis, GERD who presents for newly diagnosed lung cancer.    -2024 Pt went to Dr Archer for increased nonproductive coughing and feeling more dysnpeic on exertion. Was dx with pleurisy and given prednisone which helped somewhat.  -2024 Since cough hadn't completely gone away in 6 weeks, patient went back to Dr. Archer who became concerned and ordered a CXR and antibiotics.  -CXR showed a right hilar mass, CT was ordered.    -2024 CT chest at [facility]: Revealed a very large right hilar mass measuring 9.2 x 9.5 cm in diameter with hypodensity centrally suggesting a necrotic center.  There is obstruction of the right upper lobe bronchus with the infiltrate seen extending peripheral to that area and marked elevation of the right hilum.  Mass extends into the right hilar lymph nodes.    Patient was seen in consultation by Dr. Crystal Powell, pulmonology. She denied having a cough, hemoptysis, wheezing, dyspnea, fever, unintentional weight loss.    -2020 for EBUS bronchoscopy by Dr. Crystal Powell with FNA to the right hilar node 10R and to the right hilar mass revealing inés revealing malignant cells favoring non-small cell.    Pathology showed in the right hilar mass IHC positive for CK7, TTF-1, and Napsin, negative for p63 and CK/6 consistent with pulmonary adenocarcinoma negative for CD56, chromogranin, synaptophysin excluding neuroendocrine component.  Per discussion with pathology: Passes from the right hilar mass were found to be acellular, viable tissue was from the lymph node biopsied and 1 cassette is available for future testing.  -Pneumocystis was negative,  respiratory  culture showing rare growth of normal respiratory alexi with no S. aureus or Pseudomonas aeruginosa detected.,  Respiratory panel PCR negative, PRELIM: no acid-fast bacilli seen on concentrated smear at 1 week, No isolated fungus at 1 week,.  -8/30/2024 CBC showed WBC 11.53, hemoglobin 13.1, hematocrit 40.7 with essentially normal indices, platelets 332K    -9/12/2024 Patient presented for initial consultation with her niece reporting constant nonproductive cough, she still denies having hemoptysis, wheezing, dyspnea, fever, or unintentional weight loss, new bony pains, no new H/A or focal neuro deficit except right under arm new tingling/burning.  She has a history of smoking 1 PPD- started at 17, this summer cut down to 1/2 PPD mostly because of the coughing (47+ years).    -9/20/2024 MRI brain with without contrast with no acute intracranial process identified, findings suggestive of minimal chronic small vessel ischemic disease but no evidence of intracranial metastasis    -9/23/2025 Caris shows PD-L1 positive with TPS 80% by PDL1 test 22C3, with level 1 evidence for cemiplimab and pembrolizumab, positive by PD-L1 28-8 with level 1 evidence for nivolumab/ipilimumab combination, positive by PD-L1  with TC 1+, 50% with atezolizumab in the metastatic setting level 1 evidence, and PD-L1 positive and  positive TC 1+60% with a benefit of atezolizumab in the adjuvant setting, and cemiplimab level 1 evidence.  -ALK negative/fusion not detected by RNA in the tumor, BRAF mutation not detected, EGFR mutation not detected, MET variant transcript not detected, RET fusion not detected, ROS1 fusion not detected.  Tumor mutation burden was high at 18, microsatellite stable.  KRAS pG12V found and 61%, TP53 zP644V found in 56%, NFKBIA was amplified    -9/25/2024 NM PET/CT skull initial staging: Heterogeneous right upper lobe lung mass extending into the superior right lower lobe, encasing the right mainstem bronchus,  encasing and narrowing the right upper lobe bronchus, is hypermetabolic (mSUV 8.76), consistent with malignancy. It has heterogeneous areas of photopenia, suggesting internal necrosis. Pre-carinal lymph node measuring 14 mm short axis is FDG avid (mSUV 4.0), consistent with malignancy.  No hybrid avidity in the neck, abdomen, pelvis, or skeleton. No left hilar adenopathy. No suspicious left lung nodules. Normal heart size with coronary calcifications. Benign calcified mediastinal and left hilar lymph nodes are present.    -10/2/2024 Patient presents in follow up with her niece reporting better nonproductive cough and rib pain since being oxycodone low dose, she still denies having hemoptysis, wheezing, dyspnea, fever, or unintentional weight loss, new bony pains, no new H/A or focal neuro deficit except right under arm new tingling/burning. Got SIMMED yesterday for radiation.  She has a history of smoking 1 PPD- started at 17, still on 1/2 PPD - gum stuck to dentures.    -10/3/2024 B12 1000 mcg given IM  -10/10 s/p Mediport placement    -10/14/2024 started chemoradiation (cisplatin +pemetrexed) reporting still with nonproductive cough and rib pain since being oxycodone low dose. Picked up her nausea medications, got her port,  her Emla cream (ports still a little tender), started her folate soon as she got it.  Did get her chemo teach and got her B12 shot as well she is ready to go.    -11/4/2024 Follow up for C2 chemoradiation reporting nonproductive cough and rib pain both better since being oxycodone low dose, using stool softener and yogurt which has greatly helped with stooling appropriately (EOD), patient had a lot of nausea the first 9 days, needing the zofran Q8H that time, a lot of fatigue.  -11/22/2024.  Patient completed radiation therapy to a total dose of 60 Gray (2 Gray per fraction with total of 30 fractions (under Dr. Ivan Dasilva).    -12/2/2024 Follow up for C3 chemotherapy, completed  radiation last week.  She is reporting more productive cough (mostly clear and thick occ some green); reports when she is drinking warm liquids or cold liquids he is still having pain when she tries to swallow the temperature seems to go down her throat where she chokes a little bit and then seems to radiate over to the upper right side of her chest this is relatively new.  Denies having any fevers, chills, pleuritic chest pain, shortness of breath in fact breathing seems to be easier.  She feels wiped out and rib pain better since being oxycodone low dose, using stool softener and yogurt which has greatly helped with stooling appropriately (EOD), patient still had a lot of nausea the first 9 days, even with addition of Zyprexa, needing the zofran Q8H that time, and admits that she is still taking the dexamethasone as prescribed day before, day of, day after and those are proximal to starting day of.    -Reviewed the report of the CT chest done at [facility] a few days ago shows some improvement of the size of her tumor and no concern of PNA or TE fistula.     -12/19/2024 Patient presents in follow up to ensure she will be ready for C4 chemotherapy on 12/31.  She is reporting the farther she gets from radiation, the more improvement she has in the productive cough (mostly clear and thick occ some green); reports also some improvement in the sensation of when she is drinking warm liquids or cold liquids not having as frequent or as severe pain when she tries to swallow the temperature seems to go down her throat, nor is she choking as much or radiating- over to the upper right side of her chest.  Denies having any fevers, chills, pleuritic chest pain, shortness of breath in fact breathing seems to be easier.  Her energy is a littler better, and rib pain better since completion of radiation;   -medicare finally filling the next oxycodone low dose Rx ordered by Dr Dasilva (also has Narcan)  -using stool softener and  yogurt which has greatly helped with stooling appropriately (EOD)  -patient still had a lot of nausea the first 9 days, even with addition of Zyprexa (needs refill for C4), needing the zofran Q8H that time,   -still taking the dexamethasone as prescribed day before, day of, day after and those are proximal to starting day of (needs refill for C4).    -1/16/2025 Got COVID H/A sore throat, body aches-all better except for back- in the middle, down towards the bottom (never ran fever) Her original productive cough-(mostly clear and thick occ some green); got worse with COVID- still worse, still not better. No shortness of breath. Because of this, has missed her 4th and last dose of chemo. Still not feeling well enough for in person visit today.    2/4/2025 1st imfinzi  -2/13/2025  NM bone scan:Increased uptake in the right third rib posteriorly corresponding to the area of tumor invasion locally in the right upper lobe. No evidence of distant metastatic disease.     -2/24/2025 CT C/A/P w/contrast: Visualized soft tissue of the lower neck demonstrate right-sided port catheter which is coiled within the internal jugular vein with a small amount of fibrin sheath or clot surrounding the catheter. The catheter tip terminates at the junction of the IJ vein and the right subclavian vein, stable prior. Precarinal lymph node decreased in size measuring 8 mm, previously 13 mm. No new mediastinal adenopathy. No pulmonary embolus. The thoracic aortic arch branch vasculature is patent. Within the RUL redemonstration of a mass extending to the right apex with size mildly decreased in the prior study with a representative measurement of 6.8 x 6.5 cm, previously 7.2 x 7.8 cm (4/45) with extension through the pleura to involve the right posterior central third rib as seen on the prior study with increased sclerosis at this site with no new rib involvement. Mass again extends to the right hilum with narrowing of the RUL bronchus and  "obstruction of the RUL bronchus posterior segment and extends across the major pulmonary fissure to involve the medial superior segment of the RLL. No new or enlarging nodule. Mild superior endplate fracture at T4 level unchanged. Mild superior endplate compression fracture at T2 new from the prior study. No retropulsed fracture fragment. Liver is mildly enlarged measuring 20 cm in craniocaudal length. No suspicious liver lesion. The spleen is normal in size. The adrenal glands are normal. Moderate amount of stool in the colon. Small fat-containing paraumbilical hernia. No inguinal adenopathy. No retroperitoneal or central mesenteric adenopathy. No aggressive osseous lesion or acute fracture.     -2/28/2025 MRI T spine done- Subacute appearing compression deformity of the superior endplate of T2. Chronic mild compression deformity of superior plate of T4. No bony retropulsion, subluxation, or canal compromise. Rounded enhancing lesion is seen within the T5 vertebral body left of midline measuring 1.3 x 1.1 cm (series 11 image 22, series 7 image 8), consistent with metastatic disease. Questionable T7 enhancing lesion posteriorly and left of midline measuring 1.2 x 1.2 cm on series 7 image 5, without confident axial correlate, suspicious for metastatic involvement.-findings suspicious for metastatic disease involving T2, T3, as well. 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    -3/4/2025 Pt reports for 2nd imfinzi, her prior COVID s/s (H/A, sore throat, body aches)-still resolved   -Back pain is continued-still different pain from what she had on presentation and still ongoing- in the middle, can radiate down towards the bottom, denies having any popping sensations or crunching feeling. Asking for more pain medication \"Dr Dasilva wouldn't refill it because you did last time.\" Dr Dasilva had scheduled an MRI of the back last week and referred for ?IR kyphoplasty +/- bone biopsy.  -Her original productive " "cough- (mostly clear and thick occ some green-unchanged); No shortness of breath unless it is a long cough.        Subjective:  -3/10/2025 PET/CT:No abnormal FDG activity in H&N. Increased uptake in the right lobe of the thyroid (may represent a thyroid nodule, no nodule is identified on CT scan, mSUV 6). Redemonstration of RUL mass with cavitation. Significant decrease in size and FDG activity compared to the prior exam (mSUV 12.5), difficult to measure precisely, approximately 3.8 cm in diameter, decreased from prior (4.6 cm). Rim of mild FDG activity surrounding the cavitary portion, which is also decreased in size and activity compared to prior. There remains mild, persistent erosion into the right posterior third rib with mild residual FDG activity (mSUV 3.7). No extension into the chest wall or new pulmonary nodules. Pretracheal lymph node remains nonenlarged without FDG activity. No abnormal FDG activity. No abnormal FDG activity identified to correspond to lesion seen on recent MRI; no abnormal CT appearance to correspond to the lesions on MRI. No osseous lesions in the pelvis. Mild compression deformities of T2 and T4 superior endplates.       -4/1/2025 Pt reports for 3rd imfinzi, her back pain is continued-In the upper middle and the middle, can radiate down towards the bottom and through her leg; still denies having any popping sensations or crunching feeling. Asking for more pain medication as has been out for 5 days; reports was taking as 7.5 mg Q8 H, was only lasting for 5-6 hr and bringing the sharp pain down less than 50%. Had uptitrated the gabapentin to 100mg AM, 200 PM with some relief in the pain and sciatica feeling but still having significant pain.  -Her original productive cough- (mostly clear and thick occ some green) relatively unchanged, still smoking 1/2 PPD \"so I don't ** somebody\"; No shortness of breath unless it is a long cough.      -4/8/2025 CT guided biopsy scheduled; concern the " lesion is too high to safely do kyphoplasty.       Past Medical History:   Diagnosis Date    Arthritis     Cancer     skin cancer on leg right    GERD (gastroesophageal reflux disease)      Past Surgical History:   Procedure Laterality Date    BRONCHOSCOPY N/A 2024    Procedure: BRONCHOSCOPY WITH BRONCHIAL WASHING ,ENDOBRONCHIAL ULTRASOUND WITH FINE NEEDLE ASPIRATION X2 AREAS;  Surgeon: Crystal Powell MD;  Location: [facility] ENDOSCOPY;  Service: Pulmonary;  Laterality: N/A;     SECTION      x3    KNEE SURGERY Left     ORIF, screws and plates, has had Operation on it x 3       Current Outpatient Medications:     albuterol sulfate  (90 Base) MCG/ACT inhaler, Inhale 1 puff Every 4 (Four) Hours As Needed for Wheezing., Disp: , Rfl:     Breztri Aerosphere 160-9-4.8 MCG/ACT aerosol inhaler, Inhale 2 puffs 2 (Two) Times a Day., Disp: , Rfl:     famotidine (PEPCID) 20 MG tablet, Take 1 tablet by mouth 2 (Two) Times a Day., Disp: 30 tablet, Rfl: 2    Glucose Blood (Blood Glucose Test Strips 333) strip, Check sugar twice a day and PRN and keep a log In Vitro, Disp: , Rfl:     lidocaine-prilocaine (EMLA) 2.5-2.5 % cream, Apply 1 Application topically to the appropriate area as directed Every 2 (Two) Hours As Needed for Mild Pain. To port site before chemo/blood draw, Disp: 30 g, Rfl: 2    metFORMIN (GLUCOPHAGE) 1000 MG tablet, Every 12 (Twelve) Hours., Disp: , Rfl:     naloxone (NARCAN) 4 MG/0.1ML nasal spray, Call 911. Don't prime. Farner in 1 nostril for overdose. Repeat in 2-3 minutes in other nostril if no or minimal breathing/responsiveness., Disp: 2 each, Rfl: 0    ondansetron ODT (ZOFRAN-ODT) 8 MG disintegrating tablet, Take 1 tablet by mouth Every 8 (Eight) Hours As Needed for Nausea or Vomiting., Disp: 45 tablet, Rfl: 3    prochlorperazine (COMPAZINE) 10 MG tablet, Take 1 tablet by mouth Every 6 (Six) Hours As Needed for Nausea or Vomiting., Disp: 60 tablet, Rfl: 1    gabapentin (NEURONTIN) 300 MG  capsule, Take 1 capsule by mouth 3 (Three) Times a Day., Disp: 90 capsule, Rfl: 3    oxyCODONE (OXY-IR) 5 MG capsule, Take 2 capsules by mouth Every 6 (Six) Hours As Needed for Moderate Pain or Severe Pain for up to 30 days. For neoplasm pain; titrate down as tolerated., Disp: 240 capsule, Rfl: 0    sennosides-docusate (senna-docusate sodium) 8.6-50 MG per tablet, Take 2 tablets by mouth Daily., Disp: 60 tablet, Rfl: 3  No current facility-administered medications for this visit.    Facility-Administered Medications Ordered in Other Visits:     durvalumab (IMFINZI) 1,500 mg in sodium chloride 0.9 % 280 mL chemo IVPB, 1,500 mg, Intravenous, Once, Veronica Huynh MD PhD    heparin injection 500 Units, 500 Units, Intravenous, PRN, Veronica Huynh MD PhD    sodium chloride 0.9 % flush 10 mL, 10 mL, Intravenous, PRN, Veronica Huynh MD PhD    sodium chloride 0.9 % infusion, 20 mL/hr, Intravenous, Once, Veronica Huynh MD PhD    No Known Allergies    Family History   Problem Relation Age of Onset    Lung cancer Brother         Lung cancer/Leukemia    Cancer Brother      Cancer-related family history includes Cancer in her brother; Lung cancer in her brother.    Social History     Tobacco Use    Smoking status: Every Day     Current packs/day: 0.50     Average packs/day: 0.5 packs/day for 48.2 years (24.1 ttl pk-yrs)     Types: Cigarettes     Start date: 1977    Smokeless tobacco: Never   Vaping Use    Vaping status: Never Used   Substance Use Topics    Alcohol use: Not Currently    Drug use: Never     Social History     Social History Narrative    Not on file      Review of Systems:  Constitutional: +fatigue (slightly improved), Denies any weakness, lack of appetite, excessive appetite, weight change, chills or fever.  Eyes: Reports no blurry vision, no double vision, no pain in the eyes, dry eyes or tearing.  ENT: Reports no decreased hearing capacity, ear pain or tinnitus. Denies any  epistaxis, nasal congestion, mouth sores or bleeding, tooth or jaw pain, sore throat or hoarseness.  Respiratory: + chronic cough (better with oxy)  Denies any sputum production or hemoptysis. There is no wheezing, shortness of breath or any other respiratory complaints.  Cardiovascular: + shortness of breath with activity (slightly improved), Denies any chest pain, shortness of breath when lying down, palpitations, or leg swelling.  Breasts: Denies any lumps, bumps, pain, nipple changes or discharge in the breasts.  Gastrointestinal: painful swallowing (resolved), nausea (resolved), vomiting (resolved), constipation (better per HPI) There are no complaints of abdominal pain, difficulty swallowing or anorexia, diarrhea,  heartburn, blood in the stools, dark stools, or change in bowel habits or hemorrhoids.  Genitourinary: Denies any pain or burning on urination, blood in the urine or frequent urination.   Musculoskeletal: +upper and mid back pain (continued per HPI), Denies painful joints, no swelling of the joints, muscle pain. Denies any pain or tingling in the legs, hands or feet.  Neuropsychiatric: Denies any nervousness, depressed mood, headaches, blackouts, dizziness, weakness of limbs, loss of sensation, loss of balance, loss of coordination or difficulty in speaking.  Cutaneous: Denies any dry skin, itching, rash, change in the color of the skin, or any other cutaneous complaints.  Hematologic/Lymphatic: Denies any bruising or bleeding. Report no recent development of palpable or painful lymph nodes.  Allergy/Immunology: Denies rash/hayfever symptoms or any recent history of pneumonia, recurrent sinusitis, urinary infection or any other history of an ongoing infection.  Endocrine: Reports no intolerance to heat or cold, excessive thirst or excessive urination.    Objective:  Vital signs:  Vitals:    04/01/25 1313   BP: 113/77   Pulse: 94   Temp: 97.7 °F (36.5 °C)   TempSrc: Oral   SpO2: 100%   Weight: 72.4  "kg (159 lb 9.6 oz)   Height: 172.7 cm (67.99\")   PainSc: 6    PainLoc: Back       Body mass index is 24.27 kg/m².      Physical Exam:   ECO  GENERAL: The patient is a pleasant middle aged white female who appears their stated age and is awake, in no acute distress, alert and oriented x3.  SKIN: No rash or ulcers.  HEME/LYMPHATICS: No bruising or petechiae on visual inspection. No lymphadenopathy on visual inspection and palpation of cervical, supraclavicular, infraclavicular lymph nodes.  HEAD/FACE: atraumatic and normocephalic. Normal hair.  EYES: PERRLA/EOMI. No scleral icterus. No tearing or dry eyes.  ENT: External ears normal with no evidence of discharge. No evidence of ulceration or bleeding in the nostrils. Mouth has no ulcers, bleeding or inflammation of the gums, floor or roof of the mouth with normal dentition.  NECK: Supple, symmetric.   CHEST/RESPIRATORY: Expansion maintained bilaterally and symmetrically. On auscultation, clear breath sounds in left lung, decreased breath sounds in upper right lung field (unchanged). No wheezes, rhonchi or rales.  BREAST: Deferred.  CARDIOVASCULAR: On auscultation, regular rate and rhythm. No murmurs, gallops or rubs are heard.  GASTROINTESTINAL/ABDOMEN: Symmetric. On auscultation of the abdomen, there are normal bowel sounds in all four quadrants. There are no surgical scars in the abdomen. Nontender to palpation.  GENITOURINARY: Deferred.  NEUROLOGIC: Alert and oriented time, person, and place, with no apparent changes in recent or remote memory. Cranial nerves II-XII are grossly intact. Sensation is maintained in the extremities. Strength and tone appear normal for age and equal in the extremities. Gait is normal.  MUSCULOSKELETAL: + TTP on midback for most of thoracic spine and paraspinous mm. No cyanosis, clubbing or edema in the extremities. There are no surgical scars on the extremities.  PSYCHIATRIC: The patient maintains judgment and has good insight. The " "patient has no changes in mood or affection.  IV: right chest Mediport accessed, NTTP, no erythema or edema.    Lab Results - Last 18 Months   Lab Units 04/01/25  1325 03/04/25  1259 02/04/25  1240   WBC 10*3/mm3 6.61 5.20 6.50   HEMOGLOBIN g/dL 12.5 11.6* 12.3   HEMATOCRIT % 38.7 36.6 38.5   PLATELETS 10*3/mm3 292 283 271   MCV fL 90.4 92.7 92.8     Lab Results - Last 18 Months   Lab Units 04/01/25  1325 03/04/25  1259 02/04/25  1240   SODIUM mmol/L 137 136 138   POTASSIUM mmol/L 4.2 4.0 4.1   CHLORIDE mmol/L 102 100 102   CO2 mmol/L 24.5 25.6 26.4   BUN mg/dL 11 12 14   CREATININE mg/dL 0.85 0.82 0.84   CALCIUM mg/dL 9.9 10.0 10.0   BILIRUBIN mg/dL <0.2 0.2 <0.2   ALK PHOS U/L 90 87 91   ALT (SGPT) U/L 11 17 18   AST (SGOT) U/L 12 15 15   GLUCOSE mg/dL 107* 129* 127*       Lab Results   Component Value Date    GLUCOSE 107 (H) 04/01/2025    BUN 11 04/01/2025    CREATININE 0.85 04/01/2025    BCR 12.9 04/01/2025    K 4.2 04/01/2025    CO2 24.5 04/01/2025    CALCIUM 9.9 04/01/2025    ALBUMIN 4.0 04/01/2025    AST 12 04/01/2025    ALT 11 04/01/2025       Lab Results - Last 18 Months   Lab Units 10/10/24  0810 08/26/24  0945   INR  <0.93* 0.93   APTT seconds 30.8 27.1       No results found for: \"IRON\", \"TIBC\", \"FERRITIN\"    No results found for: \"FOLATE\"    No results found for: \"OCCULTBLD\"    No results found for: \"RETICCTPCT\"  No results found for: \"DNQQDQZO22\"  No results found for: \"SPEP\", \"UPEP\"  No results found for: \"LDH\", \"URICACID\"  No results found for: \"BEBETO\", \"RF\", \"SEDRATE\"  No results found for: \"FIBRINOGEN\", \"HAPTOGLOBIN\"  Lab Results   Component Value Date    PTT 30.8 10/10/2024    INR <0.93 (L) 10/10/2024     No results found for: \"\"  No results found for: \"CEA\"  No components found for: \"CA-19-9\"  No results found for: \"PSA\"  No results found for: \"SEDRATE\"    Assessment & Plan     65 y.o. female with PMH of smoking, skin cancer, arthritis, GERD who presented 8/2024  for newly diagnosed lung " cancer, s/p definitive chemoradiation (completed 11/22/2024), currently on immunotherapy.    Right hilar mass/right upper lobe adenocarcinoma, at minimum T4 N2 (IIIB); 2/28/2025 with pain and imaging concerning for progression to stage IV (bone)    Previously discussed the above results which include imaging and pathology with the patient.  -Staging PET/CT no metastatic disease but confirmed IIIB disease, nonsurgical candidate-previously discussed this in detail with the patient and her family and explained that the standard of care at this point would be chemoradiation with curative intent.  Discussed the couple chemotherapy regimens that can be used for radiation.  -Staging brain MRI negative for metastasis    -Radiology Oncology on board: pt completed her concurrent chemoradiation -11/22/2024  -PFTs from pulmonologist done per patient- per patient 50-60%    -Biomarker testing +PD-L1 testing (category 1), +KRAS G12C, negative EGFR mutation including exon 19 del, L858R and other deletions, exon 20 insertions, (category 1), ALK fusions (category 1), ROS1 fusions, BRAF V600E, NTRK 1/2/3 fusions, MET exon 14 skipping or amplifications, RET fusions, ErbB2 (HER2) alterations,    -10/10/24 s/p Mediport placement with EMLA cream available  Didn't get cycle 4 chemotherapy on 12/31/2024 given COVID    -12/18/2024 PRIORITY CT chest scan already showing improvement in lung cancer size with no signs of progression near end of radiation completion showing improvement in lung cancer. Cannot do repeat imaging at this time given recent COVID infection (has been known to make lung nodules on imaging)  -1/16/25 Discussed durvalumab maintenance therapy every 2-4 weeks for one year per NCCN guidelines and FDA approval. We discussed that from the [facility] trial data, in stage III NSCLC nonresectable patients that receive durvalumab post definitive chemoradiation vs. those that did not, have a 48% reduction in risk of progression. 5 year  post hoc analysis showed median overall survival with durvalumab was 47.5 months vs. 29.1 months with placebo. HR 0.72. We previously discussed that durvalumab is a PD-L1 inhibitor and classified as immunotherapy. Patient was provided a [facility] information sheet on Durvalumab. We discussed side effects including but not limited to immune mediated effects such as pneumonitis, colitis and hepatitis as well as nausea, diarrhea, fatigue, electrolyte abnormalities and infusion reactions.    PLAN: cisplatin plus pemetrexed Q21 days X 3 cycles with concurrent radiation with repeat imaging showing improvement no progression (completed), followed by durvalumab 1500 mg Q4 weeks x 12 cycles (category 1 for stage III)-ONGOING     -2/24/2024 CT C/A/P w/contrast: Right-sided port catheter coiled within the IJ vein with a small amount of fibrin sheath or clot surrounding the catheter. The catheter tip terminates at the junction of the IJ vein and the right subclavian vein, stable prior. Precarinal LN decreased in size measuring 8 mm, previously 13 mm. No new mediastinal adenopathy. RUL redemonstration of a mass extending to the right apex with size mildly decreased in the prior study with a representative measurement of 6.8 x 6.5 cm, previously 7.2 x 7.8 cm with extension through the pleura to involve the right posterior central third rib as seen on the prior study with increased sclerosis at this site with no new rib involvement. Mass again extends to the right hilum with narrowing of the RUL bronchus and obstruction of the RUL bronchus posterior segment and extends across the major pulmonary fissure to involve the medial superior segment of the RLL. No new or enlarging nodule. Mild superior endplate fracture at T4 level unchanged. Mild superior endplate compression fracture at T2 new from the prior study. No retropulsed fracture fragment. Liver is mildly enlarged measuring 20 cm in craniocaudal length. No aggressive osseous  lesion or acute fracture.    -precert approved 1/17/25, given labs within acceptable parameters and patient H&P within acceptable parameters, proceed with C3 today and RTC for RTC in 4 weeks with labs for C4D1  -4/1/2025 discussed possibly adding bisphosphonate/denosumab if biopsy shows cancer in the bone; will not need dental clearance (has full dentures). Also discussed with patient do not want to call this immunotherapy failure as she was not on immunotherapy during chemoradiation and all the s/s started just prior to start of immunotherapy and she has high PDL1.  -*Data has shown a Palliative consult to help manage symptoms early on in care for advanced stage lung cancer patients-pt had been referred to [facility] by [facility] but unfortunately not in pt's network.      2. Worsening mid-level back pain, sub-acute  -2/4/2025 patient reporting not the same kind of pain she had on presentation (with the local invasion of the lung cancer into local tissues), getting more consistent aching pain. Was getting Светлана 7.5 liquid and using 1-2 times a day, occ up to 3X/day.    -3/4/2025: Re-ordered Светлана 7.5 mg (1.5 tablets) every 8 hours as needed for pain, 14-day supply.  -Patient already knows about bowel regimens do not get constipated.  -2/13/2025 NM bone scan: Increased uptake in the right third rib posteriorly corresponding to the area of tumor invasion locally in the right upper lobe. No evidence of distant metastatic disease.   -2/24/25 CT C/A/P as in #1: New mild superior endplate compression fracture at T2;   -2/28/2025 MRI T spine done- Subacute appearing compression deformity of the superior endplate of T2. Chronic mild compression deformity of superior plate of T4. No bony retropulsion, subluxation, or canal compromise. Rounded enhancing lesion is seen within the T5 vertebral body left of midline measuring 1.3 x 1.1 cm (series 11 image 22, series 7 image 8), consistent with metastatic disease. Questionable T7  enhancing lesion posteriorly and left of midline measuring 1.2 x 1.2 cm on series 7 image 5, without confident axial correlate, suspicious for metastatic involvement.-findings suspicious for metastatic disease involving T2, T3, as well. 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  -3/10/2025 PET/CT: Increased uptake in the right lobe of the thyroid (may represent a thyroid nodule, no nodule is identified on CT scan, mSUV 6). Redemonstration of RUL mass with cavitation. Significant decrease in size and FDG activity compared to the prior exam (mSUV 12.5), difficult to measure precisely, approximately 3.8 cm in diameter, decreased from prior (4.6 cm). Rim of mild FDG activity surrounding the cavitary portion, which is also decreased in size and activity compared to prior. There remains mild, persistent erosion into the right posterior third rib with mild residual FDG activity (mSUV 3.7). No extension into the chest wall or new pulmonary nodules. No abnormal FDG activity identified to correspond to lesion seen on recent MRI; no abnormal CT appearance to correspond to the lesions on MRI. No osseous lesions in the pelvis. Mild compression deformities of T2 and T4 superior endplates.  -4/1/2025: Re-ordered Светлана 10 mg every 6-8 hours as needed for pain, 30-day supply; also switching from gabapentin 100 AM and 200 PM to titrating up gabapentin 300 TID as discussed.  -continue to monitor      3. Tobacco dependence  -tried nicotine gum but stuck to dentures, is 1/2 ppd (but only 1/2 a cigarette when she smokes)      4.  Hyperglycemia, noted after start of treatment to 300s  -Patient started on 1000 mg metformin daily by PCP, BS still over 300 today, patient had eaten.  -Continue to monitor; patient has had metformin changed      5. Elevated TSH C3 immunotherapy  -4/1/2025 may be immunotherapy related; T4 normal  -will continue to monitor      Thank you very much for providing the opportunity to participate in this  patient’s care. Please do not hesitate to call if there are any other questions.

## 2025-04-01 ENCOUNTER — HOSPITAL ENCOUNTER (OUTPATIENT)
Dept: ONCOLOGY | Facility: HOSPITAL | Age: 65
Discharge: HOME | End: 2025-04-01
Payer: MEDICARE

## 2025-04-01 ENCOUNTER — OFFICE VISIT (OUTPATIENT)
Dept: ONCOLOGY | Facility: CLINIC | Age: 65
End: 2025-04-01
Payer: MEDICARE

## 2025-04-01 VITALS
BODY MASS INDEX: 24.19 KG/M2 | SYSTOLIC BLOOD PRESSURE: 113 MMHG | WEIGHT: 159.6 LBS | HEART RATE: 94 BPM | HEIGHT: 68 IN | DIASTOLIC BLOOD PRESSURE: 77 MMHG | OXYGEN SATURATION: 100 % | TEMPERATURE: 97.7 F

## 2025-04-01 DIAGNOSIS — C77.1 NON-SMALL CELL LUNG CANCER METASTATIC TO INTRATHORACIC LYMPH NODE: ICD-10-CM

## 2025-04-01 DIAGNOSIS — C34.90 NON-SMALL CELL LUNG CANCER METASTATIC TO INTRATHORACIC LYMPH NODE: ICD-10-CM

## 2025-04-01 DIAGNOSIS — C34.11 PRIMARY ADENOCARCINOMA OF UPPER LOBE OF RIGHT LUNG: Primary | ICD-10-CM

## 2025-04-01 DIAGNOSIS — Z51.81 ENCOUNTER FOR THERAPEUTIC DRUG MONITORING: ICD-10-CM

## 2025-04-01 DIAGNOSIS — C34.11 MALIGNANT NEOPLASM OF UPPER LOBE OF RIGHT LUNG: ICD-10-CM

## 2025-04-01 DIAGNOSIS — C34.11 MALIGNANT NEOPLASM OF UPPER LOBE OF RIGHT LUNG: Primary | ICD-10-CM

## 2025-04-01 DIAGNOSIS — Z29.89 ENCOUNTER FOR IMMUNOTHERAPY: ICD-10-CM

## 2025-04-01 DIAGNOSIS — Z79.899 ENCOUNTER FOR LONG-TERM (CURRENT) USE OF HIGH-RISK MEDICATION: ICD-10-CM

## 2025-04-01 DIAGNOSIS — G89.3 NEOPLASM RELATED PAIN: ICD-10-CM

## 2025-04-01 LAB
ALBUMIN SERPL-MCNC: 4 G/DL (ref 3.5–5.2)
ALBUMIN/GLOB SERPL: 1.3 G/DL
ALP SERPL-CCNC: 90 U/L (ref 39–117)
ALT SERPL W P-5'-P-CCNC: 11 U/L (ref 1–33)
ANION GAP SERPL CALCULATED.3IONS-SCNC: 10.5 MMOL/L (ref 5–15)
AST SERPL-CCNC: 12 U/L (ref 1–32)
BASOPHILS # BLD AUTO: 0.03 10*3/MM3 (ref 0–0.2)
BASOPHILS NFR BLD AUTO: 0.5 % (ref 0–1.5)
BILIRUB SERPL-MCNC: <0.2 MG/DL (ref 0–1.2)
BUN SERPL-MCNC: 11 MG/DL (ref 8–23)
BUN/CREAT SERPL: 12.9 (ref 7–25)
CALCIUM SPEC-SCNC: 9.9 MG/DL (ref 8.6–10.5)
CHLORIDE SERPL-SCNC: 102 MMOL/L (ref 98–107)
CO2 SERPL-SCNC: 24.5 MMOL/L (ref 22–29)
CREAT SERPL-MCNC: 0.85 MG/DL (ref 0.57–1)
DEPRECATED RDW RBC AUTO: 43 FL (ref 37–54)
EGFRCR SERPLBLD CKD-EPI 2021: 76.1 ML/MIN/1.73
EOSINOPHIL # BLD AUTO: 0.09 10*3/MM3 (ref 0–0.4)
EOSINOPHIL NFR BLD AUTO: 1.4 % (ref 0.3–6.2)
ERYTHROCYTE [DISTWIDTH] IN BLOOD BY AUTOMATED COUNT: 13.3 % (ref 12.3–15.4)
GLOBULIN UR ELPH-MCNC: 3.1 GM/DL
GLUCOSE SERPL-MCNC: 107 MG/DL (ref 65–99)
HCT VFR BLD AUTO: 38.7 % (ref 34–46.6)
HGB BLD-MCNC: 12.5 G/DL (ref 12–15.9)
LYMPHOCYTES # BLD AUTO: 1.16 10*3/MM3 (ref 0.7–3.1)
LYMPHOCYTES NFR BLD AUTO: 17.5 % (ref 19.6–45.3)
MCH RBC QN AUTO: 29.2 PG (ref 26.6–33)
MCHC RBC AUTO-ENTMCNC: 32.3 G/DL (ref 31.5–35.7)
MCV RBC AUTO: 90.4 FL (ref 79–97)
MONOCYTES # BLD AUTO: 0.42 10*3/MM3 (ref 0.1–0.9)
MONOCYTES NFR BLD AUTO: 6.4 % (ref 5–12)
NEUTROPHILS NFR BLD AUTO: 4.91 10*3/MM3 (ref 1.7–7)
NEUTROPHILS NFR BLD AUTO: 74.2 % (ref 42.7–76)
PLATELET # BLD AUTO: 292 10*3/MM3 (ref 140–450)
PMV BLD AUTO: 8.3 FL (ref 6–12)
POTASSIUM SERPL-SCNC: 4.2 MMOL/L (ref 3.5–5.2)
PROT SERPL-MCNC: 7.1 G/DL (ref 6–8.5)
RBC # BLD AUTO: 4.28 10*6/MM3 (ref 3.77–5.28)
SODIUM SERPL-SCNC: 137 MMOL/L (ref 136–145)
T4 FREE SERPL-MCNC: 1.15 NG/DL (ref 0.93–1.7)
TSH SERPL DL<=0.05 MIU/L-ACNC: 7.59 UIU/ML (ref 0.27–4.2)
WBC NRBC COR # BLD AUTO: 6.61 10*3/MM3 (ref 3.4–10.8)

## 2025-04-01 PROCEDURE — 25010000002 HEPARIN LOCK FLUSH PER 10 UNITS: Performed by: INTERNAL MEDICINE

## 2025-04-01 PROCEDURE — 25010000002 DURVALUMAB 50 MG/ML SOLUTION 10 ML VIAL: Performed by: INTERNAL MEDICINE

## 2025-04-01 PROCEDURE — 84439 ASSAY OF FREE THYROXINE: CPT | Performed by: INTERNAL MEDICINE

## 2025-04-01 PROCEDURE — 96413 CHEMO IV INFUSION 1 HR: CPT

## 2025-04-01 PROCEDURE — 84443 ASSAY THYROID STIM HORMONE: CPT | Performed by: INTERNAL MEDICINE

## 2025-04-01 PROCEDURE — 85025 COMPLETE CBC W/AUTO DIFF WBC: CPT | Performed by: INTERNAL MEDICINE

## 2025-04-01 PROCEDURE — 99214 OFFICE O/P EST MOD 30 MIN: CPT | Performed by: INTERNAL MEDICINE

## 2025-04-01 PROCEDURE — 25810000003 SODIUM CHLORIDE 0.9 % SOLUTION 250 ML FLEX CONT: Performed by: INTERNAL MEDICINE

## 2025-04-01 PROCEDURE — 80053 COMPREHEN METABOLIC PANEL: CPT | Performed by: INTERNAL MEDICINE

## 2025-04-01 PROCEDURE — 1125F AMNT PAIN NOTED PAIN PRSNT: CPT | Performed by: INTERNAL MEDICINE

## 2025-04-01 RX ORDER — HEPARIN SODIUM (PORCINE) LOCK FLUSH IV SOLN 100 UNIT/ML 100 UNIT/ML
500 SOLUTION INTRAVENOUS AS NEEDED
Status: DISCONTINUED | OUTPATIENT
Start: 2025-04-01 | End: 2025-04-02 | Stop reason: HOSPADM

## 2025-04-01 RX ORDER — SODIUM CHLORIDE 0.9 % (FLUSH) 0.9 %
10 SYRINGE (ML) INJECTION AS NEEDED
Status: DISCONTINUED | OUTPATIENT
Start: 2025-04-01 | End: 2025-04-02 | Stop reason: HOSPADM

## 2025-04-01 RX ORDER — SODIUM CHLORIDE 9 MG/ML
20 INJECTION, SOLUTION INTRAVENOUS ONCE
Status: CANCELLED | OUTPATIENT
Start: 2025-04-01

## 2025-04-01 RX ORDER — OXYCODONE HYDROCHLORIDE 5 MG/1
10 CAPSULE ORAL EVERY 6 HOURS PRN
Qty: 240 CAPSULE | Refills: 0 | Status: SHIPPED | OUTPATIENT
Start: 2025-04-01 | End: 2025-05-01

## 2025-04-01 RX ORDER — AMOXICILLIN 250 MG
2 CAPSULE ORAL DAILY
Qty: 60 TABLET | Refills: 3 | Status: SHIPPED | OUTPATIENT
Start: 2025-04-01

## 2025-04-01 RX ORDER — SODIUM CHLORIDE 0.9 % (FLUSH) 0.9 %
10 SYRINGE (ML) INJECTION AS NEEDED
OUTPATIENT
Start: 2025-04-01

## 2025-04-01 RX ORDER — GABAPENTIN 300 MG/1
300 CAPSULE ORAL 3 TIMES DAILY
Qty: 90 CAPSULE | Refills: 3 | Status: SHIPPED | OUTPATIENT
Start: 2025-04-01

## 2025-04-01 RX ORDER — HEPARIN SODIUM (PORCINE) LOCK FLUSH IV SOLN 100 UNIT/ML 100 UNIT/ML
500 SOLUTION INTRAVENOUS AS NEEDED
OUTPATIENT
Start: 2025-04-01

## 2025-04-01 RX ORDER — SODIUM CHLORIDE 9 MG/ML
20 INJECTION, SOLUTION INTRAVENOUS ONCE
Status: DISCONTINUED | OUTPATIENT
Start: 2025-04-01 | End: 2025-04-02 | Stop reason: HOSPADM

## 2025-04-01 RX ADMIN — Medication 500 UNITS: at 16:15

## 2025-04-01 RX ADMIN — Medication 10 ML: at 16:15

## 2025-04-01 RX ADMIN — SODIUM CHLORIDE 1500 MG: 9 INJECTION, SOLUTION INTRAVENOUS at 15:09

## 2025-04-08 ENCOUNTER — HOSPITAL ENCOUNTER (OUTPATIENT)
Dept: CT IMAGING | Facility: HOSPITAL | Age: 65
Discharge: HOME | End: 2025-04-08
Admitting: RADIOLOGY
Payer: MEDICARE

## 2025-04-08 VITALS
SYSTOLIC BLOOD PRESSURE: 106 MMHG | HEIGHT: 68 IN | WEIGHT: 159 LBS | BODY MASS INDEX: 24.1 KG/M2 | HEART RATE: 92 BPM | OXYGEN SATURATION: 99 % | TEMPERATURE: 98.1 F | RESPIRATION RATE: 18 BRPM | DIASTOLIC BLOOD PRESSURE: 61 MMHG

## 2025-04-08 DIAGNOSIS — M89.9 LESION OF BONE OF THORACIC SPINE: ICD-10-CM

## 2025-04-08 LAB
APTT PPP: 30.4 SECONDS (ref 22.7–35.4)
BASOPHILS # BLD AUTO: 0.03 10*3/MM3 (ref 0–0.2)
BASOPHILS NFR BLD AUTO: 0.4 % (ref 0–1.5)
DEPRECATED RDW RBC AUTO: 41.7 FL (ref 37–54)
EOSINOPHIL # BLD AUTO: 0.13 10*3/MM3 (ref 0–0.4)
EOSINOPHIL NFR BLD AUTO: 1.6 % (ref 0.3–6.2)
ERYTHROCYTE [DISTWIDTH] IN BLOOD BY AUTOMATED COUNT: 12.9 % (ref 12.3–15.4)
HCT VFR BLD AUTO: 40 % (ref 34–46.6)
HGB BLD-MCNC: 12.9 G/DL (ref 12–15.9)
IMM GRANULOCYTES # BLD AUTO: 0.04 10*3/MM3 (ref 0–0.05)
IMM GRANULOCYTES NFR BLD AUTO: 0.5 % (ref 0–0.5)
INR PPP: 0.92 (ref 0.9–1.1)
LYMPHOCYTES # BLD AUTO: 1.35 10*3/MM3 (ref 0.7–3.1)
LYMPHOCYTES NFR BLD AUTO: 16.4 % (ref 19.6–45.3)
MCH RBC QN AUTO: 28.5 PG (ref 26.6–33)
MCHC RBC AUTO-ENTMCNC: 32.3 G/DL (ref 31.5–35.7)
MCV RBC AUTO: 88.5 FL (ref 79–97)
MONOCYTES # BLD AUTO: 0.62 10*3/MM3 (ref 0.1–0.9)
MONOCYTES NFR BLD AUTO: 7.5 % (ref 5–12)
NEUTROPHILS NFR BLD AUTO: 6.05 10*3/MM3 (ref 1.7–7)
NEUTROPHILS NFR BLD AUTO: 73.6 % (ref 42.7–76)
NRBC BLD AUTO-RTO: 0 /100 WBC (ref 0–0.2)
PLATELET # BLD AUTO: 284 10*3/MM3 (ref 140–450)
PMV BLD AUTO: 8.4 FL (ref 6–12)
PROTHROMBIN TIME: 12.3 SECONDS (ref 11.7–14.2)
RBC # BLD AUTO: 4.52 10*6/MM3 (ref 3.77–5.28)
WBC NRBC COR # BLD AUTO: 8.22 10*3/MM3 (ref 3.4–10.8)

## 2025-04-08 PROCEDURE — 85610 PROTHROMBIN TIME: CPT | Performed by: RADIOLOGY

## 2025-04-08 PROCEDURE — 25010000002 FENTANYL CITRATE (PF) 50 MCG/ML SOLUTION: Performed by: RADIOLOGY

## 2025-04-08 PROCEDURE — 85025 COMPLETE CBC W/AUTO DIFF WBC: CPT | Performed by: RADIOLOGY

## 2025-04-08 PROCEDURE — 25010000002 MIDAZOLAM PER 1 MG: Performed by: RADIOLOGY

## 2025-04-08 PROCEDURE — 76380 CAT SCAN FOLLOW-UP STUDY: CPT

## 2025-04-08 PROCEDURE — 25010000002 ONDANSETRON PER 1 MG: Performed by: RADIOLOGY

## 2025-04-08 PROCEDURE — 25810000003 SODIUM CHLORIDE 0.9 % SOLUTION: Performed by: RADIOLOGY

## 2025-04-08 PROCEDURE — 85730 THROMBOPLASTIN TIME PARTIAL: CPT | Performed by: RADIOLOGY

## 2025-04-08 RX ORDER — ONDANSETRON 2 MG/ML
INJECTION INTRAMUSCULAR; INTRAVENOUS AS NEEDED
Status: COMPLETED | OUTPATIENT
Start: 2025-04-08 | End: 2025-04-08

## 2025-04-08 RX ORDER — SODIUM CHLORIDE 9 MG/ML
75 INJECTION, SOLUTION INTRAVENOUS CONTINUOUS
Status: DISPENSED | OUTPATIENT
Start: 2025-04-08 | End: 2025-04-08

## 2025-04-08 RX ORDER — FENTANYL CITRATE 50 UG/ML
INJECTION, SOLUTION INTRAMUSCULAR; INTRAVENOUS AS NEEDED
Status: COMPLETED | OUTPATIENT
Start: 2025-04-08 | End: 2025-04-08

## 2025-04-08 RX ORDER — MIDAZOLAM HYDROCHLORIDE 1 MG/ML
INJECTION, SOLUTION INTRAMUSCULAR; INTRAVENOUS AS NEEDED
Status: COMPLETED | OUTPATIENT
Start: 2025-04-08 | End: 2025-04-08

## 2025-04-08 RX ORDER — HEPARIN SODIUM (PORCINE) LOCK FLUSH IV SOLN 100 UNIT/ML 100 UNIT/ML
500 SOLUTION INTRAVENOUS AS NEEDED
Status: DISCONTINUED | OUTPATIENT
Start: 2025-04-08 | End: 2025-04-09 | Stop reason: HOSPADM

## 2025-04-08 RX ADMIN — SODIUM CHLORIDE 75 ML/HR: 9 INJECTION, SOLUTION INTRAVENOUS at 08:20

## 2025-04-08 RX ADMIN — MIDAZOLAM 1 MG: 1 INJECTION INTRAMUSCULAR; INTRAVENOUS at 09:29

## 2025-04-08 RX ADMIN — FENTANYL CITRATE 50 MCG: 50 INJECTION, SOLUTION INTRAMUSCULAR; INTRAVENOUS at 09:29

## 2025-04-08 RX ADMIN — ONDANSETRON 4 MG: 2 INJECTION INTRAMUSCULAR; INTRAVENOUS at 09:29

## 2025-04-08 NOTE — PRE-PROCEDURE NOTE
.  [facility]   Interventional Radiology H&P    Patient Name: Laura Higginbotham  : 1960  MRN: [id number]  Primary Care Physician:  Laura Archer  Referring Physician: Ivan Dasilva MD  Date of admission: 2025    Subjective   Subjective     HPI:  Laura Higginbotham is a 65 y.o. female with lung CA with MRI + , PET/CT  - T5 bone lesion. No CT correlate.    Review of Systems:   Constitutional no fever,  no weight loss       Otolaryngeal no difficulty swallowing   Cardiovascular no chest pain   Pulmonary no cough, no sputum production   Gastrointestinal no constipation, no diarrhea                         Personal History       Past Medical/Surgical History:   Past Medical History:   Diagnosis Date    Arthritis     Cancer     skin cancer on leg right    GERD (gastroesophageal reflux disease)      Past Surgical History:   Procedure Laterality Date    BRONCHOSCOPY N/A 2024    Procedure: BRONCHOSCOPY WITH BRONCHIAL WASHING ,ENDOBRONCHIAL ULTRASOUND WITH FINE NEEDLE ASPIRATION X2 AREAS;  Surgeon: Crystal Powell MD;  Location: [facility];  Service: Pulmonary;  Laterality: N/A;     SECTION      x3    KNEE SURGERY Left     ORIF, screws and plates, has had Operation on it x 3       Social History:  reports that she has been smoking cigarettes. She started smoking about 48 years ago. She has a 24.1 pack-year smoking history. She has never used smokeless tobacco. She reports that she does not currently use alcohol. She reports that she does not use drugs.    Medications:  (Not in a hospital admission)    Current medications:     Current IV drips:  sodium chloride, 75 mL/hr        Allergies:  No Known Allergies    Objective    Objective     Vitals:   Temp:  [98.1 °F (36.7 °C)] 98.1 °F (36.7 °C)  Heart Rate:  [92] 92  Resp:  [12] 12  BP: (116)/(72) 116/72      Physical Exam:   Constitutional: Awake, alert, No acute distress    Respiratory: Clear to auscultation bilaterally, nonlabored respirations  "   Cardiovascular: RRR, no murmurs, rubs, or gallops, palpable pedal pulses bilaterally   Gastrointestinal: Positive bowel sounds, soft, nontender, nondistended        ASA SCALE ASSESSMENT:  2-Mild to moderate systemic disease, medically well controlled, with no functional limitation    MALLAMPATI CLASSIFICATION:  2-Able to visualize the soft palate, fauces, uvula. The anterior & posterior tonsilar pillars are hidden by the tongue.       Result Review        Result Review:     No results found for: \"NA\"    No results found for: \"K\"    No results found for: \"CL\"    No results found for: \"PLASMABICARB\"    No results found for: \"BUN\"    No results found for: \"CREATININE\"    No results found for: \"CALCIUM\"        No components found for: \"GLUCOSE.*\"  Results from last 7 days   Lab Units 04/08/25  0822   WBC 10*3/mm3 8.22   HEMOGLOBIN g/dL 12.9   HEMATOCRIT % 40.0   PLATELETS 10*3/mm3 284      Results from last 7 days   Lab Units 04/08/25  0822   INR  0.92           Assessment / Plan     Assesment:   T5 bone lesion      Plan:   If there is a CT correlate, bone biopsy.    The risks and benefits of the procedure were discussed with the patient.    Electronically signed by Russell Silva III, MD, 04/08/25, 9:24 AM EDT.   "

## 2025-04-09 NOTE — PROGRESS NOTES
HEMATOLOGY ONCOLOGY OUTPATIENT FOLLOW UP       Patient name: Laura Higginbotham  : 1960  MRN: [id number]  Primary Care Physician: Laura Archer APRN  Referring Physician: Laura Archer APRN  Reason For Consult: right hilar adenocarcinoma of the lung      History of Present Illness:  Patient is a 64 y.o. PMH of smoking, skin cancer, arthritis, GERD who presents for newly diagnosed lung cancer.    -2024 Pt went to Dr Archer for increased nonproductive coughing and feeling more dysnpeic on exertion. Was dx with pleurisy and given prednisone which helped somewhat.  -2024 Since cough hadn't completely gone away in 6 weeks, patient went back to Dr. Archer who became concerned and ordered a CXR and antibiotics.  -CXR showed a right hilar mass, CT was ordered.    -2024 CT chest at [facility]: Revealed a very large right hilar mass measuring 9.2 x 9.5 cm in diameter with hypodensity centrally suggesting a necrotic center.  There is obstruction of the right upper lobe bronchus with the infiltrate seen extending peripheral to that area and marked elevation of the right hilum.  Mass extends into the right hilar lymph nodes.    Patient was seen in consultation by Dr. Crystal Powell, pulmonology. She denied having a cough, hemoptysis, wheezing, dyspnea, fever, unintentional weight loss.    -2020 for EBUS bronchoscopy by Dr. Crystal Powell with FNA to the right hilar node 10R and to the right hilar mass revealing inés revealing malignant cells favoring non-small cell.    Pathology showed in the right hilar mass IHC positive for CK7, TTF-1, and Napsin, negative for p63 and CK/6 consistent with pulmonary adenocarcinoma negative for CD56, chromogranin, synaptophysin excluding neuroendocrine component.  Per discussion with pathology: Passes from the right hilar mass were found to be acellular, viable tissue was from the lymph node biopsied and 1 cassette is available for future testing.  -Pneumocystis was  negative,  respiratory culture showing rare growth of normal respiratory alexi with no S. aureus or Pseudomonas aeruginosa detected.,  Respiratory panel PCR negative, PRELIM: no acid-fast bacilli seen on concentrated smear at 1 week, No isolated fungus at 1 week,.  -8/30/2024 CBC showed WBC 11.53, hemoglobin 13.1, hematocrit 40.7 with essentially normal indices, platelets 332K    -9/12/2024 Patient presented for initial consultation with her niece reporting constant nonproductive cough, she still denies having hemoptysis, wheezing, dyspnea, fever, or unintentional weight loss, new bony pains, no new H/A or focal neuro deficit except right under arm new tingling/burning.  She has a history of smoking 1 PPD- started at 17, this summer cut down to 1/2 PPD mostly because of the coughing (47+ years).    -9/20/2024 MRI brain with without contrast with no acute intracranial process identified, findings suggestive of minimal chronic small vessel ischemic disease but no evidence of intracranial metastasis    -9/23/2025 Caris shows PD-L1 positive with TPS 80% by PDL1 test 22C3, with level 1 evidence for cemiplimab and pembrolizumab, positive by PD-L1 28-8 with level 1 evidence for nivolumab/ipilimumab combination, positive by PD-L1  with TC 1+, 50% with atezolizumab in the metastatic setting level 1 evidence, and PD-L1 positive and  positive TC 1+60% with a benefit of atezolizumab in the adjuvant setting, and cemiplimab level 1 evidence.  -ALK negative/fusion not detected by RNA in the tumor, BRAF mutation not detected, EGFR mutation not detected, MET variant transcript not detected, RET fusion not detected, ROS1 fusion not detected.  Tumor mutation burden was high at 18, microsatellite stable.  KRAS pG12V found and 61%, TP53 [id number] found in 56%, NFKBIA was amplified    -9/25/2024 NM PET/CT skull initial staging: Heterogeneous right upper lobe lung mass extending into the superior right lower lobe, encasing the  right mainstem bronchus, encasing and narrowing the right upper lobe bronchus, is hypermetabolic (mSUV 8.76), consistent with malignancy. It has heterogeneous areas of photopenia, suggesting internal necrosis. Pre-carinal lymph node measuring 14 mm short axis is FDG avid (mSUV 4.0), consistent with malignancy.  No hybrid avidity in the neck, abdomen, pelvis, or skeleton. No left hilar adenopathy. No suspicious left lung nodules. Normal heart size with coronary calcifications. Benign calcified mediastinal and left hilar lymph nodes are present.    -10/2/2024 f/u w/ niece reporting better nonproductive cough and rib pain since being oxycodone low dose. Got SIMMED yesterday for radiation. She has a history of smoking 1 PPD- started at 17, still on 1/2 PPD - gum stuck to dentures.    -10/3/2024 B12 1000 mcg given IM  -10/10 s/p Mediport placement    -10/14/2024 started chemoradiation (cisplatin +pemetrexed) reporting still with nonproductive cough and rib pain since being oxycodone low dose. Picked up her nausea medications, got her port,  her Emla cream (ports still a little tender), started her folate soon as she got it.  Did get her chemo teach and got her B12 shot as well she is ready to go.    -11/4/2024 Follow up for C2 chemoradiation reporting nonproductive cough and rib pain both better since being oxycodone low dose, using stool softener and yogurt which has greatly helped with stooling appropriately (EOD), patient had a lot of nausea the first 9 days, needing the zofran Q8H that time, a lot of fatigue.  -11/22/2024.  Patient completed radiation therapy to a total dose of 60 Gray (2 Gray per fraction with total of 30 fractions (under Dr. Ivan Dasilva).    -12/2/2024 Follow up for C3 chemotherapy, completed radiation last week.  She is reporting more productive cough (mostly clear and thick occ some green); reports when she is drinking warm liquids or cold liquids he is still having pain when she tries  to swallow the temperature seems to go down her throat where she chokes a little bit and then seems to radiate over to the upper right side of her chest this is relatively new.  Denies having any fevers, chills, pleuritic chest pain, shortness of breath in fact breathing seems to be easier.  She feels wiped out and rib pain better since being oxycodone low dose, using stool softener and yogurt which has greatly helped with stooling appropriately (EOD), patient still had a lot of nausea the first 9 days, even with addition of Zyprexa, needing the zofran Q8H that time, and admits that she is still taking the dexamethasone as prescribed day before, day of, day after and those are proximal to starting day of.    -Reviewed the report of the CT chest done at [facility] a few days ago shows some improvement of the size of her tumor and no concern of PNA or TE fistula.     -12/19/2024 Patient presents in follow up to ensure she will be ready for C4 chemotherapy on 12/31.  She is reporting the farther she gets from radiation, the more improvement she has in the productive cough (mostly clear and thick occ some green); reports also some improvement in the sensation of when she is drinking warm liquids or cold liquids not having as frequent or as severe pain when she tries to swallow the temperature seems to go down her throat, nor is she choking as much or radiating- over to the upper right side of her chest.  Denies having any fevers, chills, pleuritic chest pain, shortness of breath in fact breathing seems to be easier.  Her energy is a littler better, and rib pain better since completion of radiation;   -medicare finally filling the next oxycodone low dose Rx ordered by Dr Dasilva (also has Narcan)  -using stool softener and yogurt which has greatly helped with stooling appropriately (EOD)  -patient still had a lot of nausea the first 9 days, even with addition of Zyprexa (needs refill for C4), needing the zofran Q8H that  time,   -still taking the dexamethasone as prescribed day before, day of, day after and those are proximal to starting day of (needs refill for C4).    -1/16/2025 Got COVID H/A sore throat, body aches-all better except for back- in the middle, down towards the bottom (never ran fever) Her original productive cough-(mostly clear and thick occ some green); got worse with COVID- still worse, still not better. No shortness of breath. Because of this, has missed her 4th and last dose of chemo. Still not feeling well enough for in person visit today.    2/4/2025 1st imfinzi  -2/13/2025  NM bone scan:Increased uptake in the right third rib posteriorly corresponding to the area of tumor invasion locally in the right upper lobe. No evidence of distant metastatic disease.     -2/24/2025 CT C/A/P w/contrast: Visualized soft tissue of the lower neck demonstrate right-sided port catheter which is coiled within the internal jugular vein with a small amount of fibrin sheath or clot surrounding the catheter. The catheter tip terminates at the junction of the IJ vein and the right subclavian vein, stable prior. Precarinal lymph node decreased in size measuring 8 mm, previously 13 mm. No new mediastinal adenopathy. No pulmonary embolus. The thoracic aortic arch branch vasculature is patent. Within the RUL redemonstration of a mass extending to the right apex with size mildly decreased in the prior study with a representative measurement of 6.8 x 6.5 cm, previously 7.2 x 7.8 cm (4/45) with extension through the pleura to involve the right posterior central third rib as seen on the prior study with increased sclerosis at this site with no new rib involvement. Mass again extends to the right hilum with narrowing of the RUL bronchus and obstruction of the RUL bronchus posterior segment and extends across the major pulmonary fissure to involve the medial superior segment of the RLL. No new or enlarging nodule. Mild superior endplate  "fracture at T4 level unchanged. Mild superior endplate compression fracture at T2 new from the prior study. No retropulsed fracture fragment. Liver is mildly enlarged measuring 20 cm in craniocaudal length. No suspicious liver lesion. The spleen is normal in size. The adrenal glands are normal. Moderate amount of stool in the colon. Small fat-containing paraumbilical hernia. No inguinal adenopathy. No retroperitoneal or central mesenteric adenopathy. No aggressive osseous lesion or acute fracture.     -2/28/2025 MRI T spine done- Subacute appearing compression deformity of the superior endplate of T2. Chronic mild compression deformity of superior plate of T4. No bony retropulsion, subluxation, or canal compromise. Rounded enhancing lesion is seen within the T5 vertebral body left of midline measuring 1.3 x 1.1 cm (series 11 image 22, series 7 image 8), consistent with metastatic disease. Questionable T7 enhancing lesion posteriorly and left of midline measuring 1.2 x 1.2 cm on series 7 image 5, without confident axial correlate, suspicious for metastatic involvement.-findings suspicious for metastatic disease involving T2, T3, as well. 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    -3/4/2025 Pt reports for 2nd imfinzi, her prior COVID s/s (H/A, sore throat, body aches)-still resolved   -Back pain is continued-still different pain from what she had on presentation and still ongoing- in the middle, can radiate down towards the bottom, denies having any popping sensations or crunching feeling. Asking for more pain medication \"Dr Dasilva wouldn't refill it because you did last time.\" Dr Dasilva had scheduled an MRI of the back last week and referred for ?IR kyphoplasty +/- bone biopsy.  -Her original productive cough- (mostly clear and thick occ some green-unchanged); No shortness of breath unless it is a long cough.      -3/10/2025 PET/CT: Increased uptake in the right lobe of the thyroid (may represent a " "thyroid nodule, no nodule is identified on CT scan, mSUV 6). Redemonstration of RUL mass with cavitation with significant decrease in size and FDG activity compared to prior exam (mSUV 12.5), difficult to measure precisely, approximately 3.8 cm in diameter, decreased from prior (4.6 cm). Rim of mild FDG activity surrounding the cavitary portion, which is also decreased in size and activity compared to prior. There remains mild, persistent erosion into the right posterior third rib with mild residual FDG activity (mSUV 3.7). Pretracheal LN remains nonenlarged without FDG activity. No abnormal FDG activity identified to correspond to lesion seen on recent MRI; no abnormal CT appearance to correspond to the lesions on MRI. No osseous lesions in the pelvis. Mild compression deformities of T2 and T4 superior endplates.       -4/1/2025 Pt reports for 3rd imfinzi, her back pain is continued-In the upper middle and the middle, can radiate down towards the bottom and through her leg; still denies having any popping sensations or crunching feeling. Asking for more pain medication as has been out for 5 days; reports was taking as 7.5 mg Q8 H, was only lasting for 5-6 hr and bringing the sharp pain down less than 50%. Had uptitrated the gabapentin to 100mg AM, 200 PM with some relief in the pain and sciatica feeling but still having significant pain.  -Her original productive cough- (mostly clear and thick occ some green) relatively unchanged, still smoking 1/2 PPD \"so I don't ** somebody\"; No shortness of breath unless it is a long cough.      -4/8/2025 CT guided biopsy scheduled; concern the lesion is too high to safely do kyphoplasty.     Oncology Treatment  -10/14/2024-11/22/2024 started chemoradiation (cisplatin +pemetrexed)  completed )  -2/4/2025 adjuvant Imfinzi-ONGOING  -5/2025 Xgeva-    Monitoring Parameters  -Physical examinations  -laboratory testing  -CT scans and PET scans       Subjective:  -4/8/2025 CT guided " "biopsy scheduled; concern the lesion is too high to safely do kyphoplasty -HOWEVER: No visible bony lesion detected on CT so biopsy and kyphoplasty canceled.     -2025 Pt reports for 4th imfinzi, her back pain is continued starting to get more control with current narcotic prescription through Dr. Dasilva (10 mg 3 times daily with 15 mg prior to bed) as well as the gabapentin that she is using is 300 mg every 8 hours. Still smoking 1/2 PPD \"so I don't ** somebody\"; No shortness of breath unless it is a long cough.    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 2025       Past Surgical History:   Procedure Laterality Date    BRONCHOSCOPY N/A 2024    Procedure: BRONCHOSCOPY WITH BRONCHIAL WASHING ,ENDOBRONCHIAL ULTRASOUND WITH FINE NEEDLE ASPIRATION X2 AREAS;  Surgeon: Crystal Powell MD;  Location: [facility] ENDOSCOPY;  Service: Pulmonary;  Laterality: N/A;     SECTION      x3    KNEE SURGERY Left     ORIF, screws and plates, has had Operation on it x 3         Current Outpatient Medications:     albuterol sulfate  (90 Base) MCG/ACT inhaler, Inhale 1 puff Every 4 (Four) Hours As Needed for Wheezing., Disp: , Rfl:     Breztri Aerosphere 160-9-4.8 MCG/ACT aerosol inhaler, Inhale 2 puffs 2 (Two) Times a Day., Disp: , Rfl:     famotidine (PEPCID) 20 MG tablet, Take 1 tablet by mouth 2 (Two) Times a Day., Disp: 30 tablet, Rfl: 2    gabapentin (NEURONTIN) 300 MG capsule, Take 1 capsule by mouth 4 (Four) Times a Day. As instructed: 1 capsule in AM, 1 capsule in afternoon, 2 capsules 1 hour before bed, Disp: 120 capsule, Rfl: 3    Glucose Blood (Blood Glucose Test Strips 333) strip, Check sugar twice a day and PRN and keep a log In Vitro, Disp: , Rfl:     lidocaine-prilocaine (EMLA) 2.5-2.5 % cream, Apply 1 Application topically to the appropriate area as " directed Every 2 (Two) Hours As Needed for Mild Pain. To port site before chemo/blood draw, Disp: 30 g, Rfl: 2    metFORMIN (GLUCOPHAGE) 1000 MG tablet, Every 12 (Twelve) Hours., Disp: , Rfl:     naloxone (NARCAN) 4 MG/0.1ML nasal spray, Call 911. Don't prime. Lulu in 1 nostril for overdose. Repeat in 2-3 minutes in other nostril if no or minimal breathing/responsiveness., Disp: 2 each, Rfl: 0    ondansetron ODT (ZOFRAN-ODT) 8 MG disintegrating tablet, Take 1 tablet by mouth Every 8 (Eight) Hours As Needed for Nausea or Vomiting., Disp: 45 tablet, Rfl: 3    oxyCODONE (OXY-IR) 5 MG capsule, Take 2 capsules by mouth Every 6 (Six) Hours As Needed for Moderate Pain or Severe Pain for up to 30 days. For neoplasm pain; titrate down as tolerated., Disp: 240 capsule, Rfl: 0    prochlorperazine (COMPAZINE) 10 MG tablet, Take 1 tablet by mouth Every 6 (Six) Hours As Needed for Nausea or Vomiting., Disp: 60 tablet, Rfl: 1    sennosides-docusate (senna-docusate sodium) 8.6-50 MG per tablet, Take 2 tablets by mouth Daily., Disp: 60 tablet, Rfl: 3  No current facility-administered medications for this visit.    Facility-Administered Medications Ordered in Other Visits:     heparin injection 500 Units, 500 Units, Intravenous, PRN, Veronica Huynh MD PhD, 500 Units at 04/29/25 1530    sodium chloride 0.9 % flush 10 mL, 10 mL, Intravenous, PRN, Veronica Huynh MD PhD, 10 mL at 04/29/25 1530    sodium chloride 0.9 % infusion, 20 mL/hr, Intravenous, Once, Veronica Huynh MD PhD    No Known Allergies    Family History   Problem Relation Age of Onset    Lung cancer Brother         Lung cancer/Leukemia    Cancer Brother        Cancer-related family history includes Cancer in her brother; Lung cancer in her brother.    Social History     Tobacco Use    Smoking status: Every Day     Current packs/day: 0.50     Average packs/day: 0.5 packs/day for 48.3 years (24.2 ttl pk-yrs)     Types: Cigarettes     Start date:  1977    Smokeless tobacco: Never   Vaping Use    Vaping status: Never Used   Substance Use Topics    Alcohol use: Not Currently    Drug use: Never     Social History     Social History Narrative    Not on file      Review of Systems:  Constitutional: +fatigue (slightly improved), Denies any weakness, lack of appetite, excessive appetite, weight change, chills or fever.  Eyes: Reports no blurry vision, no double vision, no pain in the eyes, dry eyes or tearing.  ENT: Reports no decreased hearing capacity, ear pain or tinnitus. Denies any epistaxis, nasal congestion, mouth sores or bleeding, tooth or jaw pain, sore throat or hoarseness.  Respiratory: + chronic cough (better with oxy)  Denies any sputum production or hemoptysis. There is no wheezing, shortness of breath or any other respiratory complaints.  Cardiovascular: + shortness of breath with activity (slightly improved), Denies any chest pain, shortness of breath when lying down, palpitations, or leg swelling.  Breasts: Denies any lumps, bumps, pain, nipple changes or discharge in the breasts.  Gastrointestinal: painful swallowing (resolved), nausea (resolved), vomiting (resolved), constipation (better per HPI) There are no complaints of abdominal pain, difficulty swallowing or anorexia, diarrhea,  heartburn, blood in the stools, dark stools, or change in bowel habits or hemorrhoids.  Genitourinary: Denies any pain or burning on urination, blood in the urine or frequent urination.   Musculoskeletal: +upper and mid back pain (continued and slightly improved per HPI), Denies painful joints, no swelling of the joints, muscle pain. Denies any pain or tingling in the legs, hands or feet.  Neuropsychiatric: Denies any nervousness, depressed mood, headaches, blackouts, dizziness, weakness of limbs, loss of sensation, loss of balance, loss of coordination or difficulty in speaking.  Cutaneous: Denies any dry skin, itching, rash, change in the color of the skin, or any  "other cutaneous complaints.  Hematologic/Lymphatic: Denies any bruising or bleeding. Report no recent development of palpable or painful lymph nodes.  Allergy/Immunology: Denies rash/hayfever symptoms or any recent history of pneumonia, recurrent sinusitis, urinary infection or any other history of an ongoing infection.  Endocrine: Reports no intolerance to heat or cold, excessive thirst or excessive urination.    Objective:  Vital signs:  Vitals:    25 1302   BP: 116/68   Pulse: 110   Temp: 98.3 °F (36.8 °C)   TempSrc: Oral   SpO2: 99%   Weight: 73.8 kg (162 lb 9.6 oz)   Height: 172.7 cm (67.99\")   PainSc: 6    PainLoc: Back  Comment: middle     Body mass index is 24.73 kg/m².      Physical Exam:   ECO  GENERAL: The patient is a pleasant middle aged white female who appears their stated age and is awake, in no acute distress, alert and oriented x3.  SKIN: No rash or ulcers.  HEME/LYMPHATICS: No bruising or petechiae on visual inspection. No lymphadenopathy on visual inspection and palpation of cervical, supraclavicular, infraclavicular lymph nodes.  HEAD/FACE: atraumatic and normocephalic. Normal hair.  EYES: PERRLA/EOMI. No scleral icterus. No tearing or dry eyes.  ENT: External ears normal with no evidence of discharge. No evidence of ulceration or bleeding in the nostrils. Mouth has no ulcers, bleeding or inflammation of the gums, floor or roof of the mouth with normal dentition.  NECK: Supple, symmetric.   CHEST/RESPIRATORY: Expansion maintained bilaterally and symmetrically. On auscultation, clear breath sounds in left lung, decreased breath sounds in upper right lung field (unchanged). No wheezes, rhonchi or rales.  BREAST: Deferred.  CARDIOVASCULAR: On auscultation, regular rate and rhythm. No murmurs, gallops or rubs are heard.  GASTROINTESTINAL/ABDOMEN: Symmetric. On auscultation of the abdomen, there are normal bowel sounds in all four quadrants. There are no surgical scars in the abdomen. " "Nontender to palpation.  GENITOURINARY: Deferred.  NEUROLOGIC: Alert and oriented time, person, and place, with no apparent changes in recent or remote memory. Cranial nerves II-XII are grossly intact. Sensation is maintained in the extremities. Strength and tone appear normal for age and equal in the extremities. Gait is normal.  MUSCULOSKELETAL: + TTP on midback for most of thoracic spine and paraspinous mm. No cyanosis, clubbing or edema in the extremities. There are no surgical scars on the extremities.  PSYCHIATRIC: The patient maintains judgment and has good insight. The patient has no changes in mood or affection.  IV: right chest Mediport accessed, NTTP, no erythema or edema..    Lab Results - Last 18 Months   Lab Units 04/29/25  1306 04/08/25  0822 04/01/25  1325   WBC 10*3/mm3 6.03 8.22 6.61   HEMOGLOBIN g/dL 13.0 12.9 12.5   HEMATOCRIT % 40.3 40.0 38.7   PLATELETS 10*3/mm3 307 284 292   MCV fL 90.6 88.5 90.4     Lab Results - Last 18 Months   Lab Units 04/29/25  1306 04/01/25  1325 03/04/25  1259   SODIUM mmol/L 136 137 136   POTASSIUM mmol/L 4.2 4.2 4.0   CHLORIDE mmol/L 100 102 100   CO2 mmol/L 25.0 24.5 25.6   BUN mg/dL 18 11 12   CREATININE mg/dL 1.17* 0.85 0.82   CALCIUM mg/dL 9.9 9.9 10.0   BILIRUBIN mg/dL 0.2 <0.2 0.2   ALK PHOS U/L 102 90 87   ALT (SGPT) U/L 15 11 17   AST (SGOT) U/L 15 12 15   GLUCOSE mg/dL 127* 107* 129*       Lab Results   Component Value Date    GLUCOSE 127 (H) 04/29/2025    BUN 18 04/29/2025    CREATININE 1.17 (H) 04/29/2025    BCR 15.4 04/29/2025    K 4.2 04/29/2025    CO2 25.0 04/29/2025    CALCIUM 9.9 04/29/2025    ALBUMIN 4.1 04/29/2025    AST 15 04/29/2025    ALT 15 04/29/2025       Lab Results - Last 18 Months   Lab Units 04/08/25  0822 10/10/24  0810 08/26/24  0945   INR  0.92 <0.93* 0.93   APTT seconds 30.4 30.8 27.1       No results found for: \"FIBRINOGEN\", \"HAPTOGLOBIN\"  Lab Results   Component Value Date    PTT 30.4 04/08/2025    INR 0.92 04/08/2025       No " "results found for: \"SEDRATE\"    Assessment & Plan     65 y.o. female with PMH of smoking, skin cancer, arthritis, GERD who presented 8/2024  for newly diagnosed lung cancer, s/p definitive chemoradiation (completed 11/22/2024), currently on immunotherapy.    Right hilar mass/right upper lobe adenocarcinoma, at minimum T4 N2 (IIIB); 2/28/2025 with pain and imaging concerning for progression to stage IV (bone)    Previously discussed the above results which include imaging and pathology with the patient.  -Staging PET/CT no metastatic disease but confirmed IIIB disease, nonsurgical candidate-previously discussed this in detail with the patient and her family and explained that the standard of care at this point would be chemoradiation with curative intent.  Discussed the couple chemotherapy regimens that can be used for radiation.  -Staging brain MRI negative for metastasis    -Radiology Oncology on board: pt completed her concurrent chemoradiation -11/22/2024  -PFTs from pulmonologist done per patient- per patient 50-60%    -Biomarker testing +PD-L1 testing (category 1), +KRAS G12C, negative EGFR mutation including exon 19 del, L858R and other deletions, exon 20 insertions, (category 1), ALK fusions (category 1), ROS1 fusions, BRAF V600E, NTRK 1/2/3 fusions, MET exon 14 skipping or amplifications, RET fusions, ErbB2 (HER2) alterations,    -10/10/24 s/p Mediport placement with EMLA cream available  Didn't get cycle 4 chemotherapy on 12/31/2024 given COVID    -12/18/2024 PRIORITY CT chest scan already showing improvement in lung cancer size with no signs of progression near end of radiation completion showing improvement in lung cancer. Cannot do repeat imaging at this time given recent COVID infection (has been known to make lung nodules on imaging)  -1/16/25 Discussed durvalumab maintenance therapy every 2-4 weeks for one year per NCCN guidelines and FDA approval. We discussed that from the [place] trial data, in " stage III NSCLC nonresectable patients that receive durvalumab post definitive chemoradiation vs. those that did not, have a 48% reduction in risk of progression. 5 year post hoc analysis showed median overall survival with durvalumab was 47.5 months vs. 29.1 months with placebo. HR 0.72. We previously discussed that durvalumab is a PD-L1 inhibitor and classified as immunotherapy. Patient was provided a [facility] information sheet on Durvalumab. We discussed side effects including but not limited to immune mediated effects such as pneumonitis, colitis and hepatitis as well as nausea, diarrhea, fatigue, electrolyte abnormalities and infusion reactions.    PLAN: cisplatin plus pemetrexed Q21 days X 3 cycles with concurrent radiation with repeat imaging showing improvement no progression (completed), followed by durvalumab 1500 mg Q4 weeks x 12 cycles (category 1 for stage III)-ONGOING     -2/24/2024 CT C/A/P w/contrast: Right-sided port catheter coiled within the IJ vein with a small amount of fibrin sheath or clot surrounding the catheter. The catheter tip terminates at the junction of the IJ vein and the right subclavian vein, stable prior. Precarinal LN decreased in size measuring 8 mm, previously 13 mm. No new mediastinal adenopathy. RUL redemonstration of a mass extending to the right apex with size mildly decreased in the prior study with a representative measurement of 6.8 x 6.5 cm, previously 7.2 x 7.8 cm with extension through the pleura to involve the right posterior central third rib as seen on the prior study with increased sclerosis at this site with no new rib involvement. Mass again extends to the right hilum with narrowing of the RUL bronchus and obstruction of the RUL bronchus posterior segment and extends across the major pulmonary fissure to involve the medial superior segment of the RLL. No new or enlarging nodule. Mild superior endplate fracture at T4 level unchanged. Mild superior endplate  compression fracture at T2 new from the prior study. No retropulsed fracture fragment. Liver is mildly enlarged measuring 20 cm in craniocaudal length. No aggressive osseous lesion or acute fracture.    -precert approved 1/17/25, given labs within acceptable parameters and patient H&P within acceptable parameters, proceed with C4 today and RTC in 4 weeks with labs for C5D1  -4/29/2025 discussed possibly adding bisphosphonate/denosumab -unable to get biopsy as CT no longer showing a spot in the bone to biopsy; will not need dental clearance (has full dentures).  Patient okay with proceeding particularly since it may help with pain if she does not fact have cancer in the bone.  Xgeva monthly ordered today for prior Auth with plans to start on cycle 5 of immunotherapy.  -*Data has shown a Palliative consult to help manage symptoms early on in care for advanced stage lung cancer patients-pt had been referred to [facility] by [facility] but unfortunately not in pt's network.  -6/23/2025 CT chest abdomen and pelvis scheduled; 6/26/2025 has follow-up with Dr. Dasilva to review      2. Worsening mid-level back pain, sub-acute  -2/4/2025 patient reporting not the same kind of pain she had on presentation (with the local invasion of the lung cancer into local tissues), getting more consistent aching pain. Was getting Светлана 7.5 liquid and using 1-2 times a day, occ up to 3X/day.    -3/4/2025: Re-ordered Светлана 7.5 mg (1.5 tablets) every 8 hours as needed for pain, 14-day supply.  -Patient already knows about bowel regimens do not get constipated.  -2/13/2025 NM bone scan: Increased uptake in the right third rib posteriorly corresponding to the area of tumor invasion locally in the right upper lobe. No evidence of distant metastatic disease.   -2/24/25 CT C/A/P as in #1: New mild superior endplate compression fracture at T2;   -2/28/2025 MRI T spine done- Subacute appearing compression deformity of the superior endplate of T2. Chronic  mild compression deformity of superior plate of T4. No bony retropulsion, subluxation, or canal compromise. Rounded enhancing lesion is seen within the T5 vertebral body left of midline measuring 1.3 x 1.1 cm (series 11 image 22, series 7 image 8), consistent with metastatic disease. Questionable T7 enhancing lesion posteriorly and left of midline measuring 1.2 x 1.2 cm on series 7 image 5, without confident axial correlate, suspicious for metastatic involvement.-findings suspicious for metastatic disease involving T2, T3, as well. 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  -3/10/2025 PET/CT: Increased uptake in the right lobe of the thyroid (may represent a thyroid nodule, no nodule is identified on CT scan, mSUV 6). Redemonstration of RUL mass with cavitation. Significant decrease in size and FDG activity compared to the prior exam (mSUV 12.5), difficult to measure precisely, approximately 3.8 cm in diameter, decreased from prior (4.6 cm). Rim of mild FDG activity surrounding the cavitary portion, which is also decreased in size and activity compared to prior. There remains mild, persistent erosion into the right posterior third rib with mild residual FDG activity (mSUV 3.7). No extension into the chest wall or new pulmonary nodules. No abnormal FDG activity identified to correspond to lesion seen on recent MRI; no abnormal CT appearance to correspond to the lesions on MRI. No osseous lesions in the pelvis. Mild compression deformities of T2 and T4 superior endplates.  -4/1/2025: Re-ordered Светлана 10 mg every 6-8 hours as needed for pain, 30-day supply; also switching from gabapentin 100 AM and 200 PM to titrating up gabapentin 300 TID as discussed.  -4/29/25: Dr. Dasilva ordering for oxycodone, reordered gabapentin 300 mg in the morning, afternoon, with 600 mg 1 hour prior to bed for better control of pain while she is trying to sleep with refills.  Also ordered Xgeva as above  -continue to  monitor      3. Tobacco dependence  -tried nicotine gum but stuck to dentures, is 1/2 ppd (but only 1/2 a cigarette when she smokes);pt does not think she will be able to stop smoking      4.  Hyperglycemia, noted after start of treatment to 300s; improved after less dexamethasone (when chemo ended)  -Patient started on 1000 mg metformin daily by PCP, patient has had metformin changed and is currently taking at 1000 mg twice daily  -Continue to monitor;       5. Elevated TSH C3 immunotherapy  -4/1/2025 may be immunotherapy related; T4 normal  - 4/29/2025 TSH now up to 15 with T4 still within normal limits at 0.93  -will continue to monitor; discussed with patient at some point may need to start levothyroxine but we will continue to monitor until then      Thank you very much for providing the opportunity to participate in this patient’s care. Please do not hesitate to call if there are any other questions.

## 2025-04-11 NOTE — PROGRESS NOTES
[facility] RADIATION ONCOLOGY  FOLLOW-UP    Name: Laura Higginbotham  YOB: 1960  MRN #: [id number]  Date of Service: 4/14/2025    Chief Complaint:  Follow up after imaging     Diagnosis:   Encounter Diagnosis   Name Primary?    Malignant neoplasm of upper lobe of right lung Yes          Radiation Treatment Course       Treating Physician: Dr. Ivan Dasilva     Start: 10/14/2024     End: 11/22/2024   Site: Right Upper lobe    Total Dose: 6000 cGy    Dose/Fraction: 200 cGy    Total Fractions: 30 Daily or BID: Daily  Modality: Photon  Technique: IMRT/VMAT/Rapid Arc  Bolus: No         Interval History       Laura Higginbotham is a 65 y.o. female  female who was diagnosed with stage IIIb (cT4 cN2 cM0) right upper lobe lung adenocarcinoma in August 2024.  She completed definitive chemoradiation with 3 cycles cisplatin/pemetrexed and 6000 cGy in 30 fractions on 12/2/2024.  Immunotherapy with durvalumab was initiated on 2/4/2025.  She developed worsening mid back pain in February 2025.  CT CAP showed a new mild superior endplate compression fracture at T2 and unchanged mild T4 compression fracture. Laura presents today to review recent imaging and discuss further treatment plans.       4/8/2025 patient was scheduled for CT-guided needle biopsy for concern of bone metastases.  They also discussed potential kyphoplasty.  No visible bony lesion was detected on CT so the biopsy was canceled.  This also meant the kyphoplasty was canceled.    In the postprocedure notes, surveillance with imaging was recommended.    The patient presents now with persistent pain.  She has benefited some from gabapentin in addition to her oxycodone pain medicine.      Imaging     NM PET/CT Skull Base to Mid Thigh  Result Date: 3/14/2025  Impression: 1. Interval treatment response with continued decrease in size and activity of right upper lobe mass with cavitation, as well as extension into the right posterior third rib 2. The  lesion seen on recent MRI do not have a CT, or PET correlate, but do remain suspicious for metastatic disease 3. Asymmetric focal uptake within the right lobe of the thyroid, without correlate on CT. Finding is nonspecific, consider correlation with thyroid ultrasound to exclude nodule.     Pathology       No new pathology to review.    Labs       Hematology:  WBC   Date Value Ref Range Status   04/08/2025 8.22 3.40 - 10.80 10*3/mm3 Final     RBC   Date Value Ref Range Status   04/08/2025 4.52 3.77 - 5.28 10*6/mm3 Final     Hemoglobin   Date Value Ref Range Status   04/08/2025 12.9 12.0 - 15.9 g/dL Final     Hematocrit   Date Value Ref Range Status   04/08/2025 40.0 34.0 - 46.6 % Final     Platelets   Date Value Ref Range Status   04/08/2025 284 140 - 450 10*3/mm3 Final     Chemistry:  Lab Results   Component Value Date    GLUCOSE 107 (H) 04/01/2025    BUN 11 04/01/2025    CREATININE 0.85 04/01/2025    BCR 12.9 04/01/2025    K 4.2 04/01/2025    CO2 24.5 04/01/2025    CALCIUM 9.9 04/01/2025    ALBUMIN 4.0 04/01/2025    AST 12 04/01/2025    ALT 11 04/01/2025         Problem List       Patient Active Problem List   Diagnosis    Malignant neoplasm of upper lobe of right lung    Moderate major depression, single episode    Organic anxiety disorder    Polyneuropathy    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    Chemotherapy-induced nausea    Choking    Encounter for therapeutic drug monitoring    Encounter for immunotherapy    Encounter for long-term (current) use of high-risk medication    Acute bilateral thoracic back pain    Primary adenocarcinoma of upper lobe of right lung    Neoplasm related pain          Current Medications       Current Outpatient Medications   Medication Instructions    albuterol sulfate  (90 Base) MCG/ACT inhaler 1 puff, Every 4 Hours PRN    Breztri Aerosphere 160-9-4.8 MCG/ACT aerosol inhaler 2 puffs, 2 Times Daily    famotidine (PEPCID) 20 mg, Oral, 2 Times  Daily    gabapentin (NEURONTIN) 300 mg, Oral, 3 Times Daily    Glucose Blood (Blood Glucose Test Strips 333) strip Check sugar twice a day and PRN and keep a log In Vitro    lidocaine-prilocaine (EMLA) 2.5-2.5 % cream 1 Application, Topical, Every 2 Hours PRN, To port site before chemo/blood draw    metFORMIN (GLUCOPHAGE) 1000 MG tablet Every 12 Hours Scheduled    naloxone (NARCAN) 4 MG/0.1ML nasal spray Call 911. Don't prime. [place] in 1 nostril for overdose. Repeat in 2-3 minutes in other nostril if no or minimal breathing/responsiveness.    ondansetron ODT (ZOFRAN-ODT) 8 mg, Oral, Every 8 Hours PRN    oxyCODONE (OXY-IR) 10 mg, Oral, Every 6 Hours PRN, For neoplasm pain; titrate down as tolerated.    prochlorperazine (COMPAZINE) 10 mg, Oral, Every 6 Hours PRN    sennosides-docusate (senna-docusate sodium) 8.6-50 MG per tablet 2 tablets, Oral, Daily          Allergies       No Known Allergies        Review of Systems         Vitals:    04/14/25 1008   BP: 122/79   Pulse: 95   Resp: 18   SpO2: 99%      Physical Exam  Constitutional:       General: She is not in acute distress.  HENT:      Head: Normocephalic and atraumatic.   Pulmonary:      Effort: Pulmonary effort is normal. No respiratory distress.   Neurological:      Mental Status: She is alert and oriented to person, place, and time. Mental status is at baseline.   Psychiatric:         Mood and Affect: Mood normal.         Behavior: Behavior normal.          ECOG:  Restricted in physically strenuous activity but ambulatory and able to carry out work of a light or sedentary nature, e.g., light house work, office work = 1          Assessment and Plan       Assessment:        Laura Higginbotham is a 65 y.o. female  female who was diagnosed with stage IIIb (cT4 cN2 cM0) right upper lobe lung adenocarcinoma in August 2024.  She completed definitive chemoradiation with 3 cycles cisplatin/pemetrexed and 6000 cGy in 30 fractions on 12/2/2024.  Immunotherapy with durvalumab was  initiated on 2/4/2025.  She developed worsening mid back pain in February 2025.  CT CAP showed a new mild superior endplate compression fracture at T2 and unchanged mild T4 compression fracture. Laura presents today to review recent imaging and discuss further treatment plans.         Imaging       ASSESSMENT:    Laura Higginbotham is a 65 y.o. female  female who was diagnosed with stage IIIb (cT4 cN2 cM0) right upper lobe lung adenocarcinoma in August 2024.  She completed definitive chemoradiation with 3 cycles cisplatin/pemetrexed and 6000 cGy in 30 fractions on 12/2/2024.  Immunotherapy with durvalumab was initiated on 2/4/2025.  She developed worsening mid back pain in February 2025.  CT CAP showed a new mild superior endplate compression fracture at T2 and unchanged mild T4 compression fracture. Laura presents today to review recent imaging and discuss further treatment plans.     Diagnoses and all orders for this visit:    1. Malignant neoplasm of upper lobe of right lung (Primary)       Plan:      Orders:  - I will take over pain prescription (likely 10 mg 4 times a day at next prescription). Patient not due for refill for a few more weeks.   - Continue taper up of Gabapentin. Currently taking 300 mg twice daily  - Repeat imaging in June for surveillance (MRI question to Dr. Silva)    We reviewed the patient's recent attempted biopsy. There was not obvious evidence of metastasis so biopsy was aborted. The patient still is complaining about back pain. She has started taking gabapentin which is helping her sleep at night. She recently increased her dose of oxycodone which helps a little. We discussed plans for repeat imaging in 3 months to monitor her disease. I reached out to Dr. Silva about repeating an MRI of her spine and am waiting to hear back. We will at least plan for for CT CAP. Patient is encouraged to reach out with questions or concerns prior to her next appointment.     I spent 30 minutes caring for Laura  on this date of service. This time includes time spent by me in the following activities:preparing for the visit, reviewing tests, obtaining and/or reviewing a separately obtained history, performing a medically appropriate examination and/or evaluation , counseling and educating the patient/family/caregiver, ordering medications, tests, or procedures, documenting information in the medical record, and independently interpreting results and communicating that information with the patient/family/caregiver        Follow-Up       No follow-ups on file.       CC: Laura Archer

## 2025-04-14 ENCOUNTER — OFFICE VISIT (OUTPATIENT)
Dept: RADIATION ONCOLOGY | Facility: HOSPITAL | Age: 65
End: 2025-04-14
Payer: MEDICARE

## 2025-04-14 VITALS
DIASTOLIC BLOOD PRESSURE: 79 MMHG | BODY MASS INDEX: 24.63 KG/M2 | SYSTOLIC BLOOD PRESSURE: 122 MMHG | OXYGEN SATURATION: 99 % | HEART RATE: 95 BPM | RESPIRATION RATE: 18 BRPM | WEIGHT: 162 LBS

## 2025-04-14 DIAGNOSIS — C34.11 MALIGNANT NEOPLASM OF UPPER LOBE OF RIGHT LUNG: Primary | ICD-10-CM

## 2025-04-14 PROCEDURE — G0463 HOSPITAL OUTPT CLINIC VISIT: HCPCS | Performed by: INTERNAL MEDICINE

## 2025-04-29 ENCOUNTER — OFFICE VISIT (OUTPATIENT)
Dept: ONCOLOGY | Facility: CLINIC | Age: 65
End: 2025-04-29
Payer: MEDICARE

## 2025-04-29 ENCOUNTER — HOSPITAL ENCOUNTER (OUTPATIENT)
Dept: ONCOLOGY | Facility: HOSPITAL | Age: 65
Discharge: HOME | End: 2025-04-29
Payer: MEDICARE

## 2025-04-29 VITALS
TEMPERATURE: 98.3 F | SYSTOLIC BLOOD PRESSURE: 116 MMHG | OXYGEN SATURATION: 99 % | BODY MASS INDEX: 24.64 KG/M2 | HEART RATE: 110 BPM | DIASTOLIC BLOOD PRESSURE: 68 MMHG | WEIGHT: 162.6 LBS | HEIGHT: 68 IN

## 2025-04-29 DIAGNOSIS — C34.90 NON-SMALL CELL LUNG CANCER METASTATIC TO BONE: ICD-10-CM

## 2025-04-29 DIAGNOSIS — C34.90 NON-SMALL CELL LUNG CANCER METASTATIC TO INTRATHORACIC LYMPH NODE: ICD-10-CM

## 2025-04-29 DIAGNOSIS — C34.11 MALIGNANT NEOPLASM OF UPPER LOBE OF RIGHT LUNG: ICD-10-CM

## 2025-04-29 DIAGNOSIS — C34.11 MALIGNANT NEOPLASM OF UPPER LOBE OF RIGHT LUNG: Primary | ICD-10-CM

## 2025-04-29 DIAGNOSIS — C79.51 NON-SMALL CELL LUNG CANCER METASTATIC TO BONE: ICD-10-CM

## 2025-04-29 DIAGNOSIS — Z51.81 ENCOUNTER FOR THERAPEUTIC DRUG MONITORING: ICD-10-CM

## 2025-04-29 DIAGNOSIS — C77.1 NON-SMALL CELL LUNG CANCER METASTATIC TO INTRATHORACIC LYMPH NODE: ICD-10-CM

## 2025-04-29 DIAGNOSIS — C77.1 NON-SMALL CELL LUNG CANCER METASTATIC TO INTRATHORACIC LYMPH NODE: Primary | ICD-10-CM

## 2025-04-29 DIAGNOSIS — C34.90 NON-SMALL CELL LUNG CANCER METASTATIC TO INTRATHORACIC LYMPH NODE: Primary | ICD-10-CM

## 2025-04-29 DIAGNOSIS — C34.11 PRIMARY ADENOCARCINOMA OF UPPER LOBE OF RIGHT LUNG: ICD-10-CM

## 2025-04-29 DIAGNOSIS — Z29.89 ENCOUNTER FOR IMMUNOTHERAPY: ICD-10-CM

## 2025-04-29 DIAGNOSIS — Z79.899 ENCOUNTER FOR LONG-TERM (CURRENT) USE OF HIGH-RISK MEDICATION: ICD-10-CM

## 2025-04-29 DIAGNOSIS — G89.3 NEOPLASM RELATED PAIN: ICD-10-CM

## 2025-04-29 LAB
ALBUMIN SERPL-MCNC: 4.1 G/DL (ref 3.5–5.2)
ALBUMIN/GLOB SERPL: 1.2 G/DL
ALP SERPL-CCNC: 102 U/L (ref 39–117)
ALT SERPL W P-5'-P-CCNC: 15 U/L (ref 1–33)
ANION GAP SERPL CALCULATED.3IONS-SCNC: 11 MMOL/L (ref 5–15)
AST SERPL-CCNC: 15 U/L (ref 1–32)
BASOPHILS # BLD AUTO: 0.05 10*3/MM3 (ref 0–0.2)
BASOPHILS NFR BLD AUTO: 0.8 % (ref 0–1.5)
BILIRUB SERPL-MCNC: 0.2 MG/DL (ref 0–1.2)
BUN SERPL-MCNC: 18 MG/DL (ref 8–23)
BUN/CREAT SERPL: 15.4 (ref 7–25)
CALCIUM SPEC-SCNC: 9.9 MG/DL (ref 8.6–10.5)
CHLORIDE SERPL-SCNC: 100 MMOL/L (ref 98–107)
CO2 SERPL-SCNC: 25 MMOL/L (ref 22–29)
CREAT SERPL-MCNC: 1.17 MG/DL (ref 0.57–1)
DEPRECATED RDW RBC AUTO: 45 FL (ref 37–54)
EGFRCR SERPLBLD CKD-EPI 2021: 51.9 ML/MIN/1.73
EOSINOPHIL # BLD AUTO: 0.08 10*3/MM3 (ref 0–0.4)
EOSINOPHIL NFR BLD AUTO: 1.3 % (ref 0.3–6.2)
ERYTHROCYTE [DISTWIDTH] IN BLOOD BY AUTOMATED COUNT: 13.9 % (ref 12.3–15.4)
GLOBULIN UR ELPH-MCNC: 3.4 GM/DL
GLUCOSE SERPL-MCNC: 127 MG/DL (ref 65–99)
HCT VFR BLD AUTO: 40.3 % (ref 34–46.6)
HGB BLD-MCNC: 13 G/DL (ref 12–15.9)
LYMPHOCYTES # BLD AUTO: 1.17 10*3/MM3 (ref 0.7–3.1)
LYMPHOCYTES NFR BLD AUTO: 19.4 % (ref 19.6–45.3)
MCH RBC QN AUTO: 29.2 PG (ref 26.6–33)
MCHC RBC AUTO-ENTMCNC: 32.3 G/DL (ref 31.5–35.7)
MCV RBC AUTO: 90.6 FL (ref 79–97)
MONOCYTES # BLD AUTO: 0.5 10*3/MM3 (ref 0.1–0.9)
MONOCYTES NFR BLD AUTO: 8.3 % (ref 5–12)
NEUTROPHILS NFR BLD AUTO: 4.23 10*3/MM3 (ref 1.7–7)
NEUTROPHILS NFR BLD AUTO: 70.2 % (ref 42.7–76)
PLATELET # BLD AUTO: 307 10*3/MM3 (ref 140–450)
PMV BLD AUTO: 8.2 FL (ref 6–12)
POTASSIUM SERPL-SCNC: 4.2 MMOL/L (ref 3.5–5.2)
PROT SERPL-MCNC: 7.5 G/DL (ref 6–8.5)
RBC # BLD AUTO: 4.45 10*6/MM3 (ref 3.77–5.28)
SODIUM SERPL-SCNC: 136 MMOL/L (ref 136–145)
T4 FREE SERPL-MCNC: 0.93 NG/DL (ref 0.93–1.7)
TSH SERPL DL<=0.05 MIU/L-ACNC: 15 UIU/ML (ref 0.27–4.2)
WBC NRBC COR # BLD AUTO: 6.03 10*3/MM3 (ref 3.4–10.8)

## 2025-04-29 PROCEDURE — 25810000003 SODIUM CHLORIDE 0.9 % SOLUTION 250 ML FLEX CONT: Performed by: INTERNAL MEDICINE

## 2025-04-29 PROCEDURE — 84439 ASSAY OF FREE THYROXINE: CPT | Performed by: INTERNAL MEDICINE

## 2025-04-29 PROCEDURE — 85025 COMPLETE CBC W/AUTO DIFF WBC: CPT | Performed by: INTERNAL MEDICINE

## 2025-04-29 PROCEDURE — 84443 ASSAY THYROID STIM HORMONE: CPT | Performed by: INTERNAL MEDICINE

## 2025-04-29 PROCEDURE — 96413 CHEMO IV INFUSION 1 HR: CPT

## 2025-04-29 PROCEDURE — 25010000002 DURVALUMAB 50 MG/ML SOLUTION 10 ML VIAL: Performed by: INTERNAL MEDICINE

## 2025-04-29 PROCEDURE — 80053 COMPREHEN METABOLIC PANEL: CPT | Performed by: INTERNAL MEDICINE

## 2025-04-29 PROCEDURE — 1125F AMNT PAIN NOTED PAIN PRSNT: CPT | Performed by: INTERNAL MEDICINE

## 2025-04-29 PROCEDURE — 99214 OFFICE O/P EST MOD 30 MIN: CPT | Performed by: INTERNAL MEDICINE

## 2025-04-29 PROCEDURE — 25010000002 HEPARIN LOCK FLUSH PER 10 UNITS: Performed by: INTERNAL MEDICINE

## 2025-04-29 PROCEDURE — 36591 DRAW BLOOD OFF VENOUS DEVICE: CPT

## 2025-04-29 RX ORDER — SODIUM CHLORIDE 0.9 % (FLUSH) 0.9 %
10 SYRINGE (ML) INJECTION AS NEEDED
OUTPATIENT
Start: 2025-04-29

## 2025-04-29 RX ORDER — SODIUM CHLORIDE 0.9 % (FLUSH) 0.9 %
10 SYRINGE (ML) INJECTION AS NEEDED
Status: DISCONTINUED | OUTPATIENT
Start: 2025-04-29 | End: 2025-04-30 | Stop reason: HOSPADM

## 2025-04-29 RX ORDER — GABAPENTIN 300 MG/1
300 CAPSULE ORAL 4 TIMES DAILY
Qty: 120 CAPSULE | Refills: 3 | Status: SHIPPED | OUTPATIENT
Start: 2025-04-29

## 2025-04-29 RX ORDER — HEPARIN SODIUM (PORCINE) LOCK FLUSH IV SOLN 100 UNIT/ML 100 UNIT/ML
500 SOLUTION INTRAVENOUS AS NEEDED
OUTPATIENT
Start: 2025-04-29

## 2025-04-29 RX ORDER — SODIUM CHLORIDE 9 MG/ML
20 INJECTION, SOLUTION INTRAVENOUS ONCE
Status: DISCONTINUED | OUTPATIENT
Start: 2025-04-29 | End: 2025-04-30 | Stop reason: HOSPADM

## 2025-04-29 RX ORDER — SODIUM CHLORIDE 9 MG/ML
20 INJECTION, SOLUTION INTRAVENOUS ONCE
Status: CANCELLED | OUTPATIENT
Start: 2025-04-29

## 2025-04-29 RX ORDER — HEPARIN SODIUM (PORCINE) LOCK FLUSH IV SOLN 100 UNIT/ML 100 UNIT/ML
500 SOLUTION INTRAVENOUS AS NEEDED
Status: DISCONTINUED | OUTPATIENT
Start: 2025-04-29 | End: 2025-04-30 | Stop reason: HOSPADM

## 2025-04-29 RX ADMIN — Medication 10 ML: at 15:30

## 2025-04-29 RX ADMIN — SODIUM CHLORIDE 1500 MG: 9 INJECTION, SOLUTION INTRAVENOUS at 14:24

## 2025-04-29 RX ADMIN — HEPARIN SODIUM (PORCINE) LOCK FLUSH IV SOLN 100 UNIT/ML 500 UNITS: 100 SOLUTION at 15:30

## 2025-04-29 NOTE — PROGRESS NOTES
Port accessed and flushed with good blood return noted. 10cc of blood wasted prior to specimen collection. Blood specimen obtained and sent to lab for processing per protocol.  Port flushed with saline and heparin prior to needle removal. Pt to lobby for md joshuat

## 2025-04-29 NOTE — PROGRESS NOTES
Patient to infusion for C4 Imfinizi after visit today with Dr Huynh. VSS, lab parameters met. Tolerated infusion well. Noted Dr Huynh adding Xgeva to receive next cycle.     Printed AVS and discharged home.

## 2025-04-29 NOTE — PATIENT INSTRUCTIONS
Durvalumab today, ordered Xgeva (to start next time); RTC in 4 weeks with labs prior and MD followed by next durvalumab (they need to put xgeva shot on too)    New gabapentin Rx sent in (take one capsule when you wake up, one after lunch, and 2 approx one hour prior to bed)

## 2025-05-02 DIAGNOSIS — Z51.81 ENCOUNTER FOR THERAPEUTIC DRUG MONITORING: ICD-10-CM

## 2025-05-02 DIAGNOSIS — G89.3 NEOPLASM RELATED PAIN: ICD-10-CM

## 2025-05-02 DIAGNOSIS — C34.11 PRIMARY ADENOCARCINOMA OF UPPER LOBE OF RIGHT LUNG: ICD-10-CM

## 2025-05-02 DIAGNOSIS — Z29.89 ENCOUNTER FOR IMMUNOTHERAPY: ICD-10-CM

## 2025-05-02 DIAGNOSIS — Z79.899 ENCOUNTER FOR LONG-TERM (CURRENT) USE OF HIGH-RISK MEDICATION: ICD-10-CM

## 2025-05-02 DIAGNOSIS — C34.90 NON-SMALL CELL LUNG CANCER METASTATIC TO INTRATHORACIC LYMPH NODE: ICD-10-CM

## 2025-05-02 DIAGNOSIS — C77.1 NON-SMALL CELL LUNG CANCER METASTATIC TO INTRATHORACIC LYMPH NODE: ICD-10-CM

## 2025-05-02 RX ORDER — OXYCODONE HYDROCHLORIDE 5 MG/1
10 CAPSULE ORAL EVERY 6 HOURS PRN
Qty: 240 CAPSULE | Refills: 0 | Status: SHIPPED | OUTPATIENT
Start: 2025-05-02 | End: 2025-05-09

## 2025-05-09 DIAGNOSIS — C34.11 MALIGNANT NEOPLASM OF UPPER LOBE OF RIGHT LUNG: Primary | ICD-10-CM

## 2025-05-09 DIAGNOSIS — C79.51 NON-SMALL CELL LUNG CANCER METASTATIC TO BONE: ICD-10-CM

## 2025-05-09 DIAGNOSIS — C34.90 NON-SMALL CELL LUNG CANCER METASTATIC TO BONE: ICD-10-CM

## 2025-05-09 RX ORDER — OXYCODONE HYDROCHLORIDE 10 MG/1
10 TABLET ORAL EVERY 6 HOURS PRN
Qty: 120 TABLET | Refills: 0 | Status: SHIPPED | OUTPATIENT
Start: 2025-05-09

## 2025-05-21 NOTE — PROGRESS NOTES
HEMATOLOGY ONCOLOGY OUTPATIENT FOLLOW UP       Patient name: Laura Higginbotham  : 1960  MRN: [id number]  Primary Care Physician: Laura Archer APRN  Referring Physician: Laura Archer APRN  Reason For Consult: right hilar adenocarcinoma of the lung      History of Present Illness:  Patient is a 64 y.o. PMH of smoking, skin cancer, arthritis, GERD who presents for newly diagnosed lung cancer.    -2024 Pt went to Dr Archer for increased nonproductive coughing and feeling more dysnpeic on exertion. Was dx with pleurisy and given prednisone which helped somewhat.  -2024 Since cough hadn't completely gone away in 6 weeks, patient went back to Dr. Archer who became concerned and ordered a CXR and antibiotics.  -CXR showed a right hilar mass, CT was ordered.    -2024 CT chest at [facility]: Revealed a very large right hilar mass measuring 9.2 x 9.5 cm in diameter with hypodensity centrally suggesting a necrotic center.  There is obstruction of the right upper lobe bronchus with the infiltrate seen extending peripheral to that area and marked elevation of the right hilum.  Mass extends into the right hilar lymph nodes.    Patient was seen in consultation by Dr. Crystal Powell, pulmonology. She denied having a cough, hemoptysis, wheezing, dyspnea, fever, unintentional weight loss.    -2020 for EBUS bronchoscopy by Dr. Crystal Powell with FNA to the right hilar node 10R and to the right hilar mass revealing inés revealing malignant cells favoring non-small cell.    Pathology showed in the right hilar mass IHC positive for CK7, TTF-1, and Napsin, negative for p63 and CK/6 consistent with pulmonary adenocarcinoma negative for CD56, chromogranin, synaptophysin excluding neuroendocrine component.  Per discussion with pathology: Passes from the right hilar mass were found to be acellular, viable tissue was from the lymph node biopsied and 1 cassette is available for future testing.  -Pneumocystis was  negative,  respiratory culture showing rare growth of normal respiratory alexi with no S. aureus or Pseudomonas aeruginosa detected.,  Respiratory panel PCR negative, PRELIM: no acid-fast bacilli seen on concentrated smear at 1 week, No isolated fungus at 1 week,.  -8/30/2024 CBC showed WBC 11.53, hemoglobin 13.1, hematocrit 40.7 with essentially normal indices, platelets 332K    -9/12/2024 Patient presented for initial consultation with her niece reporting constant nonproductive cough, she still denies having hemoptysis, wheezing, dyspnea, fever, or unintentional weight loss, new bony pains, no new H/A or focal neuro deficit except right under arm new tingling/burning.  She has a history of smoking 1 PPD- started at 17, this summer cut down to 1/2 PPD mostly because of the coughing (47+ years).    -9/20/2024 MRI brain with without contrast with no acute intracranial process identified, findings suggestive of minimal chronic small vessel ischemic disease but no evidence of intracranial metastasis    -9/23/2025 Caris shows PD-L1 positive with TPS 80% by PDL1 test 22C3, with level 1 evidence for cemiplimab and pembrolizumab, positive by PD-L1 28-8 with level 1 evidence for nivolumab/ipilimumab combination, positive by PD-L1  with TC 1+, 50% with atezolizumab in the metastatic setting level 1 evidence, and PD-L1 positive and  positive TC 1+60% with a benefit of atezolizumab in the adjuvant setting, and cemiplimab level 1 evidence.  -ALK negative/fusion not detected by RNA in the tumor, BRAF mutation not detected, EGFR mutation not detected, MET variant transcript not detected, RET fusion not detected, ROS1 fusion not detected.  Tumor mutation burden was high at 18, microsatellite stable.  KRAS pG12V found and 61%, TP53 mK566C found in 56%, NFKBIA was amplified    -9/25/2024 NM PET/CT skull initial staging: Heterogeneous right upper lobe lung mass extending into the superior right lower lobe, encasing the  right mainstem bronchus, encasing and narrowing the right upper lobe bronchus, is hypermetabolic (mSUV 8.76), consistent with malignancy. It has heterogeneous areas of photopenia, suggesting internal necrosis. Pre-carinal lymph node measuring 14 mm short axis is FDG avid (mSUV 4.0), consistent with malignancy.  No hybrid avidity in the neck, abdomen, pelvis, or skeleton. No left hilar adenopathy. No suspicious left lung nodules. Normal heart size with coronary calcifications. Benign calcified mediastinal and left hilar lymph nodes are present.    -10/2/2024 f/u w/ niece reporting better nonproductive cough and rib pain since being oxycodone low dose. Got SIMMED yesterday for radiation. She has a history of smoking 1 PPD- started at 17, still on 1/2 PPD - gum stuck to dentures.    -10/3/2024 B12 1000 mcg given IM  -10/10 s/p Mediport placement    -10/14/2024 started chemoradiation (cisplatin +pemetrexed) reporting still with nonproductive cough and rib pain since being oxycodone low dose. Picked up her nausea medications, got her port,  her Emla cream (ports still a little tender), started her folate soon as she got it.  Did get her chemo teach and got her B12 shot as well she is ready to go.    -11/4/2024 Follow up for C2 chemoradiation reporting nonproductive cough and rib pain both better since being oxycodone low dose, using stool softener and yogurt which has greatly helped with stooling appropriately (EOD), patient had a lot of nausea the first 9 days, needing the zofran Q8H that time, a lot of fatigue.  -11/22/2024.  Patient completed radiation therapy to a total dose of 60 Gray (2 Gray per fraction with total of 30 fractions (under Dr. Ivan Dasilva).    -12/2/2024 Follow up for C3 chemotherapy, completed radiation last week.  She is reporting more productive cough (mostly clear and thick occ some green); reports when she is drinking warm liquids or cold liquids he is still having pain when she tries  to swallow the temperature seems to go down her throat where she chokes a little bit and then seems to radiate over to the upper right side of her chest this is relatively new.  Denies having any fevers, chills, pleuritic chest pain, shortness of breath in fact breathing seems to be easier.  She feels wiped out and rib pain better since being oxycodone low dose, using stool softener and yogurt which has greatly helped with stooling appropriately (EOD), patient still had a lot of nausea the first 9 days, even with addition of Zyprexa, needing the zofran Q8H that time, and admits that she is still taking the dexamethasone as prescribed day before, day of, day after and those are proximal to starting day of.    -Reviewed the report of the CT chest done at [facility] a few days ago shows some improvement of the size of her tumor and no concern of PNA or TE fistula.     -12/19/2024 Patient presents in follow up to ensure she will be ready for C4 chemotherapy on 12/31.  She is reporting the farther she gets from radiation, the more improvement she has in the productive cough (mostly clear and thick occ some green); reports also some improvement in the sensation of when she is drinking warm liquids or cold liquids not having as frequent or as severe pain when she tries to swallow the temperature seems to go down her throat, nor is she choking as much or radiating- over to the upper right side of her chest.  Denies having any fevers, chills, pleuritic chest pain, shortness of breath in fact breathing seems to be easier.  Her energy is a littler better, and rib pain better since completion of radiation;   -medicare finally filling the next oxycodone low dose Rx ordered by Dr Dasilva (also has Narcan)  -using stool softener and yogurt which has greatly helped with stooling appropriately (EOD)  -patient still had a lot of nausea the first 9 days, even with addition of Zyprexa (needs refill for C4), needing the zofran Q8H that  time,   -still taking the dexamethasone as prescribed day before, day of, day after and those are proximal to starting day of (needs refill for C4).    -1/16/2025 Got COVID H/A sore throat, body aches-all better except for back- in the middle, down towards the bottom (never ran fever) Her original productive cough-(mostly clear and thick occ some green); got worse with COVID- still worse, still not better. No shortness of breath. Because of this, has missed her 4th and last dose of chemo. Still not feeling well enough for in person visit today.    2/4/2025 1st imfinzi  -2/13/2025  NM bone scan:Increased uptake in the right third rib posteriorly corresponding to the area of tumor invasion locally in the right upper lobe. No evidence of distant metastatic disease.     -2/24/2025 CT C/A/P w/contrast: Visualized soft tissue of the lower neck demonstrate right-sided port catheter which is coiled within the internal jugular vein with a small amount of fibrin sheath or clot surrounding the catheter. The catheter tip terminates at the junction of the IJ vein and the right subclavian vein, stable prior. Precarinal lymph node decreased in size measuring 8 mm, previously 13 mm. No new mediastinal adenopathy. No pulmonary embolus. The thoracic aortic arch branch vasculature is patent. Within the RUL redemonstration of a mass extending to the right apex with size mildly decreased in the prior study with a representative measurement of 6.8 x 6.5 cm, previously 7.2 x 7.8 cm (4/45) with extension through the pleura to involve the right posterior central third rib as seen on the prior study with increased sclerosis at this site with no new rib involvement. Mass again extends to the right hilum with narrowing of the RUL bronchus and obstruction of the RUL bronchus posterior segment and extends across the major pulmonary fissure to involve the medial superior segment of the RLL. No new or enlarging nodule. Mild superior endplate  "fracture at T4 level unchanged. Mild superior endplate compression fracture at T2 new from the prior study. No retropulsed fracture fragment. Liver is mildly enlarged measuring 20 cm in craniocaudal length. No suspicious liver lesion. The spleen is normal in size. The adrenal glands are normal. Moderate amount of stool in the colon. Small fat-containing paraumbilical hernia. No inguinal adenopathy. No retroperitoneal or central mesenteric adenopathy. No aggressive osseous lesion or acute fracture.     -2/28/2025 MRI T spine done- Subacute appearing compression deformity of the superior endplate of T2. Chronic mild compression deformity of superior plate of T4. No bony retropulsion, subluxation, or canal compromise. Rounded enhancing lesion is seen within the T5 vertebral body left of midline measuring 1.3 x 1.1 cm (series 11 image 22, series 7 image 8), consistent with metastatic disease. Questionable T7 enhancing lesion posteriorly and left of midline measuring 1.2 x 1.2 cm on series 7 image 5, without confident axial correlate, suspicious for metastatic involvement.-findings suspicious for metastatic disease involving T2, T3, as well. 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    -3/4/2025 Pt reports for 2nd imfinzi, her prior COVID s/s (H/A, sore throat, body aches)-still resolved   -Back pain is continued-still different pain from what she had on presentation and still ongoing- in the middle, can radiate down towards the bottom, denies having any popping sensations or crunching feeling. Asking for more pain medication \"Dr Dasilva wouldn't refill it because you did last time.\" Dr Dasilva had scheduled an MRI of the back last week and referred for ?IR kyphoplasty +/- bone biopsy.  -Her original productive cough- (mostly clear and thick occ some green-unchanged); No shortness of breath unless it is a long cough.      -3/10/2025 PET/CT: Increased uptake in the right lobe of the thyroid (may represent a " "thyroid nodule, no nodule is identified on CT scan, mSUV 6). Redemonstration of RUL mass with cavitation with significant decrease in size and FDG activity compared to prior exam (mSUV 12.5), difficult to measure precisely, approximately 3.8 cm in diameter, decreased from prior (4.6 cm). Rim of mild FDG activity surrounding the cavitary portion, which is also decreased in size and activity compared to prior. There remains mild, persistent erosion into the right posterior third rib with mild residual FDG activity (mSUV 3.7). Pretracheal LN remains nonenlarged without FDG activity. No abnormal FDG activity identified to correspond to lesion seen on recent MRI; no abnormal CT appearance to correspond to the lesions on MRI. No osseous lesions in the pelvis. Mild compression deformities of T2 and T4 superior endplates.       -4/1/2025 Pt reports for 3rd imfinzi, her back pain is continued-In the upper middle and the middle, can radiate down towards the bottom and through her leg; still denies having any popping sensations or crunching feeling. Asking for more pain medication as has been out for 5 days; reports was taking as 7.5 mg Q8 H, was only lasting for 5-6 hr and bringing the sharp pain down less than 50%. Had uptitrated the gabapentin to 100mg AM, 200 PM with some relief in the pain and sciatica feeling but still having significant pain.  -Her original productive cough- (mostly clear and thick occ some green) relatively unchanged, still smoking 1/2 PPD \"so I don't ** somebody\"; No shortness of breath unless it is a long cough.      -4/8/2025 CT guided biopsy scheduled; concern the lesion is too high to safely do kyphoplasty.   -4/8/2025 CT guided biopsy scheduled; concern the lesion is too high to safely do kyphoplasty -HOWEVER: No visible bony lesion detected on CT so biopsy and kyphoplasty canceled.     -4/29/2025 Pt reports for 4th imfinzi, her back pain is continued starting to get more control with current " "narcotic prescription through Dr. Dasilva (10 mg 3 times daily with 15 mg prior to bed) as well as the gabapentin that she is using is 300 mg every 8 hours. Still smoking 1/2 PPD \"so I don't ** somebody\"; No shortness of breath unless it is a long cough.    Oncology Treatment  -10/14/2024-2024 started chemoradiation (cisplatin +pemetrexed)  completed )  -2025 adjuvant Imfinzi-ONGOING  -2025 Xgeva-STARTING 25    Monitoring Parameters  -Physical examinations  -laboratory testing  -CT scans and PET scans       Subjective:  -2025 Pt reports for  imfinzi, her back pain is continued starting to get more control with current narcotic prescription through Dr. Dasilva as well as the gabapentin that she is using is 300 mg every 8 hours; more recently feeling return of numbness/PN in right arm only (had been there previously). Still smoking 1/2 PPD; No shortness of breath unless it is a long cough and the coughing isn't as often, still with clear thick sputum sometimes.       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 2025       Past Surgical History:   Procedure Laterality Date    BRONCHOSCOPY N/A 2024    Procedure: BRONCHOSCOPY WITH BRONCHIAL WASHING ,ENDOBRONCHIAL ULTRASOUND WITH FINE NEEDLE ASPIRATION X2 AREAS;  Surgeon: Crystal Powell MD;  Location: [facility];  Service: Pulmonary;  Laterality: N/A;     SECTION      x3    KNEE SURGERY Left     ORIF, screws and plates, has had Operation on it x 3         Current Outpatient Medications:     albuterol sulfate  (90 Base) MCG/ACT inhaler, Inhale 1 puff Every 4 (Four) Hours As Needed for Wheezing., Disp: , Rfl:     Breztri Aerosphere 160-9-4.8 MCG/ACT aerosol inhaler, Inhale 2 puffs 2 (Two) Times a Day., Disp: , Rfl:     famotidine (PEPCID) 20 MG tablet, Take 1 tablet by mouth 2 (Two) " Times a Day., Disp: 30 tablet, Rfl: 4    gabapentin (NEURONTIN) 300 MG capsule, Take 1 capsule by mouth 4 (Four) Times a Day. As instructed: 1 capsule in AM, 1 capsule in afternoon, 2 capsules 1 hour before bed, Disp: 120 capsule, Rfl: 3    Glucose Blood (Blood Glucose Test Strips 333) strip, Check sugar twice a day and PRN and keep a log In Vitro, Disp: , Rfl:     lidocaine-prilocaine (EMLA) 2.5-2.5 % cream, Apply 1 Application topically to the appropriate area as directed Every 2 (Two) Hours As Needed for Mild Pain. To port site before chemo/blood draw, Disp: 30 g, Rfl: 2    metFORMIN (GLUCOPHAGE) 1000 MG tablet, Every 12 (Twelve) Hours., Disp: , Rfl:     naloxone (NARCAN) 4 MG/0.1ML nasal spray, Call 911. Don't prime. [place] in 1 nostril for overdose. Repeat in 2-3 minutes in other nostril if no or minimal breathing/responsiveness., Disp: 2 each, Rfl: 0    ondansetron ODT (ZOFRAN-ODT) 8 MG disintegrating tablet, Take 1 tablet by mouth Every 8 (Eight) Hours As Needed for Nausea or Vomiting., Disp: 45 tablet, Rfl: 3    oxyCODONE (ROXICODONE) 10 MG tablet, Take 1 tablet by mouth Every 6 (Six) Hours As Needed for Moderate Pain (cancer treatment related pain)., Disp: 120 tablet, Rfl: 0    prochlorperazine (COMPAZINE) 10 MG tablet, Take 1 tablet by mouth Every 6 (Six) Hours As Needed for Nausea or Vomiting., Disp: 60 tablet, Rfl: 1    sennosides-docusate (senna-docusate sodium) 8.6-50 MG per tablet, Take 2 tablets by mouth Daily., Disp: 60 tablet, Rfl: 3  No current facility-administered medications for this visit.    Facility-Administered Medications Ordered in Other Visits:     alteplase (CATHFLO/ACTIVASE) injection 2 mg, 2 mg, Intracatheter, Q2H PRN, Holli Dickerson APRN, New Syringe/Cartridge at 05/27/25 1327    heparin injection 500 Units, 500 Units, Intravenous, PRN, Veronica Huynh MD PhD    sodium chloride 0.9 % flush 10 mL, 10 mL, Intravenous, PRN, Veronica Huynh MD PhD, 10 mL at 05/27/25  1300    sodium chloride 0.9 % infusion, 20 mL/hr, Intravenous, Once, Veronica Huynh MD PhD    No Known Allergies    Family History   Problem Relation Age of Onset    Lung cancer Brother         Lung cancer/Leukemia    Cancer Brother        Cancer-related family history includes Cancer in her brother; Lung cancer in her brother.    Social History     Tobacco Use    Smoking status: Every Day     Current packs/day: 0.50     Average packs/day: 0.5 packs/day for 48.4 years (24.2 ttl pk-yrs)     Types: Cigarettes     Start date: 1977    Smokeless tobacco: Never   Vaping Use    Vaping status: Never Used   Substance Use Topics    Alcohol use: Not Currently    Drug use: Never     Social History     Social History Narrative    Not on file      Review of Systems:  Constitutional: +fatigue (slightly improved), Denies any weakness, lack of appetite, excessive appetite, weight change, chills or fever.  Eyes: Reports no blurry vision, no double vision, no pain in the eyes, dry eyes or tearing.  ENT: Reports no decreased hearing capacity, ear pain or tinnitus. Denies any epistaxis, nasal congestion, mouth sores or bleeding, tooth or jaw pain, sore throat or hoarseness.  Respiratory: + chronic cough (better with oxy)  Denies any sputum production or hemoptysis. There is no wheezing, shortness of breath or any other respiratory complaints.  Cardiovascular: + shortness of breath with activity (slightly improved), Denies any chest pain, shortness of breath when lying down, palpitations, or leg swelling.  Breasts: Denies any lumps, bumps, pain, nipple changes or discharge in the breasts.  Gastrointestinal: painful swallowing (resolved), nausea (resolved), vomiting (resolved), constipation (better per HPI) There are no complaints of abdominal pain, difficulty swallowing or anorexia, diarrhea,  heartburn, blood in the stools, dark stools, or change in bowel habits or hemorrhoids.  Genitourinary: Denies any pain or burning on  "urination, blood in the urine or frequent urination.   Musculoskeletal: +upper and mid back pain (continued and slightly improved per HPI), Denies painful joints, no swelling of the joints, muscle pain. Denies any pain or tingling in the legs, hands or feet.  Neuropsychiatric: Denies any nervousness, depressed mood, headaches, blackouts, dizziness, weakness of limbs, loss of sensation, loss of balance, loss of coordination or difficulty in speaking.  Cutaneous: Denies any dry skin, itching, rash, change in the color of the skin, or any other cutaneous complaints.  Hematologic/Lymphatic: Denies any bruising or bleeding. Report no recent development of palpable or painful lymph nodes.  Allergy/Immunology: Denies rash/hayfever symptoms or any recent history of pneumonia, recurrent sinusitis, urinary infection or any other history of an ongoing infection.  Endocrine: Reports no intolerance to heat or cold, excessive thirst or excessive urination..    Objective:  Vital signs:  Vitals:    25 1300   BP: 125/76   Pulse: 93   Temp: 98 °F (36.7 °C)   TempSrc: Oral   SpO2: 99%   Weight: 75.1 kg (165 lb 9.6 oz)   Height: 172.7 cm (67.99\")   PainSc: 6    PainLoc: Back     Body mass index is 25.19 kg/m².      Physical Exam:   ECO  GENERAL: The patient is a pleasant middle aged white female who appears their stated age and is awake, in no acute distress.  SKIN: No rash or ulcers.  HEME/LYMPHATICS: No bruising or petechiae on visual inspection. No lymphadenopathy on visual inspection and palpation of cervical, supraclavicular, infraclavicular lymph nodes.  HEAD/FACE: atraumatic and normocephalic. Normal hair.  EYES: PERRLA/EOMI. No scleral icterus. No tearing or dry eyes.  ENT: External ears normal with no evidence of discharge. No evidence of ulceration or bleeding in the nostrils. Mouth has no ulcers, bleeding or inflammation of the gums, floor or roof of the mouth with normal dentition.  NECK: Supple, symmetric. "   CHEST/RESPIRATORY: Expansion maintained bilaterally and symmetrically. On auscultation, clear breath sounds in left lung, decreased breath sounds in upper right lung field (slightly better). No wheezes, rhonchi or rales.  BREAST: Deferred.  CARDIOVASCULAR: On auscultation, regular rate and rhythm. No murmurs, gallops or rubs are heard.  GASTROINTESTINAL/ABDOMEN: Symmetric. On auscultation of the abdomen, there are normal bowel sounds in all four quadrants. There are no surgical scars in the abdomen. Nontender to palpation.  GENITOURINARY: Deferred.  NEUROLOGIC: Alert and oriented time, person, and place, with no apparent changes in recent or remote memory. Cranial nerves II-XII are grossly intact. Sensation is maintained in the extremities. Strength and tone appear normal for age and equal in the extremities. Gait is normal.  MUSCULOSKELETAL: + TTP on midback for most of thoracic spine and paraspinous mm. No cyanosis, clubbing or edema in the extremities. There are no surgical scars on the extremities.  PSYCHIATRIC: The patient maintains judgment and has good insight. The patient has no changes in mood or affection.  IV: right chest Mediport accessed, NTTP, no erythema or edema.    Lab Results - Last 18 Months   Lab Units 05/27/25  1319 04/29/25  1306 04/08/25  0822   WBC 10*3/mm3 6.20 6.03 8.22   HEMOGLOBIN g/dL 12.9 13.0 12.9   HEMATOCRIT % 40.3 40.3 40.0   PLATELETS 10*3/mm3 284 307 284   MCV fL 90.8 90.6 88.5     Lab Results - Last 18 Months   Lab Units 05/27/25  1319 04/29/25  1306 04/01/25  1325   SODIUM mmol/L 137 136 137   POTASSIUM mmol/L 4.2 4.2 4.2   CHLORIDE mmol/L 100 100 102   CO2 mmol/L 27.4 25.0 24.5   BUN mg/dL 12.5 18 11   CREATININE mg/dL 0.84 1.17* 0.85   CALCIUM mg/dL 9.8 9.9 9.9   BILIRUBIN mg/dL 0.3 0.2 <0.2   ALK PHOS U/L 97 102 90   ALT (SGPT) U/L 15 15 11   AST (SGOT) U/L 12 15 12   GLUCOSE mg/dL 154* 127* 107*       Lab Results   Component Value Date    GLUCOSE 154 (H) 05/27/2025    BUN  "12.5 05/27/2025    CREATININE 0.84 05/27/2025    BCR 14.9 05/27/2025    K 4.2 05/27/2025    CO2 27.4 05/27/2025    CALCIUM 9.8 05/27/2025    ALBUMIN 4.2 05/27/2025    AST 12 05/27/2025    ALT 15 05/27/2025       Lab Results - Last 18 Months   Lab Units 04/08/25  0822 10/10/24  0810 08/26/24  0945   INR  0.92 <0.93* 0.93   APTT seconds 30.4 30.8 27.1       No results found for: \"IRON\", \"TIBC\", \"FERRITIN\"    No results found for: \"FOLATE\"    No results found for: \"OCCULTBLD\"    No results found for: \"RETICCTPCT\"  No results found for: \"SOBLAGPA63\"  No results found for: \"SPEP\", \"UPEP\"  No results found for: \"LDH\", \"URICACID\"  No results found for: \"BEBETO\", \"RF\", \"SEDRATE\"  No results found for: \"FIBRINOGEN\", \"HAPTOGLOBIN\"  Lab Results   Component Value Date    PTT 30.4 04/08/2025    INR 0.92 04/08/2025     No results found for: \"\"  No results found for: \"CEA\"  No components found for: \"CA-19-9\"  No results found for: \"PSA\"  No results found for: \"SEDRATE\"    Assessment & Plan     65 y.o. female with PMH of smoking, skin cancer, arthritis, GERD who presented 8/2024  for newly diagnosed lung cancer, s/p definitive chemoradiation (completed 11/22/2024), currently on immunotherapy.    Right hilar mass/right upper lobe adenocarcinoma, at minimum T4 N2 (IIIB); 2/28/2025 with pain and imaging concerning for progression to stage IV (bone)    Previously discussed the above results which include imaging and pathology with the patient.  -Staging PET/CT no metastatic disease but confirmed IIIB disease, nonsurgical candidate-previously discussed this in detail with the patient and her family and explained that the standard of care at this point would be chemoradiation with curative intent.  Discussed the couple chemotherapy regimens that can be used for radiation.  -Staging brain MRI negative for metastasis    -Radiology Oncology on board: pt completed her concurrent chemoradiation -11/22/2024  -PFTs from pulmonologist done per " patient- per patient 50-60%    -Biomarker testing +PD-L1 testing (category 1), +KRAS G12C, negative EGFR mutation including exon 19 del, L858R and other deletions, exon 20 insertions, (category 1), ALK fusions (category 1), ROS1 fusions, BRAF V600E, NTRK 1/2/3 fusions, MET exon 14 skipping or amplifications, RET fusions, ErbB2 (HER2) alterations,    -10/10/24 s/p Mediport placement with EMLA cream available  Didn't get cycle 4 chemotherapy on 12/31/2024 given COVID    -12/18/2024 PRIORITY CT chest scan already showing improvement in lung cancer size with no signs of progression near end of radiation completion showing improvement in lung cancer. Cannot do repeat imaging at this time given recent COVID infection (has been known to make lung nodules on imaging)  -1/16/25 Discussed durvalumab maintenance therapy every 2-4 weeks for one year per NCCN guidelines and FDA approval. We discussed that from the [facility] trial data, in stage III NSCLC nonresectable patients that receive durvalumab post definitive chemoradiation vs. those that did not, have a 48% reduction in risk of progression. 5 year post hoc analysis showed median overall survival with durvalumab was 47.5 months vs. 29.1 months with placebo. HR 0.72. We previously discussed that durvalumab is a PD-L1 inhibitor and classified as immunotherapy. Patient was provided a [facility] information sheet on Durvalumab. We discussed side effects including but not limited to immune mediated effects such as pneumonitis, colitis and hepatitis as well as nausea, diarrhea, fatigue, electrolyte abnormalities and infusion reactions.    PLAN: cisplatin plus pemetrexed Q21 days X 3 cycles with concurrent radiation with repeat imaging showing improvement no progression (completed), followed by durvalumab 1500 mg Q4 weeks x 12 cycles (category 1 for stage III)-ONGOING     -2/24/2024 CT C/A/P w/contrast: Right-sided port catheter coiled within the IJ vein with a small amount of  fibrin sheath or clot surrounding the catheter. The catheter tip terminates at the junction of the IJ vein and the right subclavian vein, stable prior. Precarinal LN decreased in size measuring 8 mm, previously 13 mm. No new mediastinal adenopathy. RUL redemonstration of a mass extending to the right apex with size mildly decreased in the prior study with a representative measurement of 6.8 x 6.5 cm, previously 7.2 x 7.8 cm with extension through the pleura to involve the right posterior central third rib as seen on the prior study with increased sclerosis at this site with no new rib involvement. Mass again extends to the right hilum with narrowing of the RUL bronchus and obstruction of the RUL bronchus posterior segment and extends across the major pulmonary fissure to involve the medial superior segment of the RLL. No new or enlarging nodule. Mild superior endplate fracture at T4 level unchanged. Mild superior endplate compression fracture at T2 new from the prior study. No retropulsed fracture fragment. Liver is mildly enlarged measuring 20 cm in craniocaudal length. No aggressive osseous lesion or acute fracture.    -4/29/2025 discussed possibly adding bisphosphonate/denosumab -unable to get biopsy as CT no longer showing a spot in the bone to biopsy; will not need dental clearance (has full dentures).  Patient okay with proceeding particularly since it may help with pain if she does not fact have cancer in the bone.  Xgeva monthly ordered today for prior Auth with plans to start on cycle 5 of immunotherapy.    -5/27/2025 patient H&P within acceptable parameters, proceed with C5 today (with C1 of Xgeva #1) and RTC in 4 weeks (June) with labs for C6D1 and Xgeva #2, then RTC in 8 weeks (July) for labs, MD millard and infusion.  -*Data has shown a Palliative consult to help manage symptoms early on in care for advanced stage lung cancer patients-pt had been referred to [facility] by [facility] but unfortunately not in pt's  network.  -6/23/2025 CT chest abdomen and pelvis scheduled; 6/26/2025 has follow-up with Dr. Dasilva to review      2. Worsening mid-level back pain, sub-acute  -2/4/2025 patient reporting not the same kind of pain she had on presentation (with the local invasion of the lung cancer into local tissues), getting more consistent aching pain. Was getting Светлана 7.5 liquid and using 1-2 times a day, occ up to 3X/day.    -3/4/2025: Re-ordered Светлана 7.5 mg (1.5 tablets) every 8 hours as needed for pain, 14-day supply.  -Patient already knows about bowel regimens do not get constipated.  -2/13/2025 NM bone scan: Increased uptake in the right third rib posteriorly corresponding to the area of tumor invasion locally in the right upper lobe. No evidence of distant metastatic disease.   -2/24/25 CT C/A/P as in #1: New mild superior endplate compression fracture at T2;   -2/28/2025 MRI T spine done- Subacute appearing compression deformity of the superior endplate of T2. Chronic mild compression deformity of superior plate of T4. No bony retropulsion, subluxation, or canal compromise. Rounded enhancing lesion is seen within the T5 vertebral body left of midline measuring 1.3 x 1.1 cm (series 11 image 22, series 7 image 8), consistent with metastatic disease. Questionable T7 enhancing lesion posteriorly and left of midline measuring 1.2 x 1.2 cm on series 7 image 5, without confident axial correlate, suspicious for metastatic involvement.-findings suspicious for metastatic disease involving T2, T3, as well. 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  -3/10/2025 PET/CT: Increased uptake in the right lobe of the thyroid (may represent a thyroid nodule, no nodule is identified on CT scan, mSUV 6). Redemonstration of RUL mass with cavitation. Significant decrease in size and FDG activity compared to the prior exam (mSUV 12.5), difficult to measure precisely, approximately 3.8 cm in diameter, decreased from prior (4.6 cm).  Rim of mild FDG activity surrounding the cavitary portion, which is also decreased in size and activity compared to prior. There remains mild, persistent erosion into the right posterior third rib with mild residual FDG activity (mSUV 3.7). No extension into the chest wall or new pulmonary nodules. No abnormal FDG activity identified to correspond to lesion seen on recent MRI; no abnormal CT appearance to correspond to the lesions on MRI. No osseous lesions in the pelvis. Mild compression deformities of T2 and T4 superior endplates.  -4/1/2025: Re-ordered Светлана 10 mg every 6-8 hours as needed for pain, 30-day supply; also switching from gabapentin 100 AM and 200 PM to titrating up gabapentin 300 TID as discussed.  -4/29/25: Dr. Dasilva ordering for oxycodone, reordered gabapentin 300 mg in the morning, afternoon, with 600 mg 1 hour prior to bed for better control of pain while she is trying to sleep with refills.  Also ordered Xgeva as above  -5/27/25 stable back pain; no thyroid s/s, having some right arm neuropathy again (had gone for a few months)  -continue to monitor      3. Tobacco dependence  -tried nicotine gum but stuck to dentures, is 1/2 ppd;pt does not think she will be able to stop smoking  -discussed again today, pt not ready to decrease further yet      4.  Hyperglycemia, noted after start of treatment to 300s; improved after less dexamethasone (when chemo ended)  -Patient started on 1000 mg metformin daily by PCP, patient has had metformin changed and is currently taking at 1000 mg twice daily  -Continue to monitor;       5. Elevated TSH C3 immunotherapy  -4/1/2025 likely immunotherapy related; T4 normal  - 4/29/2025 TSH now up to 15 with T4 still within normal limits at 0.93; discussed with patient at some point may need to start levothyroxine but we will continue to monitor until then  -5/27/2025 TSH 15.9 T4 still WNL  -will continue to monitor; if/when T4 affected will need to add  levothyroxine      Thank you very much for providing the opportunity to participate in this patient’s care. Please do not hesitate to call if there are any other questions.

## 2025-05-27 ENCOUNTER — HOSPITAL ENCOUNTER (OUTPATIENT)
Dept: ONCOLOGY | Facility: HOSPITAL | Age: 65
Discharge: HOME | End: 2025-05-27
Payer: MEDICARE

## 2025-05-27 ENCOUNTER — OFFICE VISIT (OUTPATIENT)
Dept: ONCOLOGY | Facility: CLINIC | Age: 65
End: 2025-05-27
Payer: MEDICARE

## 2025-05-27 ENCOUNTER — LAB (OUTPATIENT)
Dept: LAB | Facility: HOSPITAL | Age: 65
End: 2025-05-27
Payer: MEDICARE

## 2025-05-27 VITALS
SYSTOLIC BLOOD PRESSURE: 125 MMHG | OXYGEN SATURATION: 99 % | WEIGHT: 165.6 LBS | HEIGHT: 68 IN | TEMPERATURE: 98 F | BODY MASS INDEX: 25.1 KG/M2 | DIASTOLIC BLOOD PRESSURE: 76 MMHG | HEART RATE: 93 BPM

## 2025-05-27 DIAGNOSIS — C34.90 NON-SMALL CELL LUNG CANCER METASTATIC TO BONE: ICD-10-CM

## 2025-05-27 DIAGNOSIS — C34.11 MALIGNANT NEOPLASM OF UPPER LOBE OF RIGHT LUNG: ICD-10-CM

## 2025-05-27 DIAGNOSIS — C34.11 PRIMARY ADENOCARCINOMA OF UPPER LOBE OF RIGHT LUNG: ICD-10-CM

## 2025-05-27 DIAGNOSIS — C34.90 NON-SMALL CELL LUNG CANCER METASTATIC TO INTRATHORACIC LYMPH NODE: ICD-10-CM

## 2025-05-27 DIAGNOSIS — C79.51 NON-SMALL CELL LUNG CANCER METASTATIC TO BONE: ICD-10-CM

## 2025-05-27 DIAGNOSIS — C77.1 NON-SMALL CELL LUNG CANCER METASTATIC TO INTRATHORACIC LYMPH NODE: ICD-10-CM

## 2025-05-27 DIAGNOSIS — G89.3 NEOPLASM RELATED PAIN: ICD-10-CM

## 2025-05-27 DIAGNOSIS — Z29.89 ENCOUNTER FOR IMMUNOTHERAPY: ICD-10-CM

## 2025-05-27 DIAGNOSIS — C34.11 MALIGNANT NEOPLASM OF UPPER LOBE OF RIGHT LUNG: Primary | ICD-10-CM

## 2025-05-27 DIAGNOSIS — Z51.81 ENCOUNTER FOR THERAPEUTIC DRUG MONITORING: ICD-10-CM

## 2025-05-27 LAB
ALBUMIN SERPL-MCNC: 4.2 G/DL (ref 3.5–5.2)
ALBUMIN/GLOB SERPL: 1.5 G/DL
ALP SERPL-CCNC: 97 U/L (ref 39–117)
ALT SERPL W P-5'-P-CCNC: 15 U/L (ref 1–33)
ANION GAP SERPL CALCULATED.3IONS-SCNC: 9.6 MMOL/L (ref 5–15)
AST SERPL-CCNC: 12 U/L (ref 1–32)
BASOPHILS # BLD AUTO: 0.03 10*3/MM3 (ref 0–0.2)
BASOPHILS NFR BLD AUTO: 0.5 % (ref 0–1.5)
BILIRUB SERPL-MCNC: 0.3 MG/DL (ref 0–1.2)
BUN SERPL-MCNC: 12.5 MG/DL (ref 8–23)
BUN/CREAT SERPL: 14.9 (ref 7–25)
CALCIUM SPEC-SCNC: 9.8 MG/DL (ref 8.6–10.5)
CHLORIDE SERPL-SCNC: 100 MMOL/L (ref 98–107)
CO2 SERPL-SCNC: 27.4 MMOL/L (ref 22–29)
CREAT SERPL-MCNC: 0.84 MG/DL (ref 0.57–1)
DEPRECATED RDW RBC AUTO: 47 FL (ref 37–54)
EGFRCR SERPLBLD CKD-EPI 2021: 77.2 ML/MIN/1.73
EOSINOPHIL # BLD AUTO: 0.08 10*3/MM3 (ref 0–0.4)
EOSINOPHIL NFR BLD AUTO: 1.3 % (ref 0.3–6.2)
ERYTHROCYTE [DISTWIDTH] IN BLOOD BY AUTOMATED COUNT: 14.8 % (ref 12.3–15.4)
GLOBULIN UR ELPH-MCNC: 2.8 GM/DL
GLUCOSE SERPL-MCNC: 154 MG/DL (ref 65–99)
HCT VFR BLD AUTO: 40.3 % (ref 34–46.6)
HGB BLD-MCNC: 12.9 G/DL (ref 12–15.9)
LYMPHOCYTES # BLD AUTO: 1.29 10*3/MM3 (ref 0.7–3.1)
LYMPHOCYTES NFR BLD AUTO: 20.8 % (ref 19.6–45.3)
MAGNESIUM SERPL-MCNC: 1.9 MG/DL (ref 1.6–2.4)
MCH RBC QN AUTO: 29.1 PG (ref 26.6–33)
MCHC RBC AUTO-ENTMCNC: 32 G/DL (ref 31.5–35.7)
MCV RBC AUTO: 90.8 FL (ref 79–97)
MONOCYTES # BLD AUTO: 0.41 10*3/MM3 (ref 0.1–0.9)
MONOCYTES NFR BLD AUTO: 6.6 % (ref 5–12)
NEUTROPHILS NFR BLD AUTO: 4.39 10*3/MM3 (ref 1.7–7)
NEUTROPHILS NFR BLD AUTO: 70.8 % (ref 42.7–76)
PHOSPHATE SERPL-MCNC: 3.5 MG/DL (ref 2.5–4.5)
PLATELET # BLD AUTO: 284 10*3/MM3 (ref 140–450)
PMV BLD AUTO: 8.1 FL (ref 6–12)
POTASSIUM SERPL-SCNC: 4.2 MMOL/L (ref 3.5–5.2)
PROT SERPL-MCNC: 7 G/DL (ref 6–8.5)
RBC # BLD AUTO: 4.44 10*6/MM3 (ref 3.77–5.28)
SODIUM SERPL-SCNC: 137 MMOL/L (ref 136–145)
T4 FREE SERPL-MCNC: 0.96 NG/DL (ref 0.92–1.68)
TSH SERPL DL<=0.05 MIU/L-ACNC: 15.9 UIU/ML (ref 0.27–4.2)
WBC NRBC COR # BLD AUTO: 6.2 10*3/MM3 (ref 3.4–10.8)

## 2025-05-27 PROCEDURE — 25810000003 SODIUM CHLORIDE 0.9 % SOLUTION 250 ML FLEX CONT: Performed by: INTERNAL MEDICINE

## 2025-05-27 PROCEDURE — 83735 ASSAY OF MAGNESIUM: CPT | Performed by: INTERNAL MEDICINE

## 2025-05-27 PROCEDURE — 84100 ASSAY OF PHOSPHORUS: CPT | Performed by: INTERNAL MEDICINE

## 2025-05-27 PROCEDURE — 99213 OFFICE O/P EST LOW 20 MIN: CPT | Performed by: INTERNAL MEDICINE

## 2025-05-27 PROCEDURE — 36415 COLL VENOUS BLD VENIPUNCTURE: CPT

## 2025-05-27 PROCEDURE — 84439 ASSAY OF FREE THYROXINE: CPT | Performed by: INTERNAL MEDICINE

## 2025-05-27 PROCEDURE — 96372 THER/PROPH/DIAG INJ SC/IM: CPT

## 2025-05-27 PROCEDURE — 25010000002 DURVALUMAB 50 MG/ML SOLUTION 10 ML VIAL: Performed by: INTERNAL MEDICINE

## 2025-05-27 PROCEDURE — 36593 DECLOT VASCULAR DEVICE: CPT

## 2025-05-27 PROCEDURE — 85025 COMPLETE CBC W/AUTO DIFF WBC: CPT

## 2025-05-27 PROCEDURE — 25010000002 DENOSUMAB 120 MG/1.7ML SOLUTION: Performed by: INTERNAL MEDICINE

## 2025-05-27 PROCEDURE — 1125F AMNT PAIN NOTED PAIN PRSNT: CPT | Performed by: INTERNAL MEDICINE

## 2025-05-27 PROCEDURE — 84443 ASSAY THYROID STIM HORMONE: CPT | Performed by: INTERNAL MEDICINE

## 2025-05-27 PROCEDURE — 80053 COMPREHEN METABOLIC PANEL: CPT | Performed by: INTERNAL MEDICINE

## 2025-05-27 PROCEDURE — 25010000002 ALTEPLASE 2 MG RECONSTITUTED SOLUTION: Performed by: NURSE PRACTITIONER

## 2025-05-27 PROCEDURE — 96413 CHEMO IV INFUSION 1 HR: CPT

## 2025-05-27 PROCEDURE — 25010000002 HEPARIN LOCK FLUSH PER 10 UNITS: Performed by: INTERNAL MEDICINE

## 2025-05-27 RX ORDER — SODIUM CHLORIDE 0.9 % (FLUSH) 0.9 %
10 SYRINGE (ML) INJECTION AS NEEDED
Status: DISCONTINUED | OUTPATIENT
Start: 2025-05-27 | End: 2025-05-28 | Stop reason: HOSPADM

## 2025-05-27 RX ORDER — HEPARIN SODIUM (PORCINE) LOCK FLUSH IV SOLN 100 UNIT/ML 100 UNIT/ML
500 SOLUTION INTRAVENOUS AS NEEDED
OUTPATIENT
Start: 2025-05-27

## 2025-05-27 RX ORDER — HEPARIN SODIUM (PORCINE) LOCK FLUSH IV SOLN 100 UNIT/ML 100 UNIT/ML
500 SOLUTION INTRAVENOUS AS NEEDED
Status: DISCONTINUED | OUTPATIENT
Start: 2025-05-27 | End: 2025-05-28 | Stop reason: HOSPADM

## 2025-05-27 RX ORDER — SODIUM CHLORIDE 9 MG/ML
20 INJECTION, SOLUTION INTRAVENOUS ONCE
Status: CANCELLED | OUTPATIENT
Start: 2025-05-27

## 2025-05-27 RX ORDER — FAMOTIDINE 20 MG/1
20 TABLET, FILM COATED ORAL 2 TIMES DAILY
Qty: 30 TABLET | Refills: 4 | Status: SHIPPED | OUTPATIENT
Start: 2025-05-27

## 2025-05-27 RX ORDER — SODIUM CHLORIDE 0.9 % (FLUSH) 0.9 %
10 SYRINGE (ML) INJECTION AS NEEDED
OUTPATIENT
Start: 2025-05-27

## 2025-05-27 RX ORDER — SODIUM CHLORIDE 9 MG/ML
20 INJECTION, SOLUTION INTRAVENOUS ONCE
OUTPATIENT
Start: 2025-06-24

## 2025-05-27 RX ORDER — SODIUM CHLORIDE 9 MG/ML
20 INJECTION, SOLUTION INTRAVENOUS ONCE
Status: DISCONTINUED | OUTPATIENT
Start: 2025-05-27 | End: 2025-05-28 | Stop reason: HOSPADM

## 2025-05-27 RX ADMIN — ALTEPLASE: 2.2 INJECTION, POWDER, LYOPHILIZED, FOR SOLUTION INTRAVENOUS at 13:27

## 2025-05-27 RX ADMIN — Medication 10 ML: at 15:45

## 2025-05-27 RX ADMIN — Medication 10 ML: at 13:00

## 2025-05-27 RX ADMIN — DENOSUMAB 120 MG: 120 INJECTION SUBCUTANEOUS at 14:42

## 2025-05-27 RX ADMIN — HEPARIN 500 UNITS: 100 SYRINGE at 15:45

## 2025-05-27 RX ADMIN — SODIUM CHLORIDE 1500 MG: 900 INJECTION, SOLUTION INTRAVENOUS at 14:40

## 2025-05-27 NOTE — PATIENT INSTRUCTIONS
Pending labs today, proceed with durvalumab and start Xgeva monthly    RTC in 4 weeks for labs and txt and 8 weeks with labs, provider follow up with txt after

## 2025-05-27 NOTE — PROGRESS NOTES
Using sterile technique, pt's port accessed for blood collection. Port flushed easily, but no blood return noted. Cathflo instilled per protocol and labs collected via venipuncture.

## 2025-06-11 DIAGNOSIS — C34.11 MALIGNANT NEOPLASM OF UPPER LOBE OF RIGHT LUNG: ICD-10-CM

## 2025-06-11 DIAGNOSIS — G89.3 NEOPLASM RELATED PAIN: ICD-10-CM

## 2025-06-11 RX ORDER — FAMOTIDINE 20 MG/1
20 TABLET, FILM COATED ORAL 2 TIMES DAILY
Qty: 180 TABLET | Refills: 1 | OUTPATIENT
Start: 2025-06-11

## 2025-06-13 DIAGNOSIS — C34.11 MALIGNANT NEOPLASM OF UPPER LOBE OF RIGHT LUNG: ICD-10-CM

## 2025-06-13 DIAGNOSIS — C34.90 NON-SMALL CELL LUNG CANCER METASTATIC TO BONE: ICD-10-CM

## 2025-06-13 DIAGNOSIS — C79.51 NON-SMALL CELL LUNG CANCER METASTATIC TO BONE: ICD-10-CM

## 2025-06-13 RX ORDER — OXYCODONE HYDROCHLORIDE 10 MG/1
10 TABLET ORAL EVERY 6 HOURS PRN
Qty: 120 TABLET | Refills: 0 | Status: SHIPPED | OUTPATIENT
Start: 2025-06-13

## 2025-06-23 ENCOUNTER — HOSPITAL ENCOUNTER (OUTPATIENT)
Dept: PET IMAGING | Facility: HOSPITAL | Age: 65
Discharge: HOME | End: 2025-06-23
Admitting: INTERNAL MEDICINE
Payer: MEDICARE

## 2025-06-23 DIAGNOSIS — C34.11 MALIGNANT NEOPLASM OF UPPER LOBE OF RIGHT LUNG: ICD-10-CM

## 2025-06-23 PROCEDURE — 74177 CT ABD & PELVIS W/CONTRAST: CPT

## 2025-06-23 PROCEDURE — 25510000001 IOPAMIDOL PER 1 ML: Performed by: INTERNAL MEDICINE

## 2025-06-23 PROCEDURE — 71260 CT THORAX DX C+: CPT

## 2025-06-23 RX ORDER — IOPAMIDOL 755 MG/ML
100 INJECTION, SOLUTION INTRAVASCULAR
Status: COMPLETED | OUTPATIENT
Start: 2025-06-23 | End: 2025-06-23

## 2025-06-23 RX ADMIN — IOPAMIDOL 100 ML: 755 INJECTION, SOLUTION INTRAVENOUS at 08:17

## 2025-06-23 NOTE — PROGRESS NOTES
[facility] RADIATION ONCOLOGY  FOLLOW-UP    Name: Laura Higginbotham  YOB: 1960  MRN #: [id number]  Date of Service: 6/26/2025    Chief Complaint:  Routine 3 month follow up with imaging review    Diagnosis:   Encounter Diagnosis   Name Primary?    Malignant neoplasm of upper lobe of right lung Yes          Radiation Treatment Course     Treating Physician: Dr. Ivan Dasilva     Start: 10/14/2024     End: 11/22/2024   Site: Right Upper lobe    Total Dose: 6000 cGy    Dose/Fraction: 200 cGy    Total Fractions: 30 Daily or BID: Daily  Modality: Photon  Technique: IMRT/VMAT/Rapid Arc  Bolus: No        Interval History       Laura Higginbotham is a 65 y.o. female female who was diagnosed with stage IIIb (cT4 cN2 cM0) right upper lobe lung adenocarcinoma in August 2024. She completed definitive chemoradiation with 3 cycles cisplatin/pemetrexed and 6000 cGy in 30 fractions on 12/2/2024. Immunotherapy with durvalumab was initiated on 2/4/2025. She developed worsening mid back pain in February 2025. CT CAP showed a new mild superior endplate compression fracture at T2 and unchanged mild T4 compression fracture. She was most recently seen in our office on 4/14/2025 with plans for repeat CT CAP. She presents today for follow up and imaging review.     The patient reports her pain is stable.  It is neither better nor worse.  She still feels pain in her back.  Her breathing is relatively stable.  She is having some issues with her port and tenderness around the port.  She plans to talk to medical oncology about this at their next appointment.  She is here to review recent imaging.        Imaging       6/23/2025 CT Chest, Abdomen, and Pelvis imaging reports not available    CT Limited Localized Follow Up Study  Result Date: 4/9/2025  Impression: Since the lesion identified on MR is not visualized on either CT or PET/CT, the biopsy was canceled. Suggest imaging surveillance. Electronically Signed: Russell Silva  MD  4/9/2025 3:57 PM EDT  Workstation ID: [id number]    NM PET/CT Skull Base to Mid Thigh  Result Date: 3/14/2025  Impression: 1. Interval treatment response with continued decrease in size and activity of right upper lobe mass with cavitation, as well as extension into the right posterior third rib 2. The lesion seen on recent MRI do not have a CT, or PET correlate, but do remain suspicious for metastatic disease 3. Asymmetric focal uptake within the right lobe of the thyroid, without correlate on CT. Finding is nonspecific, consider correlation with thyroid ultrasound to exclude nodule. Electronically Signed: Bishun Jefferson MD  3/14/2025 9:05 AM EDT  Workstation ID: [id number]    MRI Thoracic Spine With & Without Contrast  Result Date: 3/5/2025  1. Subacute appearing compression deformity of the superior endplate of T2. Chronic mild compression deformity of the superior plate of T4. No indication of bony retropulsion, subluxation, or canal compromise. 2. Findings suspicious for metastatic disease involving T2, T3, T5, and questionably T7 vertebrae. Malignant involvement of the right third rib. 3. Right upper lobe lung mass with contiguous extension to the right hilum and pleural malignant involvement. Electronically Signed: Megan Mcgill MD  3/5/2025 8:34 AM EST  Workstation ID: [id number]             Pathology       No new pathology to review.        Labs       Hematology:  WBC   Date Value Ref Range Status   06/24/2025 5.87 3.40 - 10.80 10*3/mm3 Final     RBC   Date Value Ref Range Status   06/24/2025 4.39 3.77 - 5.28 10*6/mm3 Final     Hemoglobin   Date Value Ref Range Status   06/24/2025 12.9 12.0 - 15.9 g/dL Final     Hematocrit   Date Value Ref Range Status   06/24/2025 39.8 34.0 - 46.6 % Final     Platelets   Date Value Ref Range Status   06/24/2025 289 140 - 450 10*3/mm3 Final     Chemistry:  Lab Results   Component Value Date    GLUCOSE 141 (H) 06/24/2025    BUN 10.2 06/24/2025    CREATININE 0.82 06/24/2025     BCR 12.4 06/24/2025    K 4.3 06/24/2025    CO2 23.3 06/24/2025    CALCIUM 9.4 06/24/2025    ALBUMIN 4.0 06/24/2025    AST 14 06/24/2025    ALT 13 06/24/2025         Problem List       Patient Active Problem List   Diagnosis    Malignant neoplasm of upper lobe of right lung    Moderate major depression, single episode    Organic anxiety disorder    Polyneuropathy    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    Chemotherapy-induced nausea    Choking    Encounter for therapeutic drug monitoring    Encounter for immunotherapy    Encounter for long-term (current) use of high-risk medication    Acute bilateral thoracic back pain    Primary adenocarcinoma of upper lobe of right lung    Neoplasm related pain    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          Current Medications       Current Outpatient Medications   Medication Instructions    albuterol sulfate  (90 Base) MCG/ACT inhaler 1 puff, Every 4 Hours PRN    Breztri Aerosphere 160-9-4.8 MCG/ACT aerosol inhaler 2 puffs, 2 Times Daily    famotidine (PEPCID) 20 mg, Oral, 2 Times Daily    gabapentin (NEURONTIN) 300 mg, Oral, 4 Times Daily, As instructed: 1 capsule in AM, 1 capsule in afternoon, 2 capsules 1 hour before bed    Glucose Blood (Blood Glucose Test Strips 333) strip Check sugar twice a day and PRN and keep a log In Vitro    lidocaine-prilocaine (EMLA) 2.5-2.5 % cream 1 Application, Topical, Every 2 Hours PRN, To port site before chemo/blood draw    metFORMIN (GLUCOPHAGE) 1000 MG tablet Every 12 Hours Scheduled    naloxone (NARCAN) 4 MG/0.1ML nasal spray Call 911. Don't prime. [place] in 1 nostril for overdose. Repeat in 2-3 minutes in other nostril if no or minimal breathing/responsiveness.    ondansetron ODT (ZOFRAN-ODT) 8 mg, Oral, Every 8 Hours PRN    oxyCODONE (ROXICODONE) 10 mg, Oral, Every 6 Hours PRN    prochlorperazine (COMPAZINE) 10 mg, Oral, Every 6 Hours PRN    sennosides-docusate (senna-docusate sodium) 8.6-50 MG  per tablet 2 tablets, Oral, Daily          Allergies       No Known Allergies        Review of Systems         Vitals:    06/26/25 0857   BP: 134/90   Pulse: 109   Resp: 18   SpO2: 98%      Physical Exam  Constitutional:       General: She is not in acute distress.  HENT:      Head: Normocephalic and atraumatic.   Pulmonary:      Effort: Pulmonary effort is normal. No respiratory distress.   Neurological:      Mental Status: She is alert and oriented to person, place, and time. Mental status is at baseline.   Psychiatric:         Mood and Affect: Mood normal.         Behavior: Behavior normal.          ECOG:  Restricted in physically strenuous activity but ambulatory and able to carry out work of a light or sedentary nature, e.g., light house work, office work = 1        Assessment and Plan       Assessment:        Laura Higginbotham is a 65 y.o. female female who was diagnosed with stage IIIb (cT4 cN2 cM0) right upper lobe lung adenocarcinoma in August 2024. She completed definitive chemoradiation with 3 cycles cisplatin/pemetrexed and 6000 cGy in 30 fractions on 12/2/2024. Immunotherapy with durvalumab was initiated on 2/4/2025. She developed worsening mid back pain in February 2025. CT CAP showed a new mild superior endplate compression fracture at T2 and unchanged mild T4 compression fracture. She was most recently seen in our office on 4/14/2025 with plans for repeat MRI. She presents today for follow up and imaging review    Diagnoses and all orders for this visit:    1. Malignant neoplasm of upper lobe of right lung (Primary)  -     CT Chest With Contrast Diagnostic; Future  -     CT Abdomen Pelvis With Contrast; Future       Plan:      Orders:  - Wait for final imaging report, concern for potential progression of compression fractures in the T spine  - Plan for repeat imaging in 3 months    We reviewed the patient's recent imaging. Her imaging report is not yet available. On personal review, the patient has some  inflammatory changes in the right lung. These appear more inflammatory/infectious more than malignant. Patient is asymptomatic so we discussed observation with short interval imaging. Patient appears to have progression of compression fractures in the T-spine. We discussed the plan to wait for the CT report to further assess. Depending on the ultimate findings, we discussed plans for either short interval repeat imaging or additional work-up and referrals. Patient agreed with the plan. She is encouraged to reach out with questions or concerns prior to her next appointment.     I spent 30 minutes caring for Laura on this date of service. This time includes time spent by me in the following activities:preparing for the visit, reviewing tests, obtaining and/or reviewing a separately obtained history, performing a medically appropriate examination and/or evaluation , counseling and educating the patient/family/caregiver, ordering medications, tests, or procedures, documenting information in the medical record, and independently interpreting results and communicating that information with the patient/family/caregiver        Follow-Up       No follow-ups on file.       CC: Laura Archer, SHERIN

## 2025-06-24 ENCOUNTER — HOSPITAL ENCOUNTER (OUTPATIENT)
Dept: ONCOLOGY | Facility: HOSPITAL | Age: 65
Discharge: HOME | End: 2025-06-24
Admitting: INTERNAL MEDICINE
Payer: MEDICARE

## 2025-06-24 VITALS
OXYGEN SATURATION: 97 % | WEIGHT: 164 LBS | TEMPERATURE: 97.6 F | DIASTOLIC BLOOD PRESSURE: 73 MMHG | HEART RATE: 91 BPM | SYSTOLIC BLOOD PRESSURE: 117 MMHG | BODY MASS INDEX: 24.94 KG/M2 | RESPIRATION RATE: 16 BRPM

## 2025-06-24 DIAGNOSIS — C77.1 NON-SMALL CELL LUNG CANCER METASTATIC TO INTRATHORACIC LYMPH NODE: Primary | ICD-10-CM

## 2025-06-24 DIAGNOSIS — C34.90 NON-SMALL CELL LUNG CANCER METASTATIC TO BONE: ICD-10-CM

## 2025-06-24 DIAGNOSIS — C79.51 NON-SMALL CELL LUNG CANCER METASTATIC TO BONE: ICD-10-CM

## 2025-06-24 DIAGNOSIS — C34.90 NON-SMALL CELL LUNG CANCER METASTATIC TO INTRATHORACIC LYMPH NODE: Primary | ICD-10-CM

## 2025-06-24 DIAGNOSIS — C34.11 MALIGNANT NEOPLASM OF UPPER LOBE OF RIGHT LUNG: ICD-10-CM

## 2025-06-24 DIAGNOSIS — C34.11 PRIMARY ADENOCARCINOMA OF UPPER LOBE OF RIGHT LUNG: ICD-10-CM

## 2025-06-24 LAB
ALBUMIN SERPL-MCNC: 4 G/DL (ref 3.5–5.2)
ALBUMIN/GLOB SERPL: 1.3 G/DL
ALP SERPL-CCNC: 93 U/L (ref 39–117)
ALT SERPL W P-5'-P-CCNC: 13 U/L (ref 1–33)
ANION GAP SERPL CALCULATED.3IONS-SCNC: 11.7 MMOL/L (ref 5–15)
AST SERPL-CCNC: 14 U/L (ref 1–32)
BASOPHILS # BLD AUTO: 0.04 10*3/MM3 (ref 0–0.2)
BASOPHILS NFR BLD AUTO: 0.7 % (ref 0–1.5)
BILIRUB SERPL-MCNC: 0.3 MG/DL (ref 0–1.2)
BUN SERPL-MCNC: 10.2 MG/DL (ref 8–23)
BUN/CREAT SERPL: 12.4 (ref 7–25)
CALCIUM SPEC-SCNC: 9.4 MG/DL (ref 8.6–10.5)
CHLORIDE SERPL-SCNC: 104 MMOL/L (ref 98–107)
CO2 SERPL-SCNC: 23.3 MMOL/L (ref 22–29)
CREAT SERPL-MCNC: 0.82 MG/DL (ref 0.57–1)
DEPRECATED RDW RBC AUTO: 47.2 FL (ref 37–54)
EGFRCR SERPLBLD CKD-EPI 2021: 79.5 ML/MIN/1.73
EOSINOPHIL # BLD AUTO: 0.06 10*3/MM3 (ref 0–0.4)
EOSINOPHIL NFR BLD AUTO: 1 % (ref 0.3–6.2)
ERYTHROCYTE [DISTWIDTH] IN BLOOD BY AUTOMATED COUNT: 14.5 % (ref 12.3–15.4)
GLOBULIN UR ELPH-MCNC: 3.1 GM/DL
GLUCOSE SERPL-MCNC: 141 MG/DL (ref 65–99)
HCT VFR BLD AUTO: 39.8 % (ref 34–46.6)
HGB BLD-MCNC: 12.9 G/DL (ref 12–15.9)
LYMPHOCYTES # BLD AUTO: 1.14 10*3/MM3 (ref 0.7–3.1)
LYMPHOCYTES NFR BLD AUTO: 19.4 % (ref 19.6–45.3)
MCH RBC QN AUTO: 29.4 PG (ref 26.6–33)
MCHC RBC AUTO-ENTMCNC: 32.4 G/DL (ref 31.5–35.7)
MCV RBC AUTO: 90.7 FL (ref 79–97)
MONOCYTES # BLD AUTO: 0.45 10*3/MM3 (ref 0.1–0.9)
MONOCYTES NFR BLD AUTO: 7.7 % (ref 5–12)
NEUTROPHILS NFR BLD AUTO: 4.18 10*3/MM3 (ref 1.7–7)
NEUTROPHILS NFR BLD AUTO: 71.2 % (ref 42.7–76)
PLATELET # BLD AUTO: 289 10*3/MM3 (ref 140–450)
PMV BLD AUTO: 8.2 FL (ref 6–12)
POTASSIUM SERPL-SCNC: 4.3 MMOL/L (ref 3.5–5.2)
PROT SERPL-MCNC: 7.1 G/DL (ref 6–8.5)
RBC # BLD AUTO: 4.39 10*6/MM3 (ref 3.77–5.28)
SODIUM SERPL-SCNC: 139 MMOL/L (ref 136–145)
T4 FREE SERPL-MCNC: 0.82 NG/DL (ref 0.92–1.68)
TSH SERPL DL<=0.05 MIU/L-ACNC: 4.47 UIU/ML (ref 0.27–4.2)
WBC NRBC COR # BLD AUTO: 5.87 10*3/MM3 (ref 3.4–10.8)

## 2025-06-24 PROCEDURE — 25810000003 SODIUM CHLORIDE 0.9 % SOLUTION 250 ML FLEX CONT: Performed by: INTERNAL MEDICINE

## 2025-06-24 PROCEDURE — 25010000002 HEPARIN LOCK FLUSH PER 10 UNITS: Performed by: INTERNAL MEDICINE

## 2025-06-24 PROCEDURE — 36593 DECLOT VASCULAR DEVICE: CPT

## 2025-06-24 PROCEDURE — 80053 COMPREHEN METABOLIC PANEL: CPT | Performed by: INTERNAL MEDICINE

## 2025-06-24 PROCEDURE — 85025 COMPLETE CBC W/AUTO DIFF WBC: CPT | Performed by: INTERNAL MEDICINE

## 2025-06-24 PROCEDURE — 25010000002 DURVALUMAB 50 MG/ML SOLUTION 10 ML VIAL: Performed by: INTERNAL MEDICINE

## 2025-06-24 PROCEDURE — 96372 THER/PROPH/DIAG INJ SC/IM: CPT

## 2025-06-24 PROCEDURE — 25010000002 DENOSUMAB 120 MG/1.7ML SOLUTION: Performed by: INTERNAL MEDICINE

## 2025-06-24 PROCEDURE — 96413 CHEMO IV INFUSION 1 HR: CPT

## 2025-06-24 PROCEDURE — 84443 ASSAY THYROID STIM HORMONE: CPT | Performed by: INTERNAL MEDICINE

## 2025-06-24 PROCEDURE — 84439 ASSAY OF FREE THYROXINE: CPT | Performed by: INTERNAL MEDICINE

## 2025-06-24 PROCEDURE — 25010000002 ALTEPLASE 2 MG RECONSTITUTED SOLUTION: Performed by: NURSE PRACTITIONER

## 2025-06-24 RX ORDER — HEPARIN SODIUM (PORCINE) LOCK FLUSH IV SOLN 100 UNIT/ML 100 UNIT/ML
500 SOLUTION INTRAVENOUS AS NEEDED
OUTPATIENT
Start: 2025-06-24

## 2025-06-24 RX ORDER — SODIUM CHLORIDE 9 MG/ML
20 INJECTION, SOLUTION INTRAVENOUS ONCE
Status: DISCONTINUED | OUTPATIENT
Start: 2025-06-24 | End: 2025-06-25 | Stop reason: HOSPADM

## 2025-06-24 RX ORDER — SODIUM CHLORIDE 0.9 % (FLUSH) 0.9 %
10 SYRINGE (ML) INJECTION AS NEEDED
OUTPATIENT
Start: 2025-06-24

## 2025-06-24 RX ORDER — HEPARIN SODIUM (PORCINE) LOCK FLUSH IV SOLN 100 UNIT/ML 100 UNIT/ML
500 SOLUTION INTRAVENOUS AS NEEDED
Status: DISCONTINUED | OUTPATIENT
Start: 2025-06-24 | End: 2025-06-25 | Stop reason: HOSPADM

## 2025-06-24 RX ORDER — SODIUM CHLORIDE 0.9 % (FLUSH) 0.9 %
10 SYRINGE (ML) INJECTION AS NEEDED
Status: DISCONTINUED | OUTPATIENT
Start: 2025-06-24 | End: 2025-06-25 | Stop reason: HOSPADM

## 2025-06-24 RX ADMIN — ALTEPLASE: 2.2 INJECTION, POWDER, LYOPHILIZED, FOR SOLUTION INTRAVENOUS at 13:17

## 2025-06-24 RX ADMIN — Medication 10 ML: at 15:39

## 2025-06-24 RX ADMIN — Medication 500 UNITS: at 15:39

## 2025-06-24 RX ADMIN — SODIUM CHLORIDE 1500 MG: 9 INJECTION, SOLUTION INTRAVENOUS at 14:30

## 2025-06-24 RX ADMIN — DENOSUMAB 120 MG: 120 INJECTION SUBCUTANEOUS at 15:40

## 2025-06-24 NOTE — PROGRESS NOTES
Pt here for Imfinzi c6. [facility] infusaport accessed. No blood return. Flushes without difficulty. Pt stated Activase given last cycle. Activase given per orders. After 30mins, positive blood return. Treatment given as directed. AVS printed.

## 2025-06-26 ENCOUNTER — OFFICE VISIT (OUTPATIENT)
Dept: RADIATION ONCOLOGY | Facility: HOSPITAL | Age: 65
End: 2025-06-26
Payer: MEDICARE

## 2025-06-26 VITALS
WEIGHT: 166 LBS | HEART RATE: 109 BPM | BODY MASS INDEX: 25.25 KG/M2 | RESPIRATION RATE: 18 BRPM | OXYGEN SATURATION: 98 % | SYSTOLIC BLOOD PRESSURE: 134 MMHG | DIASTOLIC BLOOD PRESSURE: 90 MMHG

## 2025-06-26 DIAGNOSIS — C34.11 MALIGNANT NEOPLASM OF UPPER LOBE OF RIGHT LUNG: Primary | ICD-10-CM

## 2025-06-26 PROCEDURE — G0463 HOSPITAL OUTPT CLINIC VISIT: HCPCS | Performed by: INTERNAL MEDICINE

## 2025-06-30 ENCOUNTER — DOCUMENTATION (OUTPATIENT)
Dept: RADIATION ONCOLOGY | Facility: HOSPITAL | Age: 65
End: 2025-06-30
Payer: MEDICARE

## 2025-06-30 DIAGNOSIS — R91.8 LUNG NODULES: ICD-10-CM

## 2025-06-30 DIAGNOSIS — C34.11 PRIMARY ADENOCARCINOMA OF UPPER LOBE OF RIGHT LUNG: Primary | ICD-10-CM

## 2025-06-30 RX ORDER — AZITHROMYCIN 250 MG/1
TABLET, FILM COATED ORAL
Qty: 11 TABLET | Refills: 0 | Status: SHIPPED | OUTPATIENT
Start: 2025-06-30 | End: 2025-07-10

## 2025-06-30 NOTE — PROGRESS NOTES
We reviewed the patient's recent CT imaging findings.  We discussed the concern for 2 nodules noted in the left upper lobe lung.  We discussed plans for pet imaging to further work these lesions up.  We discussed the possible need for subsequent biopsy versus empiric treatment.  The patient also reported she has experienced increased coughing symptoms and a productive cough.  Based on her images and this change in cough symptoms, we prescribed her antibiotic therapy for potential pneumonia.  Depending on treatment response, we may also need to consider a steroid taper for potential radiation pneumonitis if her symptoms do not improve.  Patient expressed understanding.  She is encouraged to reach out with any questions or concerns.

## 2025-07-10 ENCOUNTER — HOSPITAL ENCOUNTER (OUTPATIENT)
Dept: PET IMAGING | Facility: HOSPITAL | Age: 65
Discharge: HOME | End: 2025-07-10
Payer: MEDICARE

## 2025-07-10 DIAGNOSIS — R91.8 LUNG NODULES: ICD-10-CM

## 2025-07-10 DIAGNOSIS — C34.11 PRIMARY ADENOCARCINOMA OF UPPER LOBE OF RIGHT LUNG: ICD-10-CM

## 2025-07-10 LAB — GLUCOSE BLDC GLUCOMTR-MCNC: 128 MG/DL (ref 70–105)

## 2025-07-10 PROCEDURE — A9552 F18 FDG: HCPCS | Performed by: INTERNAL MEDICINE

## 2025-07-10 PROCEDURE — 78815 PET IMAGE W/CT SKULL-THIGH: CPT

## 2025-07-10 PROCEDURE — 34310000005 FLUDEOXYGLUCOSE F18 SOLUTION: Performed by: INTERNAL MEDICINE

## 2025-07-10 PROCEDURE — 82948 REAGENT STRIP/BLOOD GLUCOSE: CPT

## 2025-07-10 RX ADMIN — FLUDEOXYGLUCOSE F 18 1 DOSE: 200 INJECTION, SOLUTION INTRAVENOUS at 10:49

## 2025-07-16 DIAGNOSIS — C34.11 MALIGNANT NEOPLASM OF UPPER LOBE OF RIGHT LUNG: ICD-10-CM

## 2025-07-16 DIAGNOSIS — C34.90 NON-SMALL CELL LUNG CANCER METASTATIC TO BONE: ICD-10-CM

## 2025-07-16 DIAGNOSIS — C79.51 NON-SMALL CELL LUNG CANCER METASTATIC TO BONE: ICD-10-CM

## 2025-07-16 RX ORDER — OXYCODONE HYDROCHLORIDE 10 MG/1
10 TABLET ORAL EVERY 6 HOURS PRN
Qty: 120 TABLET | Refills: 0 | Status: SHIPPED | OUTPATIENT
Start: 2025-07-17

## 2025-07-18 NOTE — PROGRESS NOTES
[facility] RADIATION ONCOLOGY  FOLLOW-UP    Name: Laura Higginbotham  YOB: 1960  MRN #: [id number]  Date of Service: 7/22/2025    Chief Complaint:  Follow up after recent imaging     Diagnosis:   Encounter Diagnoses   Name Primary?    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 Ivan Dasilva     Start: 10/14/2024     End: 11/22/2024   Site: Right Upper lobe    Total Dose: 6000 cGy    Dose/Fraction: 200 cGy    Total Fractions: 30 Daily or BID: Daily  Modality: Photon  Technique: IMRT/VMAT/Rapid Arc  Bolus: No      Interval History       Laura Higginbotham is a 65 y.o. female who was diagnosed with stage IIIb (cT4 cN2 cM0) right upper lobe lung adenocarcinoma in August 2024. She completed definitive chemoradiation with 3 cycles cisplatin/pemetrexed and 6000 cGy in 30 fractions on 12/2/2024. Immunotherapy with durvalumab was initiated on 2/4/2025. She developed worsening mid back pain in February 2025. CT CAP showed a new mild superior endplate compression fracture at T2 and unchanged mild T4 compression fracture. CT CAP from 6/23/2025 showed new 13mm CATHERINE pulmonary nodule, suggesting metastases. Further imaging was ordered and PET/CT was completed on 7/15/2025. Laura presents today for follow up with review of recent imaging.       The patient reports she has persistent back pain but it is stable. Her cough seems to be worse recently. She has no other new symptoms.       Imaging   NM PET/CT Skull Base to Mid Thigh  Result Date: 7/15/2025  Impression: 1. New left upper lobe nodules reported on 6/23/2025 are hypermetabolic suspicious for metastatic disease. 2. Right upper lobe mass shows persistent but decreasing hypermetabolic activity along the lateral margin suggesting combination of posttreatment change and  decreasing viable tumor. Findings suggest partial treatment response as compared to 3/10/2025 PET/CT. 3. Patchy right lower lobe opacities are mildly hypermetabolic probably infectious/inflammatory based on morphology. 4. No suspicious FDG avid adenopathy or extrathoracic metastases. 5. Mild hypermetabolic activity associated with the T5 and T6 compression fractures suggesting recent (acute/subacute) injuries. No measurable underlying lesion by PET/CT. Findings could relate to trauma or insufficiency fracture in the setting of demineralization.     CT CAP With Contrast Diagnostic  Result Date: 6/27/2025  Impression: CHEST: 1. No change right upper lobe mass with chest wall involvement and sclerosis of right posterior third rib. 2. New 13 mm left upper lobe pulmonary nodule may relate to pulmonary metastasis with 7 mm peribronchovascular lymph node versus nodule in the left upper lobe more centrally. 3. New patchy groundglass opacities in right upper lobe and superior segment right lower lobe may relate to infectious or inflammatory etiology or pneumonitis related to prior radiation therapy. 4. New mild compression fractures at T5 and T6. Compression fractures at T2 to and T4 unchanged. No retropulsed fracture fragment. 5. Additional chronic findings above. ABDOMEN AND PELVIS: 1. No findings of metastatic disease in the abdomen or pelvis. 2. Hepatomegaly and hepatic steatosis. 3. Additional chronic findings above. Electronically Signed: Jostin Albert MD  6/27/2025 2:39 PM EDT  Workstation ID: [id number]           Pathology       No new pathology to review.        Labs       Hematology:  WBC   Date Value Ref Range Status   07/22/2025 5.46 3.40 - 10.80 10*3/mm3 Final     RBC   Date Value Ref Range Status   07/22/2025 4.16 3.77 - 5.28 10*6/mm3 Final     Hemoglobin   Date Value Ref Range Status   07/22/2025 12.0 12.0 - 15.9 g/dL Final     Hematocrit   Date Value Ref Range Status   07/22/2025 36.8 34.0 - 46.6 % Final      Platelets   Date Value Ref Range Status   07/22/2025 262 140 - 450 10*3/mm3 Final     Chemistry:  Lab Results   Component Value Date    GLUCOSE 155 (H) 07/22/2025    BUN 10.3 07/22/2025    CREATININE 0.84 07/22/2025    BCR 12.3 07/22/2025    K 4.0 07/22/2025    CO2 25.7 07/22/2025    CALCIUM 9.7 07/22/2025    ALBUMIN 4.0 07/22/2025    AST 12 07/22/2025    ALT 10 07/22/2025         Problem List       Patient Active Problem List   Diagnosis    Malignant neoplasm of upper lobe of right lung    Moderate major depression, single episode    Organic anxiety disorder    Polyneuropathy    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    Chemotherapy-induced nausea    Choking    Encounter for therapeutic drug monitoring    Encounter for immunotherapy    Encounter for long-term (current) use of high-risk medication    Acute bilateral thoracic back pain    Primary adenocarcinoma of upper lobe of right lung    Neoplasm related pain    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          Current Medications       Current Outpatient Medications   Medication Instructions    albuterol sulfate  (90 Base) MCG/ACT inhaler 1 puff, Every 4 Hours PRN    Breztri Aerosphere 160-9-4.8 MCG/ACT aerosol inhaler 2 puffs, 2 Times Daily    famotidine (PEPCID) 20 mg, Oral, 2 Times Daily    gabapentin (NEURONTIN) 300 mg, Oral, 4 Times Daily, As instructed: 1 capsule in AM, 1 capsule in afternoon, 2 capsules 1 hour before bed    Glucose Blood (Blood Glucose Test Strips 333) strip Check sugar twice a day and PRN and keep a log In Vitro    lidocaine-prilocaine (EMLA) 2.5-2.5 % cream 1 Application, Topical, Every 2 Hours PRN, To port site before chemo/blood draw    metFORMIN (GLUCOPHAGE) 1000 MG tablet Every 12 Hours Scheduled    naloxone (NARCAN) 4 MG/0.1ML nasal spray Call 911. Don't prime. Everly in 1 nostril for overdose. Repeat in 2-3 minutes in other nostril if no or minimal breathing/responsiveness.    ondansetron  ODT (ZOFRAN-ODT) 8 mg, Oral, Every 8 Hours PRN    oxyCODONE (ROXICODONE) 10 mg, Oral, Every 6 Hours PRN    prochlorperazine (COMPAZINE) 10 mg, Oral, Every 6 Hours PRN    sennosides-docusate (senna-docusate sodium) 8.6-50 MG per tablet 2 tablets, Oral, Daily          Allergies       No Known Allergies        Review of Systems         Vitals:    07/22/25 1006   BP: 157/78   Pulse: 106   Resp: 18   SpO2: 96%      Physical Exam  Constitutional:       General: She is not in acute distress.  HENT:      Head: Normocephalic and atraumatic.   Pulmonary:      Effort: Pulmonary effort is normal. No respiratory distress.   Neurological:      Mental Status: She is alert and oriented to person, place, and time. Mental status is at baseline.   Psychiatric:         Mood and Affect: Mood normal.         Behavior: Behavior normal.          ECOG:  Restricted in physically strenuous activity but ambulatory and able to carry out work of a light or sedentary nature, e.g., light house work, office work = 1          Assessment and Plan       Assessment:        Laura Higginbotham is a 65 y.o. female who was diagnosed with stage IIIb (cT4 cN2 cM0) right upper lobe lung adenocarcinoma in August 2024. She completed definitive chemoradiation with 3 cycles cisplatin/pemetrexed and 6000 cGy in 30 fractions on 12/2/2024. Immunotherapy with durvalumab was initiated on 2/4/2025. She developed worsening mid back pain in February 2025. CT CAP showed a new mild superior endplate compression fracture at T2 and unchanged mild T4 compression fracture. CT CAP from 6/23/2025 showed new 13mm CATHERINE pulmonary nodule, suggesting metastases. Further imaging was ordered and PET/CT was completed on 7/15/2025. Laura presents today for follow up with review of recent imaging.     Diagnoses and all orders for this visit:    1. Malignant neoplasm of upper lobe of right lung (Primary)    2. 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 Non-small cell lung cancer metastatic  to intrathoracic lymph node       Plan:      Orders:  - Ion guided bronchoscopy with Dr. Pendleton.   - Follow-up after bronchoscopy to review treatment recommendations    We reviewed the patient's recent imaging. We discussed the improvement in the RUL lung tumor but new lesions in the left lung. We discussed these lesions showed PET avidity. We discussed the need for biopsy to determine if the lesions are malignant. We discussed potential treatment options if she has disease in her left lung including radiation therapy. The patient expressed understanding. We will refer to Thoracic Surgery for biopsy. Patient encouraged to reach out with questions or concerns prior to her next appointment.     I spent 30 minutes caring for Laura on this date of service. This time includes time spent by me in the following activities:preparing for the visit, reviewing tests, obtaining and/or reviewing a separately obtained history, performing a medically appropriate examination and/or evaluation , counseling and educating the patient/family/caregiver, ordering medications, tests, or procedures, referring and communicating with other health care professionals , documenting information in the medical record, and independently interpreting results and communicating that information with the patient/family/caregiver        Follow-Up       No follow-ups on file.       CC: Laura Archer, SHERIN

## 2025-07-20 NOTE — PROGRESS NOTES
HEMATOLOGY ONCOLOGY OUTPATIENT FOLLOW UP       Patient name: Laura Higginbotham  : 1960  MRN: [id number]  Primary Care Physician: Laura Archer APRN  Referring Physician: Laura Archer APRN  Reason For Consult: right hilar adenocarcinoma of the lung      History of Present Illness:  Patient presented as a 64 y.o. PMH of smoking, skin cancer, arthritis, GERD who presents for newly diagnosed lung cancer.    -2024 Pt went to Dr Archer for increased nonproductive coughing and feeling more dysnpeic on exertion. Was dx with pleurisy and given prednisone which helped somewhat.  -2024 Since cough hadn't completely gone away in 6 weeks, patient went back to Dr. Archer who became concerned and ordered a CXR and antibiotics.  -CXR showed a right hilar mass, CT was ordered.    -2024 CT chest at [facility]: Revealed a very large right hilar mass measuring 9.2 x 9.5 cm in diameter with hypodensity centrally suggesting a necrotic center.  There is obstruction of the right upper lobe bronchus with the infiltrate seen extending peripheral to that area and marked elevation of the right hilum.  Mass extends into the right hilar lymph nodes.    Patient was seen in consultation by Dr. Crystal Powell, pulmonology. She denied having a cough, hemoptysis, wheezing, dyspnea, fever, unintentional weight loss.    -2020 for EBUS bronchoscopy by Dr. Crystal Powell with FNA to the right hilar node 10R and to the right hilar mass revealing inés revealing malignant cells favoring non-small cell.    Pathology showed in the right hilar mass IHC positive for CK7, TTF-1, and Napsin, negative for p63 and CK/6 consistent with pulmonary adenocarcinoma negative for CD56, chromogranin, synaptophysin excluding neuroendocrine component.  Per discussion with pathology: Passes from the right hilar mass were found to be acellular, viable tissue was from the lymph node biopsied and 1 cassette is available for future testing.  -Pneumocystis  was negative,  respiratory culture showing rare growth of normal respiratory alexi with no S. aureus or Pseudomonas aeruginosa detected.,  Respiratory panel PCR negative, PRELIM: no acid-fast bacilli seen on concentrated smear at 1 week, No isolated fungus at 1 week,.  -8/30/2024 CBC showed WBC 11.53, hemoglobin 13.1, hematocrit 40.7 with essentially normal indices, platelets 332K    -9/12/2024 Patient presented for initial consultation with her niece reporting constant nonproductive cough, she still denies having hemoptysis, wheezing, dyspnea, fever, or unintentional weight loss, new bony pains, no new H/A or focal neuro deficit except right under arm new tingling/burning.  She has a history of smoking 1 PPD- started at 17, this summer cut down to 1/2 PPD mostly because of the coughing (47+ years).    -9/20/2024 MRI brain with without contrast with no acute intracranial process identified, findings suggestive of minimal chronic small vessel ischemic disease but no evidence of intracranial metastasis    -9/23/2025 Caris shows PD-L1 positive with TPS 80% by PDL1 test 22C3, with level 1 evidence for cemiplimab and pembrolizumab, positive by PD-L1 28-8 with level 1 evidence for nivolumab/ipilimumab combination, positive by PD-L1  with TC 1+, 50% with atezolizumab in the metastatic setting level 1 evidence, and PD-L1 positive and  positive TC 1+60% with a benefit of atezolizumab in the adjuvant setting, and cemiplimab level 1 evidence.  -ALK negative/fusion not detected by RNA in the tumor, BRAF mutation not detected, EGFR mutation not detected, MET variant transcript not detected, RET fusion not detected, ROS1 fusion not detected.  Tumor mutation burden was high at 18, microsatellite stable.  KRAS pG12V found and 61%, TP53 rN691N found in 56%, NFKBIA was amplified    -9/25/2024 NM PET/CT skull initial staging: Heterogeneous right upper lobe lung mass extending into the superior right lower lobe, encasing the  right mainstem bronchus, encasing and narrowing the right upper lobe bronchus, is hypermetabolic (mSUV 8.76), consistent with malignancy. It has heterogeneous areas of photopenia, suggesting internal necrosis. Pre-carinal lymph node measuring 14 mm short axis is FDG avid (mSUV 4.0), consistent with malignancy.  No hybrid avidity in the neck, abdomen, pelvis, or skeleton. No left hilar adenopathy. No suspicious left lung nodules. Normal heart size with coronary calcifications. Benign calcified mediastinal and left hilar lymph nodes are present.    -10/2/2024 f/u w/ niece reporting better nonproductive cough and rib pain since being oxycodone low dose. Got SIMMED yesterday for radiation. She has a history of smoking 1 PPD- started at 17, still on 1/2 PPD - gum stuck to dentures.    -10/3/2024 B12 1000 mcg given IM  -10/10 s/p Mediport placement    -10/14/2024 started chemoradiation (cisplatin +pemetrexed) reporting still with nonproductive cough and rib pain since being oxycodone low dose. Picked up her nausea medications, got her port,  her Emla cream (ports still a little tender), started her folate soon as she got it.  Did get her chemo teach and got her B12 shot as well she is ready to go.    -11/4/2024 Follow up for C2 chemoradiation reporting nonproductive cough and rib pain both better since being oxycodone low dose, using stool softener and yogurt which has greatly helped with stooling appropriately (EOD), patient had a lot of nausea the first 9 days, needing the zofran Q8H that time, a lot of fatigue.  -11/22/2024.  Patient completed radiation therapy to a total dose of 60 Gray (2 Gray per fraction with total of 30 fractions (under Dr. Ivan Dasilva).    -12/2/2024 Follow up for C3 chemotherapy, completed radiation last week.  She is reporting more productive cough (mostly clear and thick occ some green); reports when she is drinking warm liquids or cold liquids he is still having pain when she tries  to swallow the temperature seems to go down her throat where she chokes a little bit and then seems to radiate over to the upper right side of her chest this is relatively new.  Denies having any fevers, chills, pleuritic chest pain, shortness of breath in fact breathing seems to be easier.  She feels wiped out and rib pain better since being oxycodone low dose, using stool softener and yogurt which has greatly helped with stooling appropriately (EOD), patient still had a lot of nausea the first 9 days, even with addition of Zyprexa, needing the zofran Q8H that time, and admits that she is still taking the dexamethasone as prescribed day before, day of, day after and those are proximal to starting day of.    -Reviewed the report of the CT chest done at [facility] a few days ago shows some improvement of the size of her tumor and no concern of PNA or TE fistula.     -12/19/2024 Patient presents in follow up to ensure she will be ready for C4 chemotherapy on 12/31.  She is reporting the farther she gets from radiation, the more improvement she has in the productive cough (mostly clear and thick occ some green); reports also some improvement in the sensation of when she is drinking warm liquids or cold liquids not having as frequent or as severe pain when she tries to swallow the temperature seems to go down her throat, nor is she choking as much or radiating- over to the upper right side of her chest.  Denies having any fevers, chills, pleuritic chest pain, shortness of breath in fact breathing seems to be easier.  Her energy is a littler better, and rib pain better since completion of radiation;   -medicare finally filling the next oxycodone low dose Rx ordered by Dr Dasilva (also has Narcan)  -using stool softener and yogurt which has greatly helped with stooling appropriately (EOD)  -patient still had a lot of nausea the first 9 days, even with addition of Zyprexa (needs refill for C4), needing the zofran Q8H that  time,   -still taking the dexamethasone as prescribed day before, day of, day after and those are proximal to starting day of (needs refill for C4).    -1/16/2025 Got COVID H/A sore throat, body aches-all better except for back- in the middle, down towards the bottom (never ran fever) Her original productive cough-(mostly clear and thick occ some green); got worse with COVID- still worse, still not better. No shortness of breath. Because of this, has missed her 4th and last dose of chemo. Still not feeling well enough for in person visit today.    2/4/2025 1st imfinzi  -2/13/2025  NM bone scan:Increased uptake in the right third rib posteriorly corresponding to the area of tumor invasion locally in the right upper lobe. No evidence of distant metastatic disease.     -2/24/2025 CT C/A/P w/contrast: Visualized soft tissue of the lower neck demonstrate right-sided port catheter which is coiled within the internal jugular vein with a small amount of fibrin sheath or clot surrounding the catheter. The catheter tip terminates at the junction of the IJ vein and the right subclavian vein, stable prior. Precarinal lymph node decreased in size measuring 8 mm, previously 13 mm. No new mediastinal adenopathy. No pulmonary embolus. The thoracic aortic arch branch vasculature is patent. Within the RUL redemonstration of a mass extending to the right apex with size mildly decreased in the prior study with a representative measurement of 6.8 x 6.5 cm, previously 7.2 x 7.8 cm (4/45) with extension through the pleura to involve the right posterior central third rib as seen on the prior study with increased sclerosis at this site with no new rib involvement. Mass again extends to the right hilum with narrowing of the RUL bronchus and obstruction of the RUL bronchus posterior segment and extends across the major pulmonary fissure to involve the medial superior segment of the RLL. No new or enlarging nodule. Mild superior endplate  "fracture at T4 level unchanged. Mild superior endplate compression fracture at T2 new from the prior study. No retropulsed fracture fragment. Liver is mildly enlarged measuring 20 cm in craniocaudal length. No suspicious liver lesion. The spleen is normal in size. The adrenal glands are normal. Moderate amount of stool in the colon. Small fat-containing paraumbilical hernia. No inguinal adenopathy. No retroperitoneal or central mesenteric adenopathy. No aggressive osseous lesion or acute fracture.     -2/28/2025 MRI T spine done- Subacute appearing compression deformity of the superior endplate of T2. Chronic mild compression deformity of superior plate of T4. No bony retropulsion, subluxation, or canal compromise. Rounded enhancing lesion is seen within the T5 vertebral body left of midline measuring 1.3 x 1.1 cm (series 11 image 22, series 7 image 8), consistent with metastatic disease. Questionable T7 enhancing lesion posteriorly and left of midline measuring 1.2 x 1.2 cm on series 7 image 5, without confident axial correlate, suspicious for metastatic involvement.-findings suspicious for metastatic disease involving T2, T3, as well. 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    -3/4/2025 Pt reports for 2nd imfinzi, her prior COVID s/s (H/A, sore throat, body aches)-still resolved   -Back pain is continued-still different pain from what she had on presentation and still ongoing- in the middle, can radiate down towards the bottom, denies having any popping sensations or crunching feeling. Asking for more pain medication \"Dr Dasilva wouldn't refill it because you did last time.\" Dr Dasilva had scheduled an MRI of the back last week and referred for ?IR kyphoplasty +/- bone biopsy.  -Her original productive cough- (mostly clear and thick occ some green-unchanged); No shortness of breath unless it is a long cough.      -3/10/2025 PET/CT: Increased uptake in the right lobe of the thyroid (may represent a " "thyroid nodule, no nodule is identified on CT scan, mSUV 6). Redemonstration of RUL mass with cavitation with significant decrease in size and FDG activity compared to prior exam (mSUV 12.5), difficult to measure precisely, approximately 3.8 cm in diameter, decreased from prior (4.6 cm). Rim of mild FDG activity surrounding the cavitary portion, which is also decreased in size and activity compared to prior. There remains mild, persistent erosion into the right posterior third rib with mild residual FDG activity (mSUV 3.7). Pretracheal LN remains nonenlarged without FDG activity. No abnormal FDG activity identified to correspond to lesion seen on recent MRI; no abnormal CT appearance to correspond to the lesions on MRI. No osseous lesions in the pelvis. Mild compression deformities of T2 and T4 superior endplates.       -4/1/2025 Pt reports for 3rd imfinzi, her back pain is continued-In the upper middle and the middle, can radiate down towards the bottom and through her leg; still denies having any popping sensations or crunching feeling. Asking for more pain medication as has been out for 5 days; reports was taking as 7.5 mg Q8 H, was only lasting for 5-6 hr and bringing the sharp pain down less than 50%. Had uptitrated the gabapentin to 100mg AM, 200 PM with some relief in the pain and sciatica feeling but still having significant pain.  -Her original productive cough- (mostly clear and thick occ some green) relatively unchanged, still smoking 1/2 PPD \"so I don't ** somebody\"; No shortness of breath unless it is a long cough.      -4/8/2025 CT guided biopsy scheduled; concern the lesion is too high to safely do kyphoplasty.   -4/8/2025 CT guided biopsy scheduled; concern the lesion is too high to safely do kyphoplasty -HOWEVER: No visible bony lesion detected on CT so biopsy and kyphoplasty canceled.     -4/29/2025 Pt reports for 4th imfinzi, her back pain is continued starting to get more control with current " "narcotic prescription through Dr. Dasilva (10 mg 3 times daily with 15 mg prior to bed) as well as the gabapentin that she is using is 300 mg every 8 hours. Still smoking 1/2 PPD \"so I don't ** somebody\"; No shortness of breath unless it is a long cough.    -5/27/2025 Pt reports for 5th imfinzi, her back pain is continued starting to get more control with current narcotic prescription through Dr. Dasilva as well as the gabapentin that she is using is 300 mg every 8 hours; more recently feeling return of numbness/PN in right arm only (had been there previously). Still smoking 1/2 PPD; No shortness of breath unless it is a long cough and the coughing isn't as often, still with clear thick sputum sometimes.     Oncology Treatment  -10/14/2024-11/22/2024 started chemoradiation (cisplatin +pemetrexed)  completed )  -2/4/2025 adjuvant Imfinzi-ONGOING  -5/2025 Xgeva-STARTING 5/27/25    Monitoring Parameters  -Physical examinations  -laboratory testing  -CT scans and PET scans, Brain MRI as clinically indicated      Subjective:  -6/23/2025 CT C/A/P w/contrast: right-sided port catheter with the tip at the upper SVC.    Redemonstration of a mass involving the right upper lobe extending to the right apex with chest wall involvement. Overall appearance is similar to the prior with a representative measurement of 8.1 x 6.5 cm at the right apex (6/23). Sclerosis of the right posterior third rib centrally unchanged (6/14). No new rib fracture. Compression fractures at T2 and T4 similar to the prior study. New compression fractures at T5 and T6. No retropulsed fracture fragment. Suspected thoracic osseous metastasis better assessed on prior MRI. There are several new areas of patchy groundglass opacity involving the anterior right upper lobe and the superior segment of the right lower lobe which could relate to infectious or inflammatory etiology or sequela of prior radiation therapy pneumonitis given the somewhat linear " configuration (6/44, 4/42). Chronic narrowing at the proximal right upper lobe bronchus. Stable narrowing of the proximal RML bronchus and the distal bronchus intermedius. CATHERINE new pulmonary nodule measuring 13 mm (6/47). There is an additional peribronchovascular area of nodularity which may relate to a lymph node in the CATHERINE more centrally which measures 7 mm (6/47).  Hepatomegaly and hepatic steatosis. There is a stable small area of parenchymal enhancement in the left hepatic lobe near the dome which may relate to perfusional variation or flash filling hemangioma measuring 8 mm (6/26). No aggressive osseous lesion or acute fracture.    -7/10/2025 NM PET/CT: Reference uptake values:  Mediastinum: 2.4 SUVmax  Liver: 2.9 SUVmax  Probable physiologic activity in the anterior aspect of the tongue. Partially cavitary or necrotic RUL mass appears grossly similar in size compared to prior PET/Ct with persistent hypermetabolic activity within the anterior lateral aspect decreased from prior PET/CT mSUV 7 (prior mSUV 12.5). Similar asymmetric volume loss, pleural thickening and/or fluid. The previously noted new left upper lobe nodules show hypermetabolic activity, the lateralmost nodule max SUV 3.9 and the more central peribronchovascular nodule SUV of 5.8. Focal patchy areas of consolidation in the right lower lobe appears similar, largest area of consolidation is mildly hypermetabolic max SUV 3.3. No suspicious FDG avid lymph node. No suspicious FDG avid mass or adenopathy in the abdomen/pelvis. Similar enlarged steatotic liver. No suspicious FDG avid bone lesion. Similar chronic deformity of the right posterior third rib shows mild increased FDG activity without measurable lesion, mSUV 2.5; other chronic compression deformities are without significant change. Increased FDG activity associated with the T5 and T6 compression fractures suggesting recent (acute/subacute) injuries. MSUV within the T5 compression fracture is  2.9 without a   discrete underlying bone lesion. Osseous demineralization.  IMPRESSION:  Impression:1. New left upper lobe nodules reported on 6/23/2025 are hypermetabolic suspicious for metastatic disease.  2. Right upper lobe mass shows persistent but decreasing hypermetabolic activity along the lateral margin suggesting combination of posttreatment change and decreasing viable tumor. Findings suggest partial treatment response as compared to 3/10/2025   PET/CT.  3. Patchy right lower lobe opacities are mildly hypermetabolic probably infectious/inflammatory based on morphology.  4. No suspicious FDG avid adenopathy or extrathoracic metastases.  5. Mild hypermetabolic activity associated with the T5 and T6 compression fractures suggesting recent (acute/subacute) injuries. No measurable underlying lesion by PET/CT. Findings could relate to trauma or insufficiency fracture in the setting of   demineralization.    -7/22/2025 Pt reports for 7th imfinzi, reports her back pain is still hurting, still in control with current narcotic prescription through Dr. Dasilva as well as the gabapentin that she is using is 300 mg every 8 hours; more recently feeling return of numbness/PN in right arm only (had been there previously).   Still smoking 1/2 PPD; Is noticing more shortness of breath on exertion; and the coughing happening more often now-now everyday, multiple times a day; still with clear thick sputum mostly but sometimes is light greenish. Already taken amox and zpak.     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 04/29/2025       Past Surgical History:   Procedure Laterality Date    BRONCHOSCOPY N/A 8/30/2024    Procedure: BRONCHOSCOPY WITH BRONCHIAL WASHING ,ENDOBRONCHIAL ULTRASOUND WITH FINE NEEDLE ASPIRATION X2 AREAS;  Surgeon: Crystal Powell MD;  Location: [facility] ENDOSCOPY;   Service: Pulmonary;  Laterality: N/A;     SECTION      x3    KNEE SURGERY Left     ORIF, screws and plates, has had Operation on it x 3         Current Outpatient Medications:     albuterol sulfate  (90 Base) MCG/ACT inhaler, Inhale 1 puff Every 4 (Four) Hours As Needed for Wheezing., Disp: , Rfl:     Breztri Aerosphere 160-9-4.8 MCG/ACT aerosol inhaler, Inhale 2 puffs 2 (Two) Times a Day., Disp: , Rfl:     famotidine (PEPCID) 20 MG tablet, Take 1 tablet by mouth 2 (Two) Times a Day., Disp: 30 tablet, Rfl: 4    gabapentin (NEURONTIN) 300 MG capsule, Take 1 capsule by mouth 4 (Four) Times a Day. As instructed: 1 capsule in AM, 1 capsule in afternoon, 2 capsules 1 hour before bed, Disp: 120 capsule, Rfl: 3    Glucose Blood (Blood Glucose Test Strips 333) strip, Check sugar twice a day and PRN and keep a log In Vitro, Disp: , Rfl:     lidocaine-prilocaine (EMLA) 2.5-2.5 % cream, Apply 1 Application topically to the appropriate area as directed Every 2 (Two) Hours As Needed for Mild Pain. To port site before chemo/blood draw, Disp: 30 g, Rfl: 2    metFORMIN (GLUCOPHAGE) 1000 MG tablet, Every 12 (Twelve) Hours., Disp: , Rfl:     naloxone (NARCAN) 4 MG/0.1ML nasal spray, Call 911. Don't prime. [place] in 1 nostril for overdose. Repeat in 2-3 minutes in other nostril if no or minimal breathing/responsiveness., Disp: 2 each, Rfl: 0    ondansetron ODT (ZOFRAN-ODT) 8 MG disintegrating tablet, Take 1 tablet by mouth Every 8 (Eight) Hours As Needed for Nausea or Vomiting., Disp: 45 tablet, Rfl: 3    oxyCODONE (ROXICODONE) 10 MG tablet, Take 1 tablet by mouth Every 6 (Six) Hours As Needed for Moderate Pain (cancer treatment related pain)., Disp: 120 tablet, Rfl: 0    prochlorperazine (COMPAZINE) 10 MG tablet, Take 1 tablet by mouth Every 6 (Six) Hours As Needed for Nausea or Vomiting., Disp: 60 tablet, Rfl: 1    sennosides-docusate (senna-docusate sodium) 8.6-50 MG per tablet, Take 2 tablets by mouth Daily., Disp:  60 tablet, Rfl: 3  No current facility-administered medications for this visit.    Facility-Administered Medications Ordered in Other Visits:     alteplase (CATHFLO/ACTIVASE) injection 2 mg, 2 mg, Intracatheter, Q2H PRN, Holli Dickerson APRN, New Syringe/Cartridge at 07/22/25 1105    durvalumab (IMFINZI) 1,500 mg in sodium chloride 0.9 % 280 mL chemo IVPB, 1,500 mg, Intravenous, Once, Veronica Huynh MD PhD    sodium chloride 0.9 % infusion, 20 mL/hr, Intravenous, Once, Veronica Huynh MD PhD    No Known Allergies    Family History   Problem Relation Age of Onset    Lung cancer Brother         Lung cancer/Leukemia    Cancer Brother        Cancer-related family history includes Cancer in her brother; Lung cancer in her brother.    Social History     Tobacco Use    Smoking status: Every Day     Current packs/day: 0.50     Average packs/day: 0.5 packs/day for 48.6 years (24.3 ttl pk-yrs)     Types: Cigarettes     Start date: 1977    Smokeless tobacco: Never   Vaping Use    Vaping status: Never Used   Substance Use Topics    Alcohol use: Not Currently    Drug use: Never     Social History     Social History Narrative    Not on file        Review of Systems:  Constitutional: +fatigue (slightly improved), Denies any weakness, lack of appetite, excessive appetite, weight change, chills or fever.  Eyes: Reports no blurry vision, no double vision, no pain in the eyes, dry eyes or tearing.  ENT: Reports no decreased hearing capacity, ear pain or tinnitus. Denies any epistaxis, nasal congestion, mouth sores or bleeding, tooth or jaw pain, sore throat or hoarseness.  Respiratory: + chronic cough (worse)  Denies any sputum production or hemoptysis. There is no wheezing, shortness of breath or any other respiratory complaints.  Cardiovascular: + shortness of breath with activity (worse), Denies any chest pain, shortness of breath when lying down, palpitations, or leg swelling.  Breasts: Denies any lumps,  "bumps, pain, nipple changes or discharge in the breasts.  Gastrointestinal: painful swallowing (resolved), nausea (resolved), vomiting (resolved), constipation (better per HPI) There are no complaints of abdominal pain, difficulty swallowing or anorexia, diarrhea,  heartburn, blood in the stools, dark stools, or change in bowel habits or hemorrhoids.  Genitourinary: Denies any pain or burning on urination, blood in the urine or frequent urination.   Musculoskeletal: +upper and mid back pain (stable per HPI), Denies painful joints, no swelling of the joints, muscle pain. Denies any pain or tingling in the legs, hands or feet.  Neuropsychiatric: Denies any nervousness, depressed mood, headaches, blackouts, dizziness, weakness of limbs, loss of sensation, loss of balance, loss of coordination or difficulty in speaking.  Cutaneous: Denies any dry skin, itching, rash, change in the color of the skin, or any other cutaneous complaints.  Hematologic/Lymphatic: Denies any bruising or bleeding. Report no recent development of palpable or painful lymph nodes.  Allergy/Immunology: Denies rash/hayfever symptoms or any recent history of pneumonia, recurrent sinusitis, urinary infection or any other history of an ongoing infection.  Endocrine: Reports no intolerance to heat or cold, excessive thirst or excessive urination.    Objective:  Vital signs:  Vitals:    25 1038   BP: 134/68   Pulse: 89   Temp: 97.3 °F (36.3 °C)   TempSrc: Oral   SpO2: 96%   Weight: 84.9 kg (187 lb 3.2 oz)   Height: 172.7 cm (67.99\")   PainSc: 0-No pain     Body mass index is 28.47 kg/m².      Physical Exam:   ECO  GENERAL: The patient is a pleasant middle aged white female who appears their stated age and is awake, appears chronically ill but in no acute distress.  SKIN: No rash or ulcers.  HEME/LYMPHATICS: No bruising or petechiae on visual inspection. No lymphadenopathy on visual inspection and palpation of cervical, supraclavicular, " infraclavicular lymph nodes.  HEAD/FACE: atraumatic and normocephalic. Normal hair.  EYES: PERRLA/EOMI. No scleral icterus. No tearing or dry eyes.  ENT: External ears normal with no evidence of discharge. No evidence of ulceration or bleeding in the nostrils. Mouth has no ulcers, bleeding or inflammation of the gums, floor or roof of the mouth with normal dentition.  NECK: Supple, symmetric.   CHEST/RESPIRATORY: Expansion maintained bilaterally and symmetrically. On auscultation, clear breath sounds in left lung after coughing, stable decreased breath sounds in upper right lung field. +rhonchi  BREAST: Deferred.  CARDIOVASCULAR: On auscultation, regular rate and rhythm. No murmurs, gallops or rubs are heard.  GASTROINTESTINAL/ABDOMEN: Symmetric. On auscultation of the abdomen, there are normal bowel sounds in all four quadrants. There are no surgical scars in the abdomen. Nontender to palpation.  GENITOURINARY: Deferred.  NEUROLOGIC: Alert and oriented time, person, and place, with no apparent changes in recent or remote memory. Cranial nerves II-XII are grossly intact. Sensation is maintained in the extremities. Strength and tone appear normal for age and equal in the extremities. Gait is normal.  MUSCULOSKELETAL: + mildly TTP on midback for most of thoracic spine and paraspinous mm. No cyanosis, clubbing or edema in the extremities. There are no surgical scars on the extremities.  PSYCHIATRIC: The patient maintains judgment and has good insight. The patient has no changes in mood or affection.  IV: right chest Mediport accessed, NTTP, no erythema or edema..    Lab Results - Last 18 Months   Lab Units 07/22/25  1101 06/24/25  1313 05/27/25  1319   WBC 10*3/mm3 5.46 5.87 6.20   HEMOGLOBIN g/dL 12.0 12.9 12.9   HEMATOCRIT % 36.8 39.8 40.3   PLATELETS 10*3/mm3 262 289 284   MCV fL 88.5 90.7 90.8     Lab Results - Last 18 Months   Lab Units 06/24/25  1313 05/27/25  1319 04/29/25  1306   SODIUM mmol/L 139 137 136  "  POTASSIUM mmol/L 4.3 4.2 4.2   CHLORIDE mmol/L 104 100 100   CO2 mmol/L 23.3 27.4 25.0   BUN mg/dL 10.2 12.5 18   CREATININE mg/dL 0.82 0.84 1.17*   CALCIUM mg/dL 9.4 9.8 9.9   BILIRUBIN mg/dL 0.3 0.3 0.2   ALK PHOS U/L 93 97 102   ALT (SGPT) U/L 13 15 15   AST (SGOT) U/L 14 12 15   GLUCOSE mg/dL 141* 154* 127*       Lab Results   Component Value Date    GLUCOSE 141 (H) 06/24/2025    BUN 10.2 06/24/2025    CREATININE 0.82 06/24/2025    BCR 12.4 06/24/2025    K 4.3 06/24/2025    CO2 23.3 06/24/2025    CALCIUM 9.4 06/24/2025    ALBUMIN 4.0 06/24/2025    AST 14 06/24/2025    ALT 13 06/24/2025       Lab Results - Last 18 Months   Lab Units 04/08/25  0822 10/10/24  0810 08/26/24  0945   INR  0.92 <0.93* 0.93   APTT seconds 30.4 30.8 27.1       No results found for: \"IRON\", \"TIBC\", \"FERRITIN\"    No results found for: \"FOLATE\"    No results found for: \"OCCULTBLD\"    No results found for: \"RETICCTPCT\"  No results found for: \"COEUKIFX70\"  No results found for: \"SPEP\", \"UPEP\"  No results found for: \"LDH\", \"URICACID\"  No results found for: \"BEBETO\", \"RF\", \"SEDRATE\"  No results found for: \"FIBRINOGEN\", \"HAPTOGLOBIN\"  Lab Results   Component Value Date    PTT 30.4 04/08/2025    INR 0.92 04/08/2025     No results found for: \"\"  No results found for: \"CEA\"  No components found for: \"CA-19-9\"  No results found for: \"PSA\"  No results found for: \"SEDRATE\"    Assessment & Plan   65 y.o. female with PMH of smoking, skin cancer, arthritis, GERD who presented 8/2024  for newly diagnosed lung cancer, s/p definitive chemoradiation (completed 11/22/2024), currently on immunotherapy.    Right hilar mass/right upper lobe adenocarcinoma, at minimum T4 N2 (IIIB); 2/28/2025 with pain and imaging concerning for progression to stage IV (bone)    Previously discussed the above results which include imaging and pathology with the patient.  -Staging PET/CT no metastatic disease but confirmed IIIB disease, nonsurgical candidate-previously " discussed this in detail with the patient and her family and explained that the standard of care at this point would be chemoradiation with curative intent.  Discussed the couple chemotherapy regimens that can be used for radiation.  -Staging brain MRI negative for metastasis    -Radiology Oncology on board: pt completed her concurrent chemoradiation -11/22/2024  -PFTs from pulmonologist done per patient- per patient 50-60%    -Biomarker testing +PD-L1 testing with TPS 80% (category 1), +KRAS G12V (not G12V), negative for: EGFR mutation including exon 19 del, L858R and other deletions, exon 20 insertions, (category 1), ALK fusions (category 1), ROS1 fusions, BRAF V600E, NTRK 1/2/3 fusions, MET exon 14 skipping or amplifications, RET fusions, ErbB2 (HER2) alterations    -10/10/24 s/p Mediport placement with EMLA cream available  Didn't get cycle 4 chemotherapy on 12/31/2024 given COVID    -12/18/2024 PRIORITY CT chest scan already showing improvement in lung cancer size with no signs of progression near end of radiation completion showing improvement in lung cancer. Cannot do repeat imaging at this time given recent COVID infection (has been known to make lung nodules on imaging)  -1/16/25 Discussed durvalumab maintenance therapy every 2-4 weeks for one year per NCCN guidelines and FDA approval. We discussed that from the [facility] trial data, in stage III NSCLC nonresectable patients that receive durvalumab post definitive chemoradiation vs. those that did not, have a 48% reduction in risk of progression. 5 year post hoc analysis showed median overall survival with durvalumab was 47.5 months vs. 29.1 months with placebo. HR 0.72. We previously discussed that durvalumab is a PD-L1 inhibitor and classified as immunotherapy. Patient was provided a [facility] information sheet on Durvalumab. We discussed side effects including but not limited to immune mediated effects such as pneumonitis, colitis and hepatitis as well as  nausea, diarrhea, fatigue, electrolyte abnormalities and infusion reactions.    PLAN: cisplatin plus pemetrexed Q21 days X 3 cycles with concurrent radiation with repeat imaging showing improvement no progression (completed), followed by durvalumab 1500 mg Q4 weeks x 12 cycles (category 1 for stage III) unless serious A/E or PROGRESSION-ONGOING     -2/24/2024 CT C/A/P w/contrast: Right-sided port catheter coiled within the IJ vein with a small amount of fibrin sheath or clot surrounding the catheter. The catheter tip terminates at the junction of the IJ vein and the right subclavian vein, stable prior. Precarinal LN decreased in size measuring 8 mm, previously 13 mm. No new mediastinal adenopathy. RUL redemonstration of a mass extending to the right apex with size mildly decreased in the prior study with a representative measurement of 6.8 x 6.5 cm, previously 7.2 x 7.8 cm with extension through the pleura to involve the right posterior central third rib as seen on the prior study with increased sclerosis at this site with no new rib involvement. Mass again extends to the right hilum with narrowing of the RUL bronchus and obstruction of the RUL bronchus posterior segment and extends across the major pulmonary fissure to involve the medial superior segment of the RLL. No new or enlarging nodule. Mild superior endplate fracture at T4 level unchanged. Mild superior endplate compression fracture at T2 new from the prior study. No retropulsed fracture fragment. Liver is mildly enlarged measuring 20 cm in craniocaudal length. No aggressive osseous lesion or acute fracture.    -4/29/2025 discussed possibly adding bisphosphonate/denosumab -unable to get biopsy as CT no longer showing a spot in the bone to biopsy; will not need dental clearance (has full dentures).  Patient okay with proceeding particularly since it may help with pain if she does not fact have cancer in the bone.  Xgeva monthly ordered today for prior Auth  with plans to start on cycle 5 of immunotherapy.    -6/23/2025 CT C/A/P w/contrast: Stable RUL mass extending to the right apex with chest wall involvement, measurement of 8.1 x 6.5 cm at the right apex (6/23). Sclerosis of the right posterior third rib centrally unchanged (6/14). No new rib fracture. Compression fractures at T2 and T4 similar to the prior study. New compression fractures at T5 and T6. No retropulsed fracture fragment. Suspected thoracic osseous metastasis better assessed on prior MRI. There are several new areas of patchy groundglass opacity involving the anterior right upper lobe and the superior segment of the right lower lobe which could relate to infectious or inflammatory etiology or sequela of prior radiation therapy pneumonitis given the somewhat linear configuration (6/44, 4/42). Chronic narrowing at the proximal right upper lobe bronchus. Stable narrowing of the proximal RML bronchus and the distal bronchus intermedius. CATHERINE new pulmonary nodule measuring 13 mm (6/47). There is an additional peribronchovascular area of nodularity which may relate to a lymph node in the CATHERINE more centrally which measures 7 mm (6/47).  Hepatomegaly and hepatic steatosis. There is a stable small area of parenchymal enhancement in the left hepatic lobe near the dome which may relate to perfusional variation or flash filling hemangioma measuring 8 mm (6/26). No aggressive osseous lesion or acute fracture.    -7/10/2025 NM PET/CT: Reference uptake values: Mediastinum: 2.4 SUVmax Liver: 2.9 SUVmax  Probable physiologic activity in the anterior aspect of the tongue. Partially cavitary or necrotic RUL mass appears grossly similar in size compared to prior PET/Ct with persistent hypermetabolic activity within the anterior lateral aspect decreased from prior PET/CT mSUV 7 (prior mSUV 12.5). Similar asymmetric volume loss, pleural thickening and/or fluid. The previously noted new left upper lobe nodules show  hypermetabolic activity, the lateralmost nodule max SUV 3.9 and the more central peribronchovascular nodule SUV of 5.8. Focal patchy areas of consolidation in the right lower lobe appears similar, largest area of consolidation is mildly hypermetabolic max SUV 3.3. No suspicious FDG avid lymph node. No suspicious FDG avid mass or adenopathy in the abdomen/pelvis. Similar enlarged steatotic liver. No suspicious FDG avid bone lesion. Similar chronic deformity of the right posterior third rib shows mild increased FDG activity without measurable lesion, mSUV 2.5; other chronic compression deformities are without significant change. Increased FDG activity associated with the T5 and T6 compression fractures suggesting recent (acute/subacute) injuries. MSUV within the T5 compression fracture is 2.9 without a   discrete underlying bone lesion. Osseous demineralization.      -7/22/2025 patient H&P within acceptable parameters, proceed with C7 today (with C3 of Xgeva) and RTC in 4 weeks with labs prior for C8D1 and Xgeva #4.  -7/22/205 Besides elevated PDL1, she has +KRAS G12V (not G12V) mutation so the KRAS medications would not work/be approved. With no other actionable target, agree with Dr. Dasilva's discussion of seeing if Dr. Pendleton can biopsy the new lung nodule to see if it is the same lung cancer, a different lung cancer, or inflammation/infection.  Treatment options, should this be progression of disease would be spot radiation, spot radiation followed by switch from durvalumab to second line treatment which could include docetaxel, pemetrexed, gemcitabine, ramucirumab/docetaxel or Abraxane. Patient and daughter asked questions which were answered.    -*Data has shown a Palliative consult to help manage symptoms early on in care for advanced stage lung cancer patients-[facility] unfortunately not in pt's network.      2. Chronic mid-level back pain, multiple vertebral fx: concern for  metastatic disease vs osteoporotic fx  -2/4/2025 patient reporting not the same kind of pain she had on presentation (with the local invasion of the lung cancer into local tissues), getting more consistent aching pain. Was getting Светлана 7.5 liquid and using 1-2 times a day, occ up to 3X/day.    -3/4/2025: Re-ordered Светлана 7.5 mg (1.5 tablets) every 8 hours as needed for pain, 14-day supply.  -Patient already knows about bowel regimens do not get constipated.  -2/13/2025 NM bone scan: Increased uptake in the right third rib posteriorly corresponding to the area of tumor invasion locally in the right upper lobe. No evidence of distant metastatic disease.   -2/24/25 CT C/A/P as in #1: New mild superior endplate compression fracture at T2;   -2/28/2025 MRI T spine done- Subacute appearing compression deformity of the superior endplate of T2. Chronic mild compression deformity of superior plate of T4. No bony retropulsion, subluxation, or canal compromise. Rounded enhancing lesion is seen within the T5 vertebral body left of midline measuring 1.3 x 1.1 cm (series 11 image 22, series 7 image 8), consistent with metastatic disease. Questionable T7 enhancing lesion posteriorly and left of midline measuring 1.2 x 1.2 cm on series 7 image 5, without confident axial correlate, suspicious for metastatic involvement.-findings suspicious for metastatic disease involving T2, T3, as well. 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  -3/10/2025 PET/CT: Increased uptake in the right lobe of the thyroid (may represent a thyroid nodule, no nodule is identified on CT scan, mSUV 6). Redemonstration of RUL mass with cavitation. Significant decrease in size and FDG activity compared to the prior exam (mSUV 12.5), difficult to measure precisely, approximately 3.8 cm in diameter, decreased from prior (4.6 cm). Rim of mild FDG activity surrounding the cavitary portion, which is also decreased in size and activity compared to prior.  "There remains mild, persistent erosion into the right posterior third rib with mild residual FDG activity (mSUV 3.7). No extension into the chest wall or new pulmonary nodules. No abnormal FDG activity identified to correspond to lesion seen on recent MRI; no abnormal CT appearance to correspond to the lesions on MRI. No osseous lesions in the pelvis. Mild compression deformities of T2 and T4 superior endplates.  -4/1/2025: Re-ordered Светлана 10 mg every 6-8 hours as needed for pain, 30-day supply; also switching from gabapentin 100 AM and 200 PM to titrating up gabapentin 300 TID as discussed.  -4/29/25: Dr. Dasilva ordering for oxycodone, reordered gabapentin 300 mg in the morning, afternoon, with 600 mg 1 hour prior to bed for better control of pain while she is trying to sleep with refills.  Also ordered Xgeva as above  -5/27/25 stable back pain; no thyroid s/s, having some right arm neuropathy again (had gone for a few months)  -7/22/25 pt has stable back pain; no thyroid s/s, insurance/pharmacy giving Dr Dasilva's office difficulty filling pain Rx  -continue to monitor      3. Tobacco dependence  -tried nicotine gum but stuck to dentures, is 1/2 ppd;pt does not think she will be able to stop smoking  -discussed again today, pt not ready to decrease further yet \"why should I there's new spots\"      4.  Hyperglycemia, noted after start of treatment to 300s; improved after less dexamethasone (when chemo ended)  -Patient started on 1000 mg metformin daily by PCP, patient has had metformin changed and is currently taking at 1000 mg twice daily  -Continue to monitor;       5. Elevated TSH C3 immunotherapy  -4/1/2025 likely immunotherapy related; T4 normal  - 4/29/2025 TSH now up to 15 with T4 still within normal limits at 0.93; discussed with patient at some point may need to start levothyroxine but we will continue to monitor until then  -5/27/2025 TSH 15.9 T4 still WNL  -6/2025 T4 had dropped to 0.82 with TSH " having dropped to 4.5,   -7/2025 will see what the labs are today and told patient anticipate we may need to be starting low-dose thyroid medication at some point  -will continue to monitor; if/when T4 affected will need to add levothyroxine        Thank you very much for providing the opportunity to participate in this patient’s care. Please do not hesitate to call if there are any other questions.

## 2025-07-22 ENCOUNTER — HOSPITAL ENCOUNTER (OUTPATIENT)
Dept: ONCOLOGY | Facility: HOSPITAL | Age: 65
Discharge: HOME | End: 2025-07-22
Payer: MEDICARE

## 2025-07-22 ENCOUNTER — OFFICE VISIT (OUTPATIENT)
Dept: ONCOLOGY | Facility: CLINIC | Age: 65
End: 2025-07-22
Payer: MEDICARE

## 2025-07-22 ENCOUNTER — OFFICE VISIT (OUTPATIENT)
Dept: RADIATION ONCOLOGY | Facility: HOSPITAL | Age: 65
End: 2025-07-22
Payer: MEDICARE

## 2025-07-22 ENCOUNTER — TELEPHONE (OUTPATIENT)
Dept: SURGERY | Facility: CLINIC | Age: 65
End: 2025-07-22
Payer: MEDICARE

## 2025-07-22 VITALS
WEIGHT: 187.2 LBS | HEART RATE: 89 BPM | OXYGEN SATURATION: 96 % | SYSTOLIC BLOOD PRESSURE: 134 MMHG | HEIGHT: 68 IN | BODY MASS INDEX: 28.37 KG/M2 | DIASTOLIC BLOOD PRESSURE: 68 MMHG | TEMPERATURE: 97.3 F

## 2025-07-22 VITALS
DIASTOLIC BLOOD PRESSURE: 78 MMHG | RESPIRATION RATE: 18 BRPM | HEART RATE: 106 BPM | BODY MASS INDEX: 25.4 KG/M2 | SYSTOLIC BLOOD PRESSURE: 157 MMHG | WEIGHT: 167 LBS | OXYGEN SATURATION: 96 %

## 2025-07-22 DIAGNOSIS — C79.51 NON-SMALL CELL LUNG CANCER METASTATIC TO BONE: ICD-10-CM

## 2025-07-22 DIAGNOSIS — C34.90 NON-SMALL CELL LUNG CANCER METASTATIC TO INTRATHORACIC LYMPH NODE: ICD-10-CM

## 2025-07-22 DIAGNOSIS — C34.11 PRIMARY ADENOCARCINOMA OF UPPER LOBE OF RIGHT LUNG: Primary | ICD-10-CM

## 2025-07-22 DIAGNOSIS — C77.1 NON-SMALL CELL LUNG CANCER METASTATIC TO INTRATHORACIC LYMPH NODE: ICD-10-CM

## 2025-07-22 DIAGNOSIS — Z51.81 ENCOUNTER FOR THERAPEUTIC DRUG MONITORING: ICD-10-CM

## 2025-07-22 DIAGNOSIS — C34.11 MALIGNANT NEOPLASM OF UPPER LOBE OF RIGHT LUNG: ICD-10-CM

## 2025-07-22 DIAGNOSIS — C34.11 PRIMARY ADENOCARCINOMA OF UPPER LOBE OF RIGHT LUNG: ICD-10-CM

## 2025-07-22 DIAGNOSIS — C34.11 MALIGNANT NEOPLASM OF UPPER LOBE OF RIGHT LUNG: Primary | ICD-10-CM

## 2025-07-22 DIAGNOSIS — Z29.89 ENCOUNTER FOR IMMUNOTHERAPY: ICD-10-CM

## 2025-07-22 DIAGNOSIS — C34.90 NON-SMALL CELL LUNG CANCER METASTATIC TO BONE: ICD-10-CM

## 2025-07-22 DIAGNOSIS — C34.90 NON-SMALL CELL LUNG CANCER METASTATIC TO INTRATHORACIC LYMPH NODE: Primary | ICD-10-CM

## 2025-07-22 DIAGNOSIS — C77.1 NON-SMALL CELL LUNG CANCER METASTATIC TO INTRATHORACIC LYMPH NODE: Primary | ICD-10-CM

## 2025-07-22 LAB
ALBUMIN SERPL-MCNC: 4 G/DL (ref 3.5–5.2)
ALBUMIN/GLOB SERPL: 1.5 G/DL
ALP SERPL-CCNC: 82 U/L (ref 39–117)
ALT SERPL W P-5'-P-CCNC: 10 U/L (ref 1–33)
ANION GAP SERPL CALCULATED.3IONS-SCNC: 12.3 MMOL/L (ref 5–15)
AST SERPL-CCNC: 12 U/L (ref 1–32)
BASOPHILS # BLD AUTO: 0.02 10*3/MM3 (ref 0–0.2)
BASOPHILS NFR BLD AUTO: 0.4 % (ref 0–1.5)
BILIRUB SERPL-MCNC: <0.2 MG/DL (ref 0–1.2)
BUN SERPL-MCNC: 10.3 MG/DL (ref 8–23)
BUN/CREAT SERPL: 12.3 (ref 7–25)
CALCIUM SPEC-SCNC: 9.7 MG/DL (ref 8.6–10.5)
CHLORIDE SERPL-SCNC: 101 MMOL/L (ref 98–107)
CO2 SERPL-SCNC: 25.7 MMOL/L (ref 22–29)
CREAT SERPL-MCNC: 0.84 MG/DL (ref 0.57–1)
DEPRECATED RDW RBC AUTO: 44.7 FL (ref 37–54)
EGFRCR SERPLBLD CKD-EPI 2021: 77.2 ML/MIN/1.73
EOSINOPHIL # BLD AUTO: 0.07 10*3/MM3 (ref 0–0.4)
EOSINOPHIL NFR BLD AUTO: 1.3 % (ref 0.3–6.2)
ERYTHROCYTE [DISTWIDTH] IN BLOOD BY AUTOMATED COUNT: 13.7 % (ref 12.3–15.4)
GLOBULIN UR ELPH-MCNC: 2.7 GM/DL
GLUCOSE SERPL-MCNC: 155 MG/DL (ref 65–99)
HCT VFR BLD AUTO: 36.8 % (ref 34–46.6)
HGB BLD-MCNC: 12 G/DL (ref 12–15.9)
IMM GRANULOCYTES # BLD AUTO: 0.01 10*3/MM3 (ref 0–0.05)
IMM GRANULOCYTES NFR BLD AUTO: 0.2 % (ref 0–0.5)
LYMPHOCYTES # BLD AUTO: 0.92 10*3/MM3 (ref 0.7–3.1)
LYMPHOCYTES NFR BLD AUTO: 16.8 % (ref 19.6–45.3)
MAGNESIUM SERPL-MCNC: 1.7 MG/DL (ref 1.6–2.4)
MCH RBC QN AUTO: 28.8 PG (ref 26.6–33)
MCHC RBC AUTO-ENTMCNC: 32.6 G/DL (ref 31.5–35.7)
MCV RBC AUTO: 88.5 FL (ref 79–97)
MONOCYTES # BLD AUTO: 0.34 10*3/MM3 (ref 0.1–0.9)
MONOCYTES NFR BLD AUTO: 6.2 % (ref 5–12)
NEUTROPHILS NFR BLD AUTO: 4.1 10*3/MM3 (ref 1.7–7)
NEUTROPHILS NFR BLD AUTO: 75.1 % (ref 42.7–76)
PHOSPHATE SERPL-MCNC: 3.6 MG/DL (ref 2.5–4.5)
PLATELET # BLD AUTO: 262 10*3/MM3 (ref 140–450)
PMV BLD AUTO: 8.1 FL (ref 6–12)
POTASSIUM SERPL-SCNC: 4 MMOL/L (ref 3.5–5.2)
PROT SERPL-MCNC: 6.7 G/DL (ref 6–8.5)
RBC # BLD AUTO: 4.16 10*6/MM3 (ref 3.77–5.28)
SODIUM SERPL-SCNC: 139 MMOL/L (ref 136–145)
T4 FREE SERPL-MCNC: 0.97 NG/DL (ref 0.92–1.68)
TSH SERPL DL<=0.05 MIU/L-ACNC: 12.5 UIU/ML (ref 0.27–4.2)
WBC NRBC COR # BLD AUTO: 5.46 10*3/MM3 (ref 3.4–10.8)

## 2025-07-22 PROCEDURE — 99214 OFFICE O/P EST MOD 30 MIN: CPT | Performed by: INTERNAL MEDICINE

## 2025-07-22 PROCEDURE — 85025 COMPLETE CBC W/AUTO DIFF WBC: CPT | Performed by: INTERNAL MEDICINE

## 2025-07-22 PROCEDURE — 1125F AMNT PAIN NOTED PAIN PRSNT: CPT | Performed by: INTERNAL MEDICINE

## 2025-07-22 PROCEDURE — 84100 ASSAY OF PHOSPHORUS: CPT | Performed by: INTERNAL MEDICINE

## 2025-07-22 PROCEDURE — 25010000002 ALTEPLASE 2 MG RECONSTITUTED SOLUTION: Performed by: NURSE PRACTITIONER

## 2025-07-22 PROCEDURE — 83735 ASSAY OF MAGNESIUM: CPT | Performed by: INTERNAL MEDICINE

## 2025-07-22 PROCEDURE — 96413 CHEMO IV INFUSION 1 HR: CPT

## 2025-07-22 PROCEDURE — 96372 THER/PROPH/DIAG INJ SC/IM: CPT

## 2025-07-22 PROCEDURE — 80053 COMPREHEN METABOLIC PANEL: CPT | Performed by: INTERNAL MEDICINE

## 2025-07-22 PROCEDURE — 84439 ASSAY OF FREE THYROXINE: CPT | Performed by: INTERNAL MEDICINE

## 2025-07-22 PROCEDURE — G0463 HOSPITAL OUTPT CLINIC VISIT: HCPCS | Performed by: INTERNAL MEDICINE

## 2025-07-22 PROCEDURE — 25010000002 HEPARIN LOCK FLUSH PER 10 UNITS: Performed by: INTERNAL MEDICINE

## 2025-07-22 PROCEDURE — 25010000002 DURVALUMAB 50 MG/ML SOLUTION 10 ML VIAL: Performed by: INTERNAL MEDICINE

## 2025-07-22 PROCEDURE — 84443 ASSAY THYROID STIM HORMONE: CPT | Performed by: INTERNAL MEDICINE

## 2025-07-22 PROCEDURE — 36593 DECLOT VASCULAR DEVICE: CPT

## 2025-07-22 PROCEDURE — 25010000002 DENOSUMAB 120 MG/1.7ML SOLUTION: Performed by: INTERNAL MEDICINE

## 2025-07-22 PROCEDURE — 25810000003 SODIUM CHLORIDE 0.9 % SOLUTION 250 ML FLEX CONT: Performed by: INTERNAL MEDICINE

## 2025-07-22 RX ORDER — SODIUM CHLORIDE 0.9 % (FLUSH) 0.9 %
10 SYRINGE (ML) INJECTION AS NEEDED
Status: DISCONTINUED | OUTPATIENT
Start: 2025-07-22 | End: 2025-07-23 | Stop reason: HOSPADM

## 2025-07-22 RX ORDER — HEPARIN SODIUM (PORCINE) LOCK FLUSH IV SOLN 100 UNIT/ML 100 UNIT/ML
500 SOLUTION INTRAVENOUS AS NEEDED
Status: DISCONTINUED | OUTPATIENT
Start: 2025-07-22 | End: 2025-07-23 | Stop reason: HOSPADM

## 2025-07-22 RX ORDER — SODIUM CHLORIDE 0.9 % (FLUSH) 0.9 %
10 SYRINGE (ML) INJECTION AS NEEDED
OUTPATIENT
Start: 2025-07-22

## 2025-07-22 RX ORDER — SODIUM CHLORIDE 9 MG/ML
20 INJECTION, SOLUTION INTRAVENOUS ONCE
Status: DISCONTINUED | OUTPATIENT
Start: 2025-07-22 | End: 2025-07-23 | Stop reason: HOSPADM

## 2025-07-22 RX ORDER — HEPARIN SODIUM (PORCINE) LOCK FLUSH IV SOLN 100 UNIT/ML 100 UNIT/ML
500 SOLUTION INTRAVENOUS AS NEEDED
OUTPATIENT
Start: 2025-07-22

## 2025-07-22 RX ORDER — SODIUM CHLORIDE 9 MG/ML
20 INJECTION, SOLUTION INTRAVENOUS ONCE
Status: CANCELLED | OUTPATIENT
Start: 2025-07-22

## 2025-07-22 RX ADMIN — SODIUM CHLORIDE 1500 MG: 9 INJECTION, SOLUTION INTRAVENOUS at 12:22

## 2025-07-22 RX ADMIN — Medication 10 ML: at 13:28

## 2025-07-22 RX ADMIN — ALTEPLASE: 2.2 INJECTION, POWDER, LYOPHILIZED, FOR SOLUTION INTRAVENOUS at 11:05

## 2025-07-22 RX ADMIN — DENOSUMAB 120 MG: 120 INJECTION SUBCUTANEOUS at 12:45

## 2025-07-22 RX ADMIN — HEPARIN 500 UNITS: 100 SYRINGE at 13:28

## 2025-07-22 NOTE — TELEPHONE ENCOUNTER
LVM INFORMING PT OF APPT W/ DR. WAN ON 07/29/2025 AT 11:15AM. PROVIDED PT WITH CLINICS ADDRESS/ CALL BACK NUMBER.

## 2025-07-22 NOTE — PROGRESS NOTES
Pt in office for md reyna/Imfinzi/xgeva. Cbc/cmp drawn prior to arrival in infusion. [facility] infusaport already accessed. Postivie blood return from port. Treatment given as directed. AVS printed.

## 2025-07-22 NOTE — PATIENT INSTRUCTIONS
RTC in 4 weeks with labs for tox check with MD followed by infusion, please call if any worsening anemia symptoms or noticeable bleeding/bruising in interim    We'll call you if we need to add thyroid medication

## 2025-07-29 ENCOUNTER — OFFICE VISIT (OUTPATIENT)
Dept: SURGERY | Facility: CLINIC | Age: 65
End: 2025-07-29
Payer: MEDICARE

## 2025-07-29 ENCOUNTER — PATIENT OUTREACH (OUTPATIENT)
Dept: ONCOLOGY | Facility: CLINIC | Age: 65
End: 2025-07-29
Payer: MEDICARE

## 2025-07-29 VITALS
DIASTOLIC BLOOD PRESSURE: 76 MMHG | HEIGHT: 68 IN | SYSTOLIC BLOOD PRESSURE: 124 MMHG | OXYGEN SATURATION: 98 % | WEIGHT: 165.4 LBS | BODY MASS INDEX: 25.07 KG/M2

## 2025-07-29 DIAGNOSIS — C34.90 NON-SMALL CELL LUNG CANCER METASTATIC TO BONE: Primary | ICD-10-CM

## 2025-07-29 DIAGNOSIS — C34.11 MALIGNANT NEOPLASM OF UPPER LOBE OF RIGHT LUNG: ICD-10-CM

## 2025-07-29 DIAGNOSIS — C79.51 NON-SMALL CELL LUNG CANCER METASTATIC TO BONE: Primary | ICD-10-CM

## 2025-07-29 NOTE — PROGRESS NOTES
THORACIC SURGERY CLINIC CONSULT NOTE    REASON FOR CONSULT: Left upper lobe nodule    Subjective   HISTORY OF PRESENTING ILLNESS:   Laura Higginbotham is a 65 y.o. female who has significant medical problems as mentioned in the medical chart.     History of Present Illness  The patient presents for evaluation of lung nodules.    She underwent a CT scan, followed by a PET scan, which revealed abnormalities in her left lung. She has a history of cancer in the right lung, which was treated with chemotherapy and radiation due to its large size. A biopsy was performed under anesthesia. She is currently on immunotherapy, which started in 2025. Her last radiation treatment was in 2024. She continues to smoke half a pack of cigarettes daily, having reduced from a full pack. She has attempted to quit using nicotine gum. Dr. Dasilva has discussed the possibility of further radiation treatment, and Dr. Huynh provided information on potential chemotherapy options. She is eager to proceed with the biopsy to prevent any potential growth of the abnormality.    SOCIAL HISTORY  The patient admits to smoking half a pack a day.    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 2025       Past Surgical History:   Procedure Laterality Date    BRONCHOSCOPY N/A 2024    Procedure: BRONCHOSCOPY WITH BRONCHIAL WASHING ,ENDOBRONCHIAL ULTRASOUND WITH FINE NEEDLE ASPIRATION X2 AREAS;  Surgeon: Crystal Powell MD;  Location: [facility] ENDOSCOPY;  Service: Pulmonary;  Laterality: N/A;     SECTION      x3    KNEE SURGERY Left     ORIF, screws and plates, has had Operation on it x 3       Family History   Problem Relation Age of Onset    No Known Problems Mother     No Known Problems Father     Lung cancer Brother         Lung cancer/Leukemia    Cancer Brother        Social History     Socioeconomic  History    Marital status: Single   Tobacco Use    Smoking status: Every Day     Current packs/day: 0.50     Average packs/day: 0.5 packs/day for 48.6 years (24.3 ttl pk-yrs)     Types: Cigarettes     Start date: 1977    Smokeless tobacco: Never   Vaping Use    Vaping status: Never Used   Substance and Sexual Activity    Alcohol use: Not Currently    Drug use: Never    Sexual activity: Defer         Current Outpatient Medications:     albuterol sulfate  (90 Base) MCG/ACT inhaler, Inhale 1 puff Every 4 (Four) Hours As Needed for Wheezing., Disp: , Rfl:     Breztri Aerosphere 160-9-4.8 MCG/ACT aerosol inhaler, Inhale 2 puffs 2 (Two) Times a Day., Disp: , Rfl:     famotidine (PEPCID) 20 MG tablet, Take 1 tablet by mouth 2 (Two) Times a Day., Disp: 30 tablet, Rfl: 4    gabapentin (NEURONTIN) 300 MG capsule, Take 1 capsule by mouth 4 (Four) Times a Day. As instructed: 1 capsule in AM, 1 capsule in afternoon, 2 capsules 1 hour before bed, Disp: 120 capsule, Rfl: 3    Glucose Blood (Blood Glucose Test Strips 333) strip, Check sugar twice a day and PRN and keep a log In Vitro, Disp: , Rfl:     lidocaine-prilocaine (EMLA) 2.5-2.5 % cream, Apply 1 Application topically to the appropriate area as directed Every 2 (Two) Hours As Needed for Mild Pain. To port site before chemo/blood draw, Disp: 30 g, Rfl: 2    metFORMIN (GLUCOPHAGE) 1000 MG tablet, Every 12 (Twelve) Hours., Disp: , Rfl:     naloxone (NARCAN) 4 MG/0.1ML nasal spray, Call 911. Don't prime. [place] in 1 nostril for overdose. Repeat in 2-3 minutes in other nostril if no or minimal breathing/responsiveness., Disp: 2 each, Rfl: 0    ondansetron ODT (ZOFRAN-ODT) 8 MG disintegrating tablet, Take 1 tablet by mouth Every 8 (Eight) Hours As Needed for Nausea or Vomiting., Disp: 45 tablet, Rfl: 3    oxyCODONE (ROXICODONE) 10 MG tablet, Take 1 tablet by mouth Every 6 (Six) Hours As Needed for Moderate Pain (cancer treatment related pain)., Disp: 120 tablet, Rfl: 0     "prochlorperazine (COMPAZINE) 10 MG tablet, Take 1 tablet by mouth Every 6 (Six) Hours As Needed for Nausea or Vomiting., Disp: 60 tablet, Rfl: 1    sennosides-docusate (senna-docusate sodium) 8.6-50 MG per tablet, Take 2 tablets by mouth Daily., Disp: 60 tablet, Rfl: 3     No Known Allergies          Objective    OBJECTIVE:     VITAL SIGNS:  /76   Ht 172.7 cm (68\")   Wt 75 kg (165 lb 6.4 oz)   SpO2 98%   BMI 25.15 kg/m²     PHYSICAL EXAM:  Normal appearance.   Head is normocephalic.   Nose appears normal.   No obvious deformity of the mouth and throat.  Conjunctivae normal.   Heart rate and rhythm is normal.  Pulmonary effort is normal.   Moving all 4 extremities.  Extremities warm.  No focal deficit present.   Alert and oriented to person, place, and time.     RESULTS REVIEW:  I have reviewed the patient's all relevant laboratory and imaging findings.     Assessment & Plan    ASSESSMENT & PLAN:  Laura iHgginbotham is a 65 y.o. female with significant medical conditions as mentioned above presented to my clinic.    Assessment & Plan  1.  Left upper lobe nodule  The presence of small nodules on the left lung necessitates further investigation to determine their nature and potential relation to the existing lung cancer. The patient has a history of cancer on the right side, treated with chemotherapy and radiation, and is currently on immunotherapy. A bronchoscopy with biopsy will be performed to obtain samples from the nodules. The procedure involves using a small camera and needle to collect tissue samples from the lung. The risks, including a less than 3% chance of lung collapse, were discussed. If a small collapse occurs, an overnight hospital stay may be required; if larger, a chest tube and a longer stay will be necessary. Post-procedure symptoms may include coughing, blood in sputum, and a sore throat. The final results will take 3 to 5 days. If the nodules are related to lung cancer, treatment adjustments " will be considered, potentially involving radiation or chemotherapy. If no diagnosis is made, future monitoring will be necessary. If the nodules increase in size, it may indicate missed cancer; if they remain the same or decrease, inflammation or infection is likely.    2. Smoking cessation.  The patient currently smokes half a pack per day. Continued smoking cessation was strongly advised to improve the effectiveness of immunotherapy and overall health. The patient has previously tried nicotine gum. Strategies to cope with stress without smoking were discussed, emphasizing the importance of changing thought patterns related to smoking.    I discussed the patients findings and my recommendations with the patient/family/caregiver. The patient/family/caregiver was given adequate time to ask questions and all questions were answered to patient satisfaction. Thank you for this consult and allowing us to participate in the care of your patient.      Yordan Pendleton MD  Thoracic Surgeon  [facility] [place]        Dictated utilizing Dragon dictation    I spent 60 minutes caring for Laura on this date of service. This time includes time spent by me in the following activities:preparing for the visit, reviewing tests, obtaining and/or reviewing a separately obtained history, performing a medically appropriate examination and/or evaluation, counseling and educating the patient/family/caregiver, ordering medications, tests, or procedures, referring and communicating with other health care professionals , documenting information in the medical record, independently interpreting results and communicating that information with the patient/family/caregiver, and care coordination and more than half the time was spent in direct face to face evaluation and decision making.     Patient or patient representative verbalized consent for the use of Ambient Listening during the visit with  Yordan Pendleton MD for chart  documentation. 7/29/2025  13:10 EDT

## 2025-07-29 NOTE — SIGNIFICANT NOTE
I accompanied patient and friend to Dr. Pendleton's office visit.     Dr. Pendleton reviewed scan images and discussed ion bronch. Will also need a planning scan, CT ion protocol. Risks and benefits discussed of Ion bronch. All questions answered.

## 2025-07-30 DIAGNOSIS — C79.51 NON-SMALL CELL LUNG CANCER METASTATIC TO BONE: Primary | ICD-10-CM

## 2025-07-30 DIAGNOSIS — R91.1 LUNG NODULE: ICD-10-CM

## 2025-07-30 DIAGNOSIS — C34.90 NON-SMALL CELL LUNG CANCER METASTATIC TO BONE: Primary | ICD-10-CM

## 2025-08-04 ENCOUNTER — HOSPITAL ENCOUNTER (OUTPATIENT)
Dept: PET IMAGING | Facility: HOSPITAL | Age: 65
Discharge: HOME | End: 2025-08-04
Admitting: SURGERY
Payer: MEDICARE

## 2025-08-04 DIAGNOSIS — C34.11 MALIGNANT NEOPLASM OF UPPER LOBE OF RIGHT LUNG: ICD-10-CM

## 2025-08-04 PROCEDURE — 71250 CT THORAX DX C-: CPT

## 2025-08-08 ENCOUNTER — APPOINTMENT (OUTPATIENT)
Dept: GENERAL RADIOLOGY | Facility: HOSPITAL | Age: 65
End: 2025-08-08
Payer: MEDICARE

## 2025-08-08 ENCOUNTER — ANESTHESIA EVENT (OUTPATIENT)
Dept: GASTROENTEROLOGY | Facility: HOSPITAL | Age: 65
End: 2025-08-08
Payer: MEDICARE

## 2025-08-08 ENCOUNTER — HOSPITAL ENCOUNTER (OUTPATIENT)
Facility: HOSPITAL | Age: 65
Setting detail: HOSPITAL OUTPATIENT SURGERY
Discharge: HOME | End: 2025-08-08
Attending: SURGERY | Admitting: SURGERY
Payer: MEDICARE

## 2025-08-08 ENCOUNTER — ANESTHESIA (OUTPATIENT)
Dept: GASTROENTEROLOGY | Facility: HOSPITAL | Age: 65
End: 2025-08-08
Payer: MEDICARE

## 2025-08-08 VITALS
DIASTOLIC BLOOD PRESSURE: 70 MMHG | TEMPERATURE: 97.7 F | HEART RATE: 89 BPM | SYSTOLIC BLOOD PRESSURE: 120 MMHG | RESPIRATION RATE: 16 BRPM | BODY MASS INDEX: 24.97 KG/M2 | WEIGHT: 164.2 LBS | OXYGEN SATURATION: 99 %

## 2025-08-08 DIAGNOSIS — R91.1 LUNG NODULE: ICD-10-CM

## 2025-08-08 DIAGNOSIS — C79.51 NON-SMALL CELL LUNG CANCER METASTATIC TO BONE: ICD-10-CM

## 2025-08-08 DIAGNOSIS — C34.90 NON-SMALL CELL LUNG CANCER METASTATIC TO BONE: ICD-10-CM

## 2025-08-08 LAB — GLUCOSE BLDC GLUCOMTR-MCNC: 116 MG/DL (ref 65–99)

## 2025-08-08 PROCEDURE — 25010000002 SUGAMMADEX 200 MG/2ML SOLUTION: Performed by: NURSE ANESTHETIST, CERTIFIED REGISTERED

## 2025-08-08 PROCEDURE — 31624 DX BRONCHOSCOPE/LAVAGE: CPT | Performed by: SURGERY

## 2025-08-08 PROCEDURE — 94640 AIRWAY INHALATION TREATMENT: CPT

## 2025-08-08 PROCEDURE — 31627 NAVIGATIONAL BRONCHOSCOPY: CPT | Performed by: SURGERY

## 2025-08-08 PROCEDURE — 31625 BRONCHOSCOPY W/BIOPSY(S): CPT | Performed by: SURGERY

## 2025-08-08 PROCEDURE — 25010000002 PHENYLEPHRINE 10 MG/ML SOLUTION: Performed by: NURSE ANESTHETIST, CERTIFIED REGISTERED

## 2025-08-08 PROCEDURE — 25810000003 LACTATED RINGERS PER 1000 ML: Performed by: SURGERY

## 2025-08-08 PROCEDURE — 94761 N-INVAS EAR/PLS OXIMETRY MLT: CPT

## 2025-08-08 PROCEDURE — 94664 DEMO&/EVAL PT USE INHALER: CPT

## 2025-08-08 PROCEDURE — 82948 REAGENT STRIP/BLOOD GLUCOSE: CPT | Performed by: SURGERY

## 2025-08-08 PROCEDURE — 94799 UNLISTED PULMONARY SVC/PX: CPT

## 2025-08-08 PROCEDURE — 88172 CYTP DX EVAL FNA 1ST EA SITE: CPT | Performed by: SURGERY

## 2025-08-08 PROCEDURE — 76000 FLUOROSCOPY <1 HR PHYS/QHP: CPT

## 2025-08-08 PROCEDURE — 25010000002 LIDOCAINE 2% SOLUTION: Performed by: NURSE ANESTHETIST, CERTIFIED REGISTERED

## 2025-08-08 PROCEDURE — 31654 BRONCH EBUS IVNTJ PERPH LES: CPT | Performed by: SURGERY

## 2025-08-08 PROCEDURE — 88112 CYTOPATH CELL ENHANCE TECH: CPT | Performed by: SURGERY

## 2025-08-08 PROCEDURE — 88342 IMHCHEM/IMCYTCHM 1ST ANTB: CPT | Performed by: SURGERY

## 2025-08-08 PROCEDURE — 88360 TUMOR IMMUNOHISTOCHEM/MANUAL: CPT | Performed by: SURGERY

## 2025-08-08 PROCEDURE — 88341 IMHCHEM/IMCYTCHM EA ADD ANTB: CPT | Performed by: SURGERY

## 2025-08-08 PROCEDURE — 25010000002 PROPOFOL 10 MG/ML EMULSION: Performed by: NURSE ANESTHETIST, CERTIFIED REGISTERED

## 2025-08-08 PROCEDURE — 25010000002 SUCCINYLCHOLINE PER 20 MG: Performed by: NURSE ANESTHETIST, CERTIFIED REGISTERED

## 2025-08-08 PROCEDURE — 88173 CYTOPATH EVAL FNA REPORT: CPT | Performed by: SURGERY

## 2025-08-08 PROCEDURE — 25010000002 DEXAMETHASONE PER 1 MG: Performed by: NURSE ANESTHETIST, CERTIFIED REGISTERED

## 2025-08-08 PROCEDURE — 88305 TISSUE EXAM BY PATHOLOGIST: CPT | Performed by: SURGERY

## 2025-08-08 PROCEDURE — 25010000002 GLYCOPYRROLATE 0.2 MG/ML SOLUTION: Performed by: NURSE ANESTHETIST, CERTIFIED REGISTERED

## 2025-08-08 PROCEDURE — 71045 X-RAY EXAM CHEST 1 VIEW: CPT

## 2025-08-08 PROCEDURE — 25010000002 ONDANSETRON PER 1 MG: Performed by: NURSE ANESTHETIST, CERTIFIED REGISTERED

## 2025-08-08 RX ORDER — DIPHENHYDRAMINE HYDROCHLORIDE 50 MG/ML
12.5 INJECTION, SOLUTION INTRAMUSCULAR; INTRAVENOUS
Status: DISCONTINUED | OUTPATIENT
Start: 2025-08-08 | End: 2025-08-08 | Stop reason: HOSPADM

## 2025-08-08 RX ORDER — FENTANYL CITRATE 50 UG/ML
50 INJECTION, SOLUTION INTRAMUSCULAR; INTRAVENOUS
Status: DISCONTINUED | OUTPATIENT
Start: 2025-08-08 | End: 2025-08-08 | Stop reason: HOSPADM

## 2025-08-08 RX ORDER — LABETALOL HYDROCHLORIDE 5 MG/ML
5 INJECTION, SOLUTION INTRAVENOUS
Status: DISCONTINUED | OUTPATIENT
Start: 2025-08-08 | End: 2025-08-08 | Stop reason: HOSPADM

## 2025-08-08 RX ORDER — ATROPINE SULFATE 0.4 MG/ML
0.4 INJECTION, SOLUTION INTRAMUSCULAR; INTRAVENOUS; SUBCUTANEOUS ONCE AS NEEDED
Status: DISCONTINUED | OUTPATIENT
Start: 2025-08-08 | End: 2025-08-08 | Stop reason: HOSPADM

## 2025-08-08 RX ORDER — PROMETHAZINE HYDROCHLORIDE 25 MG/1
25 SUPPOSITORY RECTAL ONCE AS NEEDED
Status: DISCONTINUED | OUTPATIENT
Start: 2025-08-08 | End: 2025-08-08 | Stop reason: HOSPADM

## 2025-08-08 RX ORDER — GLYCOPYRROLATE 0.2 MG/ML
INJECTION INTRAMUSCULAR; INTRAVENOUS AS NEEDED
Status: DISCONTINUED | OUTPATIENT
Start: 2025-08-08 | End: 2025-08-08 | Stop reason: SURG

## 2025-08-08 RX ORDER — OXYCODONE AND ACETAMINOPHEN 7.5; 325 MG/1; MG/1
1 TABLET ORAL EVERY 4 HOURS PRN
Refills: 0 | Status: DISCONTINUED | OUTPATIENT
Start: 2025-08-08 | End: 2025-08-08 | Stop reason: HOSPADM

## 2025-08-08 RX ORDER — SODIUM CHLORIDE, SODIUM LACTATE, POTASSIUM CHLORIDE, CALCIUM CHLORIDE 600; 310; 30; 20 MG/100ML; MG/100ML; MG/100ML; MG/100ML
1000 INJECTION, SOLUTION INTRAVENOUS CONTINUOUS
Status: ACTIVE | OUTPATIENT
Start: 2025-08-08 | End: 2025-08-08

## 2025-08-08 RX ORDER — IPRATROPIUM BROMIDE AND ALBUTEROL SULFATE 2.5; .5 MG/3ML; MG/3ML
3 SOLUTION RESPIRATORY (INHALATION) ONCE
Status: COMPLETED | OUTPATIENT
Start: 2025-08-08 | End: 2025-08-08

## 2025-08-08 RX ORDER — PHENYLEPHRINE HYDROCHLORIDE 10 MG/ML
INJECTION INTRAVENOUS AS NEEDED
Status: DISCONTINUED | OUTPATIENT
Start: 2025-08-08 | End: 2025-08-08 | Stop reason: SURG

## 2025-08-08 RX ORDER — DROPERIDOL 2.5 MG/ML
0.62 INJECTION, SOLUTION INTRAMUSCULAR; INTRAVENOUS
Status: DISCONTINUED | OUTPATIENT
Start: 2025-08-08 | End: 2025-08-08 | Stop reason: HOSPADM

## 2025-08-08 RX ORDER — HYDROMORPHONE HYDROCHLORIDE 1 MG/ML
0.5 INJECTION, SOLUTION INTRAMUSCULAR; INTRAVENOUS; SUBCUTANEOUS
Refills: 0 | Status: DISCONTINUED | OUTPATIENT
Start: 2025-08-08 | End: 2025-08-08 | Stop reason: HOSPADM

## 2025-08-08 RX ORDER — DEXAMETHASONE SODIUM PHOSPHATE 4 MG/ML
INJECTION, SOLUTION INTRA-ARTICULAR; INTRALESIONAL; INTRAMUSCULAR; INTRAVENOUS; SOFT TISSUE AS NEEDED
Status: DISCONTINUED | OUTPATIENT
Start: 2025-08-08 | End: 2025-08-08 | Stop reason: SURG

## 2025-08-08 RX ORDER — IPRATROPIUM BROMIDE AND ALBUTEROL SULFATE 2.5; .5 MG/3ML; MG/3ML
SOLUTION RESPIRATORY (INHALATION)
Status: DISCONTINUED
Start: 2025-08-08 | End: 2025-08-08 | Stop reason: HOSPADM

## 2025-08-08 RX ORDER — ROCURONIUM BROMIDE 10 MG/ML
INJECTION, SOLUTION INTRAVENOUS AS NEEDED
Status: DISCONTINUED | OUTPATIENT
Start: 2025-08-08 | End: 2025-08-08 | Stop reason: SURG

## 2025-08-08 RX ORDER — ONDANSETRON 2 MG/ML
4 INJECTION INTRAMUSCULAR; INTRAVENOUS ONCE AS NEEDED
Status: DISCONTINUED | OUTPATIENT
Start: 2025-08-08 | End: 2025-08-08 | Stop reason: HOSPADM

## 2025-08-08 RX ORDER — LIDOCAINE HYDROCHLORIDE 20 MG/ML
INJECTION, SOLUTION INFILTRATION; PERINEURAL AS NEEDED
Status: DISCONTINUED | OUTPATIENT
Start: 2025-08-08 | End: 2025-08-08 | Stop reason: SURG

## 2025-08-08 RX ORDER — EPHEDRINE SULFATE 50 MG/ML
5 INJECTION, SOLUTION INTRAVENOUS ONCE AS NEEDED
Status: DISCONTINUED | OUTPATIENT
Start: 2025-08-08 | End: 2025-08-08 | Stop reason: HOSPADM

## 2025-08-08 RX ORDER — HYDROCODONE BITARTRATE AND ACETAMINOPHEN 5; 325 MG/1; MG/1
1 TABLET ORAL ONCE AS NEEDED
Refills: 0 | Status: DISCONTINUED | OUTPATIENT
Start: 2025-08-08 | End: 2025-08-08 | Stop reason: HOSPADM

## 2025-08-08 RX ORDER — IPRATROPIUM BROMIDE AND ALBUTEROL SULFATE 2.5; .5 MG/3ML; MG/3ML
3 SOLUTION RESPIRATORY (INHALATION) ONCE AS NEEDED
Status: DISCONTINUED | OUTPATIENT
Start: 2025-08-08 | End: 2025-08-08 | Stop reason: HOSPADM

## 2025-08-08 RX ORDER — FLUMAZENIL 0.1 MG/ML
0.2 INJECTION INTRAVENOUS AS NEEDED
Status: DISCONTINUED | OUTPATIENT
Start: 2025-08-08 | End: 2025-08-08 | Stop reason: HOSPADM

## 2025-08-08 RX ORDER — NALOXONE HCL 0.4 MG/ML
0.2 VIAL (ML) INJECTION AS NEEDED
Status: DISCONTINUED | OUTPATIENT
Start: 2025-08-08 | End: 2025-08-08 | Stop reason: HOSPADM

## 2025-08-08 RX ORDER — SUCCINYLCHOLINE CHLORIDE 20 MG/ML
INJECTION INTRAMUSCULAR; INTRAVENOUS AS NEEDED
Status: DISCONTINUED | OUTPATIENT
Start: 2025-08-08 | End: 2025-08-08 | Stop reason: SURG

## 2025-08-08 RX ORDER — PROMETHAZINE HYDROCHLORIDE 25 MG/1
25 TABLET ORAL ONCE AS NEEDED
Status: DISCONTINUED | OUTPATIENT
Start: 2025-08-08 | End: 2025-08-08 | Stop reason: HOSPADM

## 2025-08-08 RX ORDER — ONDANSETRON 2 MG/ML
INJECTION INTRAMUSCULAR; INTRAVENOUS AS NEEDED
Status: DISCONTINUED | OUTPATIENT
Start: 2025-08-08 | End: 2025-08-08 | Stop reason: SURG

## 2025-08-08 RX ORDER — HYDRALAZINE HYDROCHLORIDE 20 MG/ML
5 INJECTION INTRAMUSCULAR; INTRAVENOUS
Status: DISCONTINUED | OUTPATIENT
Start: 2025-08-08 | End: 2025-08-08 | Stop reason: HOSPADM

## 2025-08-08 RX ORDER — PROPOFOL 10 MG/ML
VIAL (ML) INTRAVENOUS AS NEEDED
Status: DISCONTINUED | OUTPATIENT
Start: 2025-08-08 | End: 2025-08-08 | Stop reason: SURG

## 2025-08-08 RX ADMIN — GLYCOPYRROLATE 0.2 MG: 0.2 INJECTION INTRAMUSCULAR; INTRAVENOUS at 09:54

## 2025-08-08 RX ADMIN — SODIUM CHLORIDE, POTASSIUM CHLORIDE, SODIUM LACTATE AND CALCIUM CHLORIDE 1000 ML: 600; 310; 30; 20 INJECTION, SOLUTION INTRAVENOUS at 08:24

## 2025-08-08 RX ADMIN — SUCCINYLCHOLINE CHLORIDE 200 MG: 20 INJECTION, SOLUTION INTRAMUSCULAR; INTRAVENOUS; PARENTERAL at 09:44

## 2025-08-08 RX ADMIN — IPRATROPIUM BROMIDE AND ALBUTEROL SULFATE 3 ML: .5; 3 SOLUTION RESPIRATORY (INHALATION) at 08:35

## 2025-08-08 RX ADMIN — PROPOFOL 160 MCG/KG/MIN: 10 INJECTION, EMULSION INTRAVENOUS at 09:54

## 2025-08-08 RX ADMIN — LIDOCAINE HYDROCHLORIDE 60 MG: 20 INJECTION, SOLUTION INFILTRATION; PERINEURAL at 09:44

## 2025-08-08 RX ADMIN — PROPOFOL 200 MG: 10 INJECTION, EMULSION INTRAVENOUS at 09:44

## 2025-08-08 RX ADMIN — PHENYLEPHRINE HYDROCHLORIDE 200 MCG: 10 INJECTION INTRAVENOUS at 10:01

## 2025-08-08 RX ADMIN — ONDANSETRON 4 MG: 2 INJECTION, SOLUTION INTRAMUSCULAR; INTRAVENOUS at 10:18

## 2025-08-08 RX ADMIN — ROCURONIUM BROMIDE 50 MG: 10 INJECTION INTRAVENOUS at 09:52

## 2025-08-08 RX ADMIN — DEXAMETHASONE SODIUM PHOSPHATE 8 MG: 4 INJECTION, SOLUTION INTRA-ARTICULAR; INTRALESIONAL; INTRAMUSCULAR; INTRAVENOUS; SOFT TISSUE at 09:54

## 2025-08-08 RX ADMIN — PHENYLEPHRINE HYDROCHLORIDE 200 MCG: 10 INJECTION INTRAVENOUS at 10:15

## 2025-08-08 RX ADMIN — SUGAMMADEX 400 MG: 100 INJECTION, SOLUTION INTRAVENOUS at 10:22

## 2025-08-11 LAB
CYTO UR: NORMAL
DX PRELIMINARY: NORMAL
LAB AP CASE REPORT: NORMAL
LAB AP DIAGNOSIS COMMENT: NORMAL
LAB AP NON-GYN SPECIMEN ADEQUACY: NORMAL
LAB AP SPECIAL STAINS: NORMAL
PATH REPORT.FINAL DX SPEC: NORMAL
PATH REPORT.GROSS SPEC: NORMAL

## 2025-08-12 ENCOUNTER — OFFICE VISIT (OUTPATIENT)
Dept: SURGERY | Facility: CLINIC | Age: 65
End: 2025-08-12
Payer: MEDICARE

## 2025-08-12 VITALS
WEIGHT: 162.4 LBS | SYSTOLIC BLOOD PRESSURE: 127 MMHG | DIASTOLIC BLOOD PRESSURE: 80 MMHG | HEIGHT: 68 IN | OXYGEN SATURATION: 99 % | BODY MASS INDEX: 24.61 KG/M2

## 2025-08-12 DIAGNOSIS — C34.12 SMALL CELL CARCINOMA OF UPPER LOBE OF LEFT LUNG: Primary | ICD-10-CM

## 2025-08-12 PROCEDURE — 99214 OFFICE O/P EST MOD 30 MIN: CPT | Performed by: SURGERY

## 2025-08-13 ENCOUNTER — PATIENT OUTREACH (OUTPATIENT)
Dept: ONCOLOGY | Facility: CLINIC | Age: 65
End: 2025-08-13
Payer: MEDICARE

## 2025-08-13 ENCOUNTER — PREP FOR SURGERY (OUTPATIENT)
Dept: OTHER | Facility: HOSPITAL | Age: 65
End: 2025-08-13
Payer: MEDICARE

## 2025-08-13 DIAGNOSIS — C34.11 MALIGNANT NEOPLASM OF UPPER LOBE OF RIGHT LUNG: Primary | ICD-10-CM

## 2025-08-15 ENCOUNTER — HOSPITAL ENCOUNTER (OUTPATIENT)
Dept: CARDIOLOGY | Facility: HOSPITAL | Age: 65
Discharge: HOME | End: 2025-08-15
Payer: MEDICARE

## 2025-08-15 ENCOUNTER — LAB (OUTPATIENT)
Dept: LAB | Facility: HOSPITAL | Age: 65
End: 2025-08-15
Payer: MEDICARE

## 2025-08-15 DIAGNOSIS — C34.11 MALIGNANT NEOPLASM OF UPPER LOBE OF RIGHT LUNG: ICD-10-CM

## 2025-08-15 DIAGNOSIS — C79.51 NON-SMALL CELL LUNG CANCER METASTATIC TO BONE: ICD-10-CM

## 2025-08-15 DIAGNOSIS — C34.90 NON-SMALL CELL LUNG CANCER METASTATIC TO BONE: ICD-10-CM

## 2025-08-15 PROCEDURE — 93005 ELECTROCARDIOGRAM TRACING: CPT

## 2025-08-15 RX ORDER — OXYCODONE HYDROCHLORIDE 10 MG/1
10 TABLET ORAL EVERY 6 HOURS PRN
Qty: 120 TABLET | Refills: 0 | Status: SHIPPED | OUTPATIENT
Start: 2025-08-15 | End: 2025-08-18 | Stop reason: SDUPTHER

## 2025-08-17 ENCOUNTER — ANESTHESIA EVENT (OUTPATIENT)
Dept: PERIOP | Facility: HOSPITAL | Age: 65
End: 2025-08-17
Payer: MEDICARE

## 2025-08-18 ENCOUNTER — ANESTHESIA (OUTPATIENT)
Dept: PERIOP | Facility: HOSPITAL | Age: 65
End: 2025-08-18
Payer: MEDICARE

## 2025-08-18 ENCOUNTER — PATIENT OUTREACH (OUTPATIENT)
Dept: ONCOLOGY | Facility: CLINIC | Age: 65
End: 2025-08-18
Payer: MEDICARE

## 2025-08-18 ENCOUNTER — HOSPITAL ENCOUNTER (OUTPATIENT)
Facility: HOSPITAL | Age: 65
Setting detail: HOSPITAL OUTPATIENT SURGERY
Discharge: HOME | End: 2025-08-18
Attending: SURGERY | Admitting: SURGERY
Payer: MEDICARE

## 2025-08-18 ENCOUNTER — APPOINTMENT (OUTPATIENT)
Dept: GENERAL RADIOLOGY | Facility: HOSPITAL | Age: 65
End: 2025-08-18
Payer: MEDICARE

## 2025-08-18 DIAGNOSIS — C34.11 MALIGNANT NEOPLASM OF UPPER LOBE OF RIGHT LUNG: ICD-10-CM

## 2025-08-18 DIAGNOSIS — C34.11 MALIGNANT NEOPLASM OF UPPER LOBE OF RIGHT LUNG: Primary | ICD-10-CM

## 2025-08-18 DIAGNOSIS — C79.51 NON-SMALL CELL LUNG CANCER METASTATIC TO BONE: ICD-10-CM

## 2025-08-18 DIAGNOSIS — C34.90 NON-SMALL CELL LUNG CANCER METASTATIC TO BONE: ICD-10-CM

## 2025-08-18 PROCEDURE — 25010000002 GLYCOPYRROLATE 0.2 MG/ML SOLUTION: Performed by: NURSE ANESTHETIST, CERTIFIED REGISTERED

## 2025-08-18 PROCEDURE — 25010000002 FENTANYL CITRATE (PF) 50 MCG/ML SOLUTION: Performed by: NURSE ANESTHETIST, CERTIFIED REGISTERED

## 2025-08-18 PROCEDURE — 25010000002 LIDOCAINE PF 1% 1 % SOLUTION: Performed by: NURSE ANESTHETIST, CERTIFIED REGISTERED

## 2025-08-18 PROCEDURE — 25810000003 LACTATED RINGERS PER 1000 ML: Performed by: NURSE ANESTHETIST, CERTIFIED REGISTERED

## 2025-08-18 PROCEDURE — 25010000002 PROPOFOL 10 MG/ML EMULSION: Performed by: NURSE ANESTHETIST, CERTIFIED REGISTERED

## 2025-08-18 PROCEDURE — 25010000002 ONDANSETRON PER 1 MG: Performed by: NURSE ANESTHETIST, CERTIFIED REGISTERED

## 2025-08-18 RX ORDER — SODIUM CHLORIDE, SODIUM LACTATE, POTASSIUM CHLORIDE, CALCIUM CHLORIDE 600; 310; 30; 20 MG/100ML; MG/100ML; MG/100ML; MG/100ML
INJECTION, SOLUTION INTRAVENOUS CONTINUOUS PRN
Status: DISCONTINUED | OUTPATIENT
Start: 2025-08-18 | End: 2025-08-18 | Stop reason: SURG

## 2025-08-18 RX ORDER — GLYCOPYRROLATE 0.2 MG/ML
INJECTION INTRAMUSCULAR; INTRAVENOUS AS NEEDED
Status: DISCONTINUED | OUTPATIENT
Start: 2025-08-18 | End: 2025-08-18 | Stop reason: SURG

## 2025-08-18 RX ORDER — FENTANYL CITRATE 50 UG/ML
INJECTION, SOLUTION INTRAMUSCULAR; INTRAVENOUS AS NEEDED
Status: DISCONTINUED | OUTPATIENT
Start: 2025-08-18 | End: 2025-08-18 | Stop reason: SURG

## 2025-08-18 RX ORDER — LIDOCAINE HYDROCHLORIDE 10 MG/ML
INJECTION, SOLUTION EPIDURAL; INFILTRATION; INTRACAUDAL; PERINEURAL AS NEEDED
Status: DISCONTINUED | OUTPATIENT
Start: 2025-08-18 | End: 2025-08-18 | Stop reason: SURG

## 2025-08-18 RX ORDER — ONDANSETRON 2 MG/ML
INJECTION INTRAMUSCULAR; INTRAVENOUS AS NEEDED
Status: DISCONTINUED | OUTPATIENT
Start: 2025-08-18 | End: 2025-08-18 | Stop reason: SURG

## 2025-08-18 RX ORDER — PROPOFOL 10 MG/ML
VIAL (ML) INTRAVENOUS AS NEEDED
Status: DISCONTINUED | OUTPATIENT
Start: 2025-08-18 | End: 2025-08-18 | Stop reason: SURG

## 2025-08-18 RX ORDER — OXYCODONE HYDROCHLORIDE 10 MG/1
10 TABLET ORAL EVERY 6 HOURS PRN
Qty: 120 TABLET | Refills: 0 | Status: SHIPPED | OUTPATIENT
Start: 2025-08-18

## 2025-08-18 RX ADMIN — FENTANYL CITRATE 50 MCG: 50 INJECTION, SOLUTION INTRAMUSCULAR; INTRAVENOUS at 07:30

## 2025-08-18 RX ADMIN — FENTANYL CITRATE 50 MCG: 50 INJECTION, SOLUTION INTRAMUSCULAR; INTRAVENOUS at 07:50

## 2025-08-18 RX ADMIN — ONDANSETRON 4 MG: 2 INJECTION INTRAMUSCULAR; INTRAVENOUS at 07:56

## 2025-08-18 RX ADMIN — LIDOCAINE HYDROCHLORIDE 50 MG: 10 INJECTION, SOLUTION EPIDURAL; INFILTRATION; INTRACAUDAL; PERINEURAL at 07:30

## 2025-08-18 RX ADMIN — SODIUM CHLORIDE, SODIUM LACTATE, POTASSIUM CHLORIDE, AND CALCIUM CHLORIDE: .6; .31; .03; .02 INJECTION, SOLUTION INTRAVENOUS at 07:26

## 2025-08-18 RX ADMIN — GLYCOPYRROLATE 0.1 MG: 0.2 SOLUTION INTRAMUSCULAR; INTRAVENOUS at 07:40

## 2025-08-18 RX ADMIN — PROPOFOL 50 MG: 10 INJECTION, EMULSION INTRAVENOUS at 07:30

## 2025-08-18 RX ADMIN — PROPOFOL 135 MCG/KG/MIN: 10 INJECTION, EMULSION INTRAVENOUS at 07:32

## 2025-08-19 ENCOUNTER — LAB (OUTPATIENT)
Dept: LAB | Facility: HOSPITAL | Age: 65
End: 2025-08-19
Payer: MEDICARE

## 2025-08-19 ENCOUNTER — HOSPITAL ENCOUNTER (OUTPATIENT)
Dept: MRI IMAGING | Facility: HOSPITAL | Age: 65
Setting detail: HOSPITAL OUTPATIENT SURGERY
Discharge: HOME | End: 2025-08-19
Payer: MEDICARE

## 2025-08-19 ENCOUNTER — CONSULT (OUTPATIENT)
Dept: ONCOLOGY | Facility: CLINIC | Age: 65
End: 2025-08-19
Payer: MEDICARE

## 2025-08-19 VITALS
WEIGHT: 168.4 LBS | HEART RATE: 85 BPM | DIASTOLIC BLOOD PRESSURE: 75 MMHG | OXYGEN SATURATION: 100 % | HEIGHT: 68 IN | SYSTOLIC BLOOD PRESSURE: 130 MMHG | BODY MASS INDEX: 25.52 KG/M2

## 2025-08-19 DIAGNOSIS — E03.9 HYPOTHYROIDISM, UNSPECIFIED TYPE: ICD-10-CM

## 2025-08-19 DIAGNOSIS — G89.3 NEOPLASM RELATED PAIN: ICD-10-CM

## 2025-08-19 DIAGNOSIS — C79.51 NON-SMALL CELL LUNG CANCER METASTATIC TO BONE: ICD-10-CM

## 2025-08-19 DIAGNOSIS — C34.12 SMALL CELL CARCINOMA OF UPPER LOBE OF LEFT LUNG: ICD-10-CM

## 2025-08-19 DIAGNOSIS — C34.90 NON-SMALL CELL LUNG CANCER METASTATIC TO BONE: ICD-10-CM

## 2025-08-19 DIAGNOSIS — C34.90 NON-SMALL CELL LUNG CANCER METASTATIC TO BONE: Primary | ICD-10-CM

## 2025-08-19 DIAGNOSIS — C34.11 MALIGNANT NEOPLASM OF UPPER LOBE OF RIGHT LUNG: ICD-10-CM

## 2025-08-19 DIAGNOSIS — C79.51 NON-SMALL CELL LUNG CANCER METASTATIC TO BONE: Primary | ICD-10-CM

## 2025-08-19 LAB
ALBUMIN SERPL-MCNC: 4.1 G/DL (ref 3.5–5.2)
ALBUMIN/GLOB SERPL: 1.4 G/DL
ALP SERPL-CCNC: 78 U/L (ref 39–117)
ALT SERPL W P-5'-P-CCNC: 6 U/L (ref 1–33)
ANION GAP SERPL CALCULATED.3IONS-SCNC: 10.2 MMOL/L (ref 5–15)
AST SERPL-CCNC: 9 U/L (ref 1–32)
BASOPHILS # BLD AUTO: 0.03 10*3/MM3 (ref 0–0.2)
BASOPHILS NFR BLD AUTO: 0.3 % (ref 0–1.5)
BILIRUB SERPL-MCNC: <0.2 MG/DL (ref 0–1.2)
BUN SERPL-MCNC: 15.2 MG/DL (ref 8–23)
BUN/CREAT SERPL: 15 (ref 7–25)
CALCIUM SPEC-SCNC: 10 MG/DL (ref 8.6–10.5)
CHLORIDE SERPL-SCNC: 103 MMOL/L (ref 98–107)
CO2 SERPL-SCNC: 27.8 MMOL/L (ref 22–29)
CREAT SERPL-MCNC: 1.01 MG/DL (ref 0.57–1)
DEPRECATED RDW RBC AUTO: 45.1 FL (ref 37–54)
EGFRCR SERPLBLD CKD-EPI 2021: 61.9 ML/MIN/1.73
EOSINOPHIL # BLD AUTO: 0.07 10*3/MM3 (ref 0–0.4)
EOSINOPHIL NFR BLD AUTO: 0.6 % (ref 0.3–6.2)
ERYTHROCYTE [DISTWIDTH] IN BLOOD BY AUTOMATED COUNT: 13.9 % (ref 12.3–15.4)
GLOBULIN UR ELPH-MCNC: 3 GM/DL
GLUCOSE SERPL-MCNC: 152 MG/DL (ref 65–99)
HCT VFR BLD AUTO: 37.9 % (ref 34–46.6)
HGB BLD-MCNC: 12.4 G/DL (ref 12–15.9)
IMM GRANULOCYTES # BLD AUTO: 0.02 10*3/MM3 (ref 0–0.05)
IMM GRANULOCYTES NFR BLD AUTO: 0.2 % (ref 0–0.5)
LYMPHOCYTES # BLD AUTO: 1.91 10*3/MM3 (ref 0.7–3.1)
LYMPHOCYTES NFR BLD AUTO: 17.7 % (ref 19.6–45.3)
MCH RBC QN AUTO: 29 PG (ref 26.6–33)
MCHC RBC AUTO-ENTMCNC: 32.7 G/DL (ref 31.5–35.7)
MCV RBC AUTO: 88.8 FL (ref 79–97)
MONOCYTES # BLD AUTO: 0.57 10*3/MM3 (ref 0.1–0.9)
MONOCYTES NFR BLD AUTO: 5.3 % (ref 5–12)
NEUTROPHILS NFR BLD AUTO: 75.9 % (ref 42.7–76)
NEUTROPHILS NFR BLD AUTO: 8.2 10*3/MM3 (ref 1.7–7)
PLATELET # BLD AUTO: 295 10*3/MM3 (ref 140–450)
PMV BLD AUTO: 8.5 FL (ref 6–12)
POTASSIUM SERPL-SCNC: 4.5 MMOL/L (ref 3.5–5.2)
PROT SERPL-MCNC: 7.1 G/DL (ref 6–8.5)
RBC # BLD AUTO: 4.27 10*6/MM3 (ref 3.77–5.28)
SODIUM SERPL-SCNC: 141 MMOL/L (ref 136–145)
T-UPTAKE NFR SERPL: 1.13 TBI (ref 0.8–1.3)
T4 SERPL-MCNC: 6.28 MCG/DL (ref 4.5–11.7)
TSH SERPL DL<=0.05 MIU/L-ACNC: 3.34 UIU/ML (ref 0.27–4.2)
WBC NRBC COR # BLD AUTO: 10.8 10*3/MM3 (ref 3.4–10.8)

## 2025-08-19 PROCEDURE — 84479 ASSAY OF THYROID (T3 OR T4): CPT | Performed by: STUDENT IN AN ORGANIZED HEALTH CARE EDUCATION/TRAINING PROGRAM

## 2025-08-19 PROCEDURE — 84436 ASSAY OF TOTAL THYROXINE: CPT | Performed by: STUDENT IN AN ORGANIZED HEALTH CARE EDUCATION/TRAINING PROGRAM

## 2025-08-19 PROCEDURE — 25010000002 GADOTERIDOL PER 1 ML: Performed by: SURGERY

## 2025-08-19 PROCEDURE — 84443 ASSAY THYROID STIM HORMONE: CPT | Performed by: STUDENT IN AN ORGANIZED HEALTH CARE EDUCATION/TRAINING PROGRAM

## 2025-08-19 PROCEDURE — 70553 MRI BRAIN STEM W/O & W/DYE: CPT

## 2025-08-19 PROCEDURE — 36415 COLL VENOUS BLD VENIPUNCTURE: CPT

## 2025-08-19 PROCEDURE — A9579 GAD-BASE MR CONTRAST NOS,1ML: HCPCS | Performed by: SURGERY

## 2025-08-19 PROCEDURE — 85025 COMPLETE CBC W/AUTO DIFF WBC: CPT

## 2025-08-19 PROCEDURE — 80053 COMPREHEN METABOLIC PANEL: CPT | Performed by: STUDENT IN AN ORGANIZED HEALTH CARE EDUCATION/TRAINING PROGRAM

## 2025-08-19 RX ORDER — GADOTERIDOL 279.3 MG/ML
20 INJECTION INTRAVENOUS
Status: COMPLETED | OUTPATIENT
Start: 2025-08-19 | End: 2025-08-19

## 2025-08-19 RX ORDER — SODIUM CHLORIDE 9 MG/ML
20 INJECTION, SOLUTION INTRAVENOUS ONCE
OUTPATIENT
Start: 2025-08-28

## 2025-08-19 RX ORDER — FAMOTIDINE 10 MG/ML
20 INJECTION, SOLUTION INTRAVENOUS AS NEEDED
OUTPATIENT
Start: 2025-08-26

## 2025-08-19 RX ORDER — SODIUM CHLORIDE 9 MG/ML
20 INJECTION, SOLUTION INTRAVENOUS ONCE
OUTPATIENT
Start: 2025-08-27

## 2025-08-19 RX ORDER — PALONOSETRON 0.05 MG/ML
0.25 INJECTION, SOLUTION INTRAVENOUS ONCE
OUTPATIENT
Start: 2025-08-26

## 2025-08-19 RX ORDER — DIPHENHYDRAMINE HYDROCHLORIDE 50 MG/ML
50 INJECTION, SOLUTION INTRAMUSCULAR; INTRAVENOUS AS NEEDED
OUTPATIENT
Start: 2025-08-26

## 2025-08-19 RX ORDER — SODIUM CHLORIDE 9 MG/ML
20 INJECTION, SOLUTION INTRAVENOUS ONCE
OUTPATIENT
Start: 2025-08-26

## 2025-08-19 RX ORDER — HYDROCORTISONE SODIUM SUCCINATE 100 MG/2ML
100 INJECTION INTRAMUSCULAR; INTRAVENOUS AS NEEDED
OUTPATIENT
Start: 2025-08-26

## 2025-08-19 RX ADMIN — GADOTERIDOL 20 ML: 279.3 INJECTION, SOLUTION INTRAVENOUS at 18:27

## 2025-08-22 ENCOUNTER — OFFICE VISIT (OUTPATIENT)
Dept: RADIATION ONCOLOGY | Facility: HOSPITAL | Age: 65
End: 2025-08-22
Payer: MEDICARE

## 2025-08-22 VITALS
SYSTOLIC BLOOD PRESSURE: 122 MMHG | DIASTOLIC BLOOD PRESSURE: 76 MMHG | RESPIRATION RATE: 18 BRPM | WEIGHT: 166 LBS | BODY MASS INDEX: 25.24 KG/M2 | OXYGEN SATURATION: 97 % | HEART RATE: 110 BPM

## 2025-08-22 DIAGNOSIS — C34.90 NON-SMALL CELL LUNG CANCER METASTATIC TO BONE: ICD-10-CM

## 2025-08-22 DIAGNOSIS — C34.90 NON-SMALL CELL LUNG CANCER METASTATIC TO INTRATHORACIC LYMPH NODE: ICD-10-CM

## 2025-08-22 DIAGNOSIS — C77.1 NON-SMALL CELL LUNG CANCER METASTATIC TO INTRATHORACIC LYMPH NODE: ICD-10-CM

## 2025-08-22 DIAGNOSIS — C34.11 MALIGNANT NEOPLASM OF UPPER LOBE OF RIGHT LUNG: Primary | ICD-10-CM

## 2025-08-22 DIAGNOSIS — C79.51 NON-SMALL CELL LUNG CANCER METASTATIC TO BONE: ICD-10-CM

## 2025-08-22 PROCEDURE — G0463 HOSPITAL OUTPT CLINIC VISIT: HCPCS | Performed by: INTERNAL MEDICINE

## 2025-08-27 ENCOUNTER — OFFICE VISIT (OUTPATIENT)
Dept: PHARMACY | Facility: HOSPITAL | Age: 65
End: 2025-08-27
Payer: MEDICARE

## 2025-08-27 ENCOUNTER — HOSPITAL ENCOUNTER (OUTPATIENT)
Dept: ONCOLOGY | Facility: HOSPITAL | Age: 65
Discharge: HOME | End: 2025-08-27
Payer: MEDICARE

## 2025-08-27 VITALS
BODY MASS INDEX: 25.2 KG/M2 | RESPIRATION RATE: 16 BRPM | SYSTOLIC BLOOD PRESSURE: 139 MMHG | DIASTOLIC BLOOD PRESSURE: 75 MMHG | OXYGEN SATURATION: 99 % | WEIGHT: 166.3 LBS | HEART RATE: 100 BPM | TEMPERATURE: 97.3 F | HEIGHT: 68 IN

## 2025-08-27 DIAGNOSIS — C79.51 NON-SMALL CELL LUNG CANCER METASTATIC TO BONE: ICD-10-CM

## 2025-08-27 DIAGNOSIS — C34.11 PRIMARY ADENOCARCINOMA OF UPPER LOBE OF RIGHT LUNG: ICD-10-CM

## 2025-08-27 DIAGNOSIS — C34.90 NON-SMALL CELL LUNG CANCER METASTATIC TO BONE: ICD-10-CM

## 2025-08-27 DIAGNOSIS — C34.90 NON-SMALL CELL LUNG CANCER METASTATIC TO INTRATHORACIC LYMPH NODE: ICD-10-CM

## 2025-08-27 DIAGNOSIS — C34.11 MALIGNANT NEOPLASM OF UPPER LOBE OF RIGHT LUNG: ICD-10-CM

## 2025-08-27 DIAGNOSIS — C77.1 NON-SMALL CELL LUNG CANCER METASTATIC TO INTRATHORACIC LYMPH NODE: ICD-10-CM

## 2025-08-27 DIAGNOSIS — C34.12 SMALL CELL CARCINOMA OF UPPER LOBE OF LEFT LUNG: Primary | ICD-10-CM

## 2025-08-27 LAB
ALP BLD-CCNC: 75 U/L (ref 42–141)
BASOPHILS # BLD AUTO: 0.02 10*3/MM3 (ref 0–0.2)
BASOPHILS NFR BLD AUTO: 0.3 % (ref 0–1.5)
BUN BLDA-MCNC: 14 MG/DL (ref 7–22)
CALCIUM BLD QL: 8.8 MG/DL (ref 8–10.3)
CHLORIDE BLDA-SCNC: 104 MMOL/L (ref 98–108)
CO2 BLDA-SCNC: 26 MMOL/L (ref 18–33)
CREAT BLDA-MCNC: 0.8 MG/DL (ref 0.6–1.2)
DEPRECATED RDW RBC AUTO: 44.4 FL (ref 37–54)
EGFRCR SERPLBLD CKD-EPI 2021: 81.9 ML/MIN/1.73
EOSINOPHIL # BLD AUTO: 0.08 10*3/MM3 (ref 0–0.4)
EOSINOPHIL NFR BLD AUTO: 1.3 % (ref 0.3–6.2)
ERYTHROCYTE [DISTWIDTH] IN BLOOD BY AUTOMATED COUNT: 13.5 % (ref 12.3–15.4)
GLUCOSE BLDC GLUCOMTR-MCNC: 170 MG/DL (ref 73–118)
HCT VFR BLD AUTO: 35 % (ref 34–46.6)
HGB BLD-MCNC: 11.6 G/DL (ref 12–15.9)
IMM GRANULOCYTES # BLD AUTO: 0.01 10*3/MM3 (ref 0–0.05)
IMM GRANULOCYTES NFR BLD AUTO: 0.2 % (ref 0–0.5)
LYMPHOCYTES # BLD AUTO: 1.2 10*3/MM3 (ref 0.7–3.1)
LYMPHOCYTES NFR BLD AUTO: 19.6 % (ref 19.6–45.3)
MAGNESIUM SERPL-MCNC: 1.9 MG/DL (ref 1.6–2.4)
MCH RBC QN AUTO: 29.5 PG (ref 26.6–33)
MCHC RBC AUTO-ENTMCNC: 33.1 G/DL (ref 31.5–35.7)
MCV RBC AUTO: 89.1 FL (ref 79–97)
MONOCYTES # BLD AUTO: 0.43 10*3/MM3 (ref 0.1–0.9)
MONOCYTES NFR BLD AUTO: 7 % (ref 5–12)
NEUTROPHILS NFR BLD AUTO: 4.37 10*3/MM3 (ref 1.7–7)
NEUTROPHILS NFR BLD AUTO: 71.6 % (ref 42.7–76)
PHOSPHATE SERPL-MCNC: 2.9 MG/DL (ref 2.5–4.5)
PLATELET # BLD AUTO: 269 10*3/MM3 (ref 140–450)
PMV BLD AUTO: 8.2 FL (ref 6–12)
POC ALBUMIN: 3.3 G/L (ref 3.3–5.5)
POC ALT (SGPT): 17 U/L (ref 10–47)
POC AST (SGOT): 18 U/L (ref 11–38)
POC TOTAL BILIRUBIN: 0.5 MG/DL (ref 0.2–1.6)
POC TOTAL PROTEIN: 7.1 G/DL (ref 6.4–8.1)
POTASSIUM BLDA-SCNC: 4 MMOL/L (ref 3.6–5.1)
RBC # BLD AUTO: 3.93 10*6/MM3 (ref 3.77–5.28)
SODIUM BLD-SCNC: 140 MMOL/L (ref 128–145)
WBC NRBC COR # BLD AUTO: 6.11 10*3/MM3 (ref 3.4–10.8)

## 2025-08-27 PROCEDURE — 84100 ASSAY OF PHOSPHORUS: CPT | Performed by: INTERNAL MEDICINE

## 2025-08-27 PROCEDURE — 80053 COMPREHEN METABOLIC PANEL: CPT

## 2025-08-27 PROCEDURE — 25810000003 SODIUM CHLORIDE 0.9 % SOLUTION 500 ML FLEX CONT: Performed by: STUDENT IN AN ORGANIZED HEALTH CARE EDUCATION/TRAINING PROGRAM

## 2025-08-27 PROCEDURE — 96413 CHEMO IV INFUSION 1 HR: CPT

## 2025-08-27 PROCEDURE — 96375 TX/PRO/DX INJ NEW DRUG ADDON: CPT

## 2025-08-27 PROCEDURE — 25010000002 HEPARIN LOCK FLUSH PER 10 UNITS: Performed by: INTERNAL MEDICINE

## 2025-08-27 PROCEDURE — 25010000002 FOSAPREPITANT PER 1 MG: Performed by: STUDENT IN AN ORGANIZED HEALTH CARE EDUCATION/TRAINING PROGRAM

## 2025-08-27 PROCEDURE — 25810000003 SODIUM CHLORIDE 0.9 % SOLUTION 250 ML FLEX CONT: Performed by: STUDENT IN AN ORGANIZED HEALTH CARE EDUCATION/TRAINING PROGRAM

## 2025-08-27 PROCEDURE — 25010000002 DEXAMETHASONE PER 1 MG: Performed by: STUDENT IN AN ORGANIZED HEALTH CARE EDUCATION/TRAINING PROGRAM

## 2025-08-27 PROCEDURE — 96417 CHEMO IV INFUS EACH ADDL SEQ: CPT

## 2025-08-27 PROCEDURE — 83735 ASSAY OF MAGNESIUM: CPT | Performed by: INTERNAL MEDICINE

## 2025-08-27 PROCEDURE — 96367 TX/PROPH/DG ADDL SEQ IV INF: CPT

## 2025-08-27 PROCEDURE — 25810000003 SODIUM CHLORIDE 0.9 % SOLUTION: Performed by: STUDENT IN AN ORGANIZED HEALTH CARE EDUCATION/TRAINING PROGRAM

## 2025-08-27 PROCEDURE — 85025 COMPLETE CBC W/AUTO DIFF WBC: CPT | Performed by: STUDENT IN AN ORGANIZED HEALTH CARE EDUCATION/TRAINING PROGRAM

## 2025-08-27 PROCEDURE — 25010000002 CARBOPLATIN PER 50 MG: Performed by: STUDENT IN AN ORGANIZED HEALTH CARE EDUCATION/TRAINING PROGRAM

## 2025-08-27 PROCEDURE — 25010000002 ETOPOSIDE 1 GM/50ML SOLUTION 50 ML VIAL: Performed by: STUDENT IN AN ORGANIZED HEALTH CARE EDUCATION/TRAINING PROGRAM

## 2025-08-27 PROCEDURE — 25010000002 PALONOSETRON 0.25 MG/5ML SOLUTION PREFILLED SYRINGE: Performed by: STUDENT IN AN ORGANIZED HEALTH CARE EDUCATION/TRAINING PROGRAM

## 2025-08-27 RX ORDER — SODIUM CHLORIDE 9 MG/ML
20 INJECTION, SOLUTION INTRAVENOUS ONCE
Status: COMPLETED | OUTPATIENT
Start: 2025-08-27 | End: 2025-08-27

## 2025-08-27 RX ORDER — HEPARIN SODIUM (PORCINE) LOCK FLUSH IV SOLN 100 UNIT/ML 100 UNIT/ML
500 SOLUTION INTRAVENOUS AS NEEDED
Status: DISCONTINUED | OUTPATIENT
Start: 2025-08-27 | End: 2025-08-28 | Stop reason: HOSPADM

## 2025-08-27 RX ORDER — HEPARIN SODIUM (PORCINE) LOCK FLUSH IV SOLN 100 UNIT/ML 100 UNIT/ML
500 SOLUTION INTRAVENOUS AS NEEDED
Status: CANCELLED | OUTPATIENT
Start: 2025-08-27

## 2025-08-27 RX ORDER — DEXAMETHASONE SODIUM PHOSPHATE 4 MG/ML
12 INJECTION, SOLUTION INTRA-ARTICULAR; INTRALESIONAL; INTRAMUSCULAR; INTRAVENOUS; SOFT TISSUE ONCE
Status: COMPLETED | OUTPATIENT
Start: 2025-08-27 | End: 2025-08-27

## 2025-08-27 RX ORDER — PALONOSETRON 0.05 MG/ML
0.25 INJECTION, SOLUTION INTRAVENOUS ONCE
Status: COMPLETED | OUTPATIENT
Start: 2025-08-27 | End: 2025-08-27

## 2025-08-27 RX ORDER — SODIUM CHLORIDE 0.9 % (FLUSH) 0.9 %
10 SYRINGE (ML) INJECTION AS NEEDED
Status: CANCELLED | OUTPATIENT
Start: 2025-08-27

## 2025-08-27 RX ORDER — SODIUM CHLORIDE 0.9 % (FLUSH) 0.9 %
10 SYRINGE (ML) INJECTION AS NEEDED
Status: DISCONTINUED | OUTPATIENT
Start: 2025-08-27 | End: 2025-08-28 | Stop reason: HOSPADM

## 2025-08-27 RX ORDER — ONDANSETRON 8 MG/1
8 TABLET, FILM COATED ORAL 3 TIMES DAILY PRN
Qty: 30 TABLET | Refills: 5 | Status: ON HOLD | OUTPATIENT
Start: 2025-08-27

## 2025-08-27 RX ORDER — OLANZAPINE 5 MG/1
5 TABLET, FILM COATED ORAL NIGHTLY
Qty: 4 TABLET | Refills: 3 | Status: ON HOLD | OUTPATIENT
Start: 2025-08-27

## 2025-08-27 RX ADMIN — HEPARIN 500 UNITS: 100 SYRINGE at 12:51

## 2025-08-27 RX ADMIN — FOSAPREPITANT 100 ML: 150 INJECTION, POWDER, LYOPHILIZED, FOR SOLUTION INTRAVENOUS at 10:27

## 2025-08-27 RX ADMIN — Medication 10 ML: at 12:51

## 2025-08-27 RX ADMIN — SODIUM CHLORIDE 190 MG: 9 INJECTION, SOLUTION INTRAVENOUS at 11:48

## 2025-08-27 RX ADMIN — SODIUM CHLORIDE 540 MG: 9 INJECTION, SOLUTION INTRAVENOUS at 11:13

## 2025-08-27 RX ADMIN — SODIUM CHLORIDE 20 ML/HR: 9 INJECTION, SOLUTION INTRAVENOUS at 10:20

## 2025-08-27 RX ADMIN — PALONOSETRON 0.25 MG: 0.25 INJECTION, SOLUTION INTRAVENOUS at 10:21

## 2025-08-27 RX ADMIN — DEXAMETHASONE SODIUM PHOSPHATE 12 MG: 4 INJECTION INTRA-ARTICULAR; INTRALESIONAL; INTRAMUSCULAR; INTRAVENOUS; SOFT TISSUE at 10:24

## 2025-08-28 ENCOUNTER — TELEPHONE (OUTPATIENT)
Dept: ONCOLOGY | Facility: CLINIC | Age: 65
End: 2025-08-28
Payer: MEDICARE

## 2025-08-28 ENCOUNTER — HOSPITAL ENCOUNTER (OUTPATIENT)
Dept: ONCOLOGY | Facility: HOSPITAL | Age: 65
Discharge: HOME | End: 2025-08-28
Admitting: STUDENT IN AN ORGANIZED HEALTH CARE EDUCATION/TRAINING PROGRAM
Payer: MEDICARE

## 2025-08-28 VITALS
BODY MASS INDEX: 25.04 KG/M2 | DIASTOLIC BLOOD PRESSURE: 70 MMHG | HEIGHT: 68 IN | HEART RATE: 92 BPM | OXYGEN SATURATION: 99 % | WEIGHT: 165.2 LBS | TEMPERATURE: 96.6 F | SYSTOLIC BLOOD PRESSURE: 120 MMHG | RESPIRATION RATE: 20 BRPM

## 2025-08-28 DIAGNOSIS — C34.12 SMALL CELL CARCINOMA OF UPPER LOBE OF LEFT LUNG: Primary | ICD-10-CM

## 2025-08-28 DIAGNOSIS — C34.11 MALIGNANT NEOPLASM OF UPPER LOBE OF RIGHT LUNG: ICD-10-CM

## 2025-08-28 PROCEDURE — 25010000002 HEPARIN LOCK FLUSH PER 10 UNITS: Performed by: INTERNAL MEDICINE

## 2025-08-28 PROCEDURE — 25810000003 SODIUM CHLORIDE 0.9 % SOLUTION: Performed by: STUDENT IN AN ORGANIZED HEALTH CARE EDUCATION/TRAINING PROGRAM

## 2025-08-28 PROCEDURE — 96375 TX/PRO/DX INJ NEW DRUG ADDON: CPT

## 2025-08-28 PROCEDURE — 25810000003 SODIUM CHLORIDE 0.9 % SOLUTION 500 ML FLEX CONT: Performed by: STUDENT IN AN ORGANIZED HEALTH CARE EDUCATION/TRAINING PROGRAM

## 2025-08-28 PROCEDURE — 25010000002 ETOPOSIDE 1 GM/50ML SOLUTION 50 ML VIAL: Performed by: STUDENT IN AN ORGANIZED HEALTH CARE EDUCATION/TRAINING PROGRAM

## 2025-08-28 PROCEDURE — 96413 CHEMO IV INFUSION 1 HR: CPT

## 2025-08-28 PROCEDURE — 25010000002 DEXAMETHASONE PER 1 MG: Performed by: STUDENT IN AN ORGANIZED HEALTH CARE EDUCATION/TRAINING PROGRAM

## 2025-08-28 RX ORDER — SODIUM CHLORIDE 0.9 % (FLUSH) 0.9 %
10 SYRINGE (ML) INJECTION AS NEEDED
Status: DISCONTINUED | OUTPATIENT
Start: 2025-08-28 | End: 2025-08-30 | Stop reason: HOSPADM

## 2025-08-28 RX ORDER — HEPARIN SODIUM (PORCINE) LOCK FLUSH IV SOLN 100 UNIT/ML 100 UNIT/ML
500 SOLUTION INTRAVENOUS AS NEEDED
Status: DISCONTINUED | OUTPATIENT
Start: 2025-08-28 | End: 2025-08-30 | Stop reason: HOSPADM

## 2025-08-28 RX ORDER — DEXAMETHASONE SODIUM PHOSPHATE 4 MG/ML
12 INJECTION, SOLUTION INTRA-ARTICULAR; INTRALESIONAL; INTRAMUSCULAR; INTRAVENOUS; SOFT TISSUE ONCE
Status: COMPLETED | OUTPATIENT
Start: 2025-08-28 | End: 2025-08-28

## 2025-08-28 RX ORDER — SODIUM CHLORIDE 9 MG/ML
20 INJECTION, SOLUTION INTRAVENOUS ONCE
Status: COMPLETED | OUTPATIENT
Start: 2025-08-28 | End: 2025-08-28

## 2025-08-28 RX ORDER — SODIUM CHLORIDE 0.9 % (FLUSH) 0.9 %
10 SYRINGE (ML) INJECTION AS NEEDED
Status: CANCELLED | OUTPATIENT
Start: 2025-08-28

## 2025-08-28 RX ORDER — HEPARIN SODIUM (PORCINE) LOCK FLUSH IV SOLN 100 UNIT/ML 100 UNIT/ML
500 SOLUTION INTRAVENOUS AS NEEDED
Status: CANCELLED | OUTPATIENT
Start: 2025-08-28

## 2025-08-28 RX ADMIN — HEPARIN 500 UNITS: 100 SYRINGE at 12:22

## 2025-08-28 RX ADMIN — SODIUM CHLORIDE 20 ML/HR: 9 INJECTION, SOLUTION INTRAVENOUS at 10:29

## 2025-08-28 RX ADMIN — Medication 10 ML: at 12:22

## 2025-08-28 RX ADMIN — SODIUM CHLORIDE 190 MG: 9 INJECTION, SOLUTION INTRAVENOUS at 11:10

## 2025-08-28 RX ADMIN — DEXAMETHASONE SODIUM PHOSPHATE 12 MG: 4 INJECTION INTRA-ARTICULAR; INTRALESIONAL; INTRAMUSCULAR; INTRAVENOUS; SOFT TISSUE at 10:30

## 2025-08-29 ENCOUNTER — HOSPITAL ENCOUNTER (OUTPATIENT)
Dept: ONCOLOGY | Facility: HOSPITAL | Age: 65
Discharge: HOME | End: 2025-08-29
Payer: MEDICARE

## 2025-08-29 VITALS
DIASTOLIC BLOOD PRESSURE: 68 MMHG | SYSTOLIC BLOOD PRESSURE: 117 MMHG | HEIGHT: 68 IN | OXYGEN SATURATION: 98 % | WEIGHT: 165 LBS | RESPIRATION RATE: 18 BRPM | HEART RATE: 81 BPM | BODY MASS INDEX: 25.01 KG/M2 | TEMPERATURE: 96.6 F

## 2025-08-29 DIAGNOSIS — C34.12 SMALL CELL CARCINOMA OF UPPER LOBE OF LEFT LUNG: Primary | ICD-10-CM

## 2025-08-29 DIAGNOSIS — R11.0 CHEMOTHERAPY-INDUCED NAUSEA: Primary | ICD-10-CM

## 2025-08-29 DIAGNOSIS — C34.11 MALIGNANT NEOPLASM OF UPPER LOBE OF RIGHT LUNG: ICD-10-CM

## 2025-08-29 DIAGNOSIS — C34.12 SMALL CELL CARCINOMA OF UPPER LOBE OF LEFT LUNG: ICD-10-CM

## 2025-08-29 DIAGNOSIS — T45.1X5A CHEMOTHERAPY-INDUCED NAUSEA: Primary | ICD-10-CM

## 2025-08-29 DIAGNOSIS — T45.1X5A CHEMOTHERAPY-INDUCED NAUSEA: ICD-10-CM

## 2025-08-29 DIAGNOSIS — R11.0 CHEMOTHERAPY-INDUCED NAUSEA: ICD-10-CM

## 2025-08-29 LAB
ALP BLD-CCNC: 63 U/L (ref 42–141)
BUN BLDA-MCNC: 18 MG/DL (ref 7–22)
CALCIUM BLD QL: 9.2 MG/DL (ref 8–10.3)
CHLORIDE BLDA-SCNC: 107 MMOL/L (ref 98–108)
CO2 BLDA-SCNC: 24 MMOL/L (ref 18–33)
CREAT BLDA-MCNC: 0.7 MG/DL (ref 0.6–1.2)
EGFRCR SERPLBLD CKD-EPI 2021: 96.1 ML/MIN/1.73
GLUCOSE BLDC GLUCOMTR-MCNC: 344 MG/DL (ref 73–118)
POC ALBUMIN: 3.3 G/L (ref 3.3–5.5)
POC ALT (SGPT): 18 U/L (ref 10–47)
POC AST (SGOT): 15 U/L (ref 11–38)
POC TOTAL BILIRUBIN: 0.6 MG/DL (ref 0.2–1.6)
POC TOTAL PROTEIN: 6.8 G/DL (ref 6.4–8.1)
POTASSIUM BLDA-SCNC: 3.9 MMOL/L (ref 3.6–5.1)
SODIUM BLD-SCNC: 143 MMOL/L (ref 128–145)

## 2025-08-29 PROCEDURE — 25010000002 DEXAMETHASONE PER 1 MG: Performed by: STUDENT IN AN ORGANIZED HEALTH CARE EDUCATION/TRAINING PROGRAM

## 2025-08-29 PROCEDURE — 80053 COMPREHEN METABOLIC PANEL: CPT

## 2025-08-29 PROCEDURE — 25810000003 SODIUM CHLORIDE 0.9 % SOLUTION 500 ML FLEX CONT: Performed by: STUDENT IN AN ORGANIZED HEALTH CARE EDUCATION/TRAINING PROGRAM

## 2025-08-29 PROCEDURE — 96375 TX/PRO/DX INJ NEW DRUG ADDON: CPT

## 2025-08-29 PROCEDURE — 25010000002 ONDANSETRON PER 1 MG: Performed by: STUDENT IN AN ORGANIZED HEALTH CARE EDUCATION/TRAINING PROGRAM

## 2025-08-29 PROCEDURE — 25810000003 SODIUM CHLORIDE 0.9 % SOLUTION: Performed by: STUDENT IN AN ORGANIZED HEALTH CARE EDUCATION/TRAINING PROGRAM

## 2025-08-29 PROCEDURE — 25010000002 ETOPOSIDE 1 GM/50ML SOLUTION 50 ML VIAL: Performed by: STUDENT IN AN ORGANIZED HEALTH CARE EDUCATION/TRAINING PROGRAM

## 2025-08-29 PROCEDURE — 25010000002 HEPARIN LOCK FLUSH PER 10 UNITS: Performed by: INTERNAL MEDICINE

## 2025-08-29 PROCEDURE — 96413 CHEMO IV INFUSION 1 HR: CPT

## 2025-08-29 RX ORDER — SODIUM CHLORIDE 9 MG/ML
20 INJECTION, SOLUTION INTRAVENOUS ONCE
Status: COMPLETED | OUTPATIENT
Start: 2025-08-29 | End: 2025-08-29

## 2025-08-29 RX ORDER — ONDANSETRON 2 MG/ML
8 INJECTION INTRAMUSCULAR; INTRAVENOUS ONCE
Status: COMPLETED | OUTPATIENT
Start: 2025-08-29 | End: 2025-08-29

## 2025-08-29 RX ORDER — DEXAMETHASONE SODIUM PHOSPHATE 4 MG/ML
12 INJECTION, SOLUTION INTRA-ARTICULAR; INTRALESIONAL; INTRAMUSCULAR; INTRAVENOUS; SOFT TISSUE ONCE
Status: COMPLETED | OUTPATIENT
Start: 2025-08-29 | End: 2025-08-29

## 2025-08-29 RX ORDER — HEPARIN SODIUM (PORCINE) LOCK FLUSH IV SOLN 100 UNIT/ML 100 UNIT/ML
500 SOLUTION INTRAVENOUS AS NEEDED
Status: DISCONTINUED | OUTPATIENT
Start: 2025-08-29 | End: 2025-08-30 | Stop reason: HOSPADM

## 2025-08-29 RX ORDER — SODIUM CHLORIDE 0.9 % (FLUSH) 0.9 %
10 SYRINGE (ML) INJECTION AS NEEDED
OUTPATIENT
Start: 2025-08-29

## 2025-08-29 RX ORDER — HEPARIN SODIUM (PORCINE) LOCK FLUSH IV SOLN 100 UNIT/ML 100 UNIT/ML
500 SOLUTION INTRAVENOUS AS NEEDED
OUTPATIENT
Start: 2025-08-29

## 2025-08-29 RX ORDER — ONDANSETRON 2 MG/ML
8 INJECTION INTRAMUSCULAR; INTRAVENOUS ONCE
Status: CANCELLED
Start: 2025-08-29 | End: 2025-08-29

## 2025-08-29 RX ORDER — SODIUM CHLORIDE 0.9 % (FLUSH) 0.9 %
10 SYRINGE (ML) INJECTION AS NEEDED
Status: DISCONTINUED | OUTPATIENT
Start: 2025-08-29 | End: 2025-08-30 | Stop reason: HOSPADM

## 2025-08-29 RX ADMIN — Medication 10 ML: at 11:46

## 2025-08-29 RX ADMIN — ONDANSETRON 8 MG: 2 INJECTION INTRAMUSCULAR; INTRAVENOUS at 10:21

## 2025-08-29 RX ADMIN — SODIUM CHLORIDE 20 ML/HR: 9 INJECTION, SOLUTION INTRAVENOUS at 10:21

## 2025-08-29 RX ADMIN — SODIUM CHLORIDE 190 MG: 9 INJECTION, SOLUTION INTRAVENOUS at 10:41

## 2025-08-29 RX ADMIN — DEXAMETHASONE SODIUM PHOSPHATE 12 MG: 4 INJECTION INTRA-ARTICULAR; INTRALESIONAL; INTRAMUSCULAR; INTRAVENOUS; SOFT TISSUE at 10:28

## 2025-08-29 RX ADMIN — HEPARIN 500 UNITS: 100 SYRINGE at 11:46

## 2025-09-01 PROBLEM — R11.2 NAUSEA & VOMITING: Status: ACTIVE | Noted: 2025-09-01

## (undated) DEVICE — ADAPT CLN BIOGUARD AIR/H2O DISP

## (undated) DEVICE — PRESSURE TUBING: Brand: TRUWAVE

## (undated) DEVICE — SENSR O2 OXIMAX FNGR A/ 18IN NONSTR

## (undated) DEVICE — KT ASP VIZISHOT 5SYRG 5BIOPSY/VLV 22G

## (undated) DEVICE — SINGLE USE SUCTION VALVE MAJ-209: Brand: SINGLE USE SUCTION VALVE (STERILE)

## (undated) DEVICE — VISION PROBE: Brand: ION

## (undated) DEVICE — KIT,ANTI FOG,W/SPONGE & FLUID,SOFT PACK: Brand: MEDLINE

## (undated) DEVICE — FRCP BIOP SUPER TRAX 1.7MM 110CM

## (undated) DEVICE — ADAPT SWVL FIBROPTIC BRONCH

## (undated) DEVICE — VISION PROBE ADAPTER AND SUCTION ADAPTER

## (undated) DEVICE — LARGE BORE STOPCOCK WITH EXTENSION SET, MALE LUER LOCK ADAPTER WITH RETRACTABLE COLLAR

## (undated) DEVICE — PRESSURE MONITORING ACCESSORY: Brand: TRUWAVE

## (undated) DEVICE — CATHETER: Brand: ION

## (undated) DEVICE — Device

## (undated) DEVICE — BAPTIST FLOYD BRONCHOSCOPY: Brand: MEDLINE INDUSTRIES, INC.

## (undated) DEVICE — Device: Brand: BALLOON

## (undated) DEVICE — SWIVEL CONNECTOR

## (undated) DEVICE — TUBING, SUCTION, 1/4" X 10', STRAIGHT: Brand: MEDLINE

## (undated) DEVICE — SINGLE USE BIOPSY VALVE MAJ-210: Brand: SINGLE USE BIOPSY VALVE (STERILE)

## (undated) DEVICE — BIOPSY NEEDLE, 23G: Brand: FLEXISION

## (undated) DEVICE — Device: Brand: ERBE